# Patient Record
Sex: MALE | Race: WHITE | NOT HISPANIC OR LATINO | Employment: OTHER | URBAN - METROPOLITAN AREA
[De-identification: names, ages, dates, MRNs, and addresses within clinical notes are randomized per-mention and may not be internally consistent; named-entity substitution may affect disease eponyms.]

---

## 2019-01-01 RX ORDER — DILTIAZEM HYDROCHLORIDE 120 MG/1
120 CAPSULE, COATED, EXTENDED RELEASE ORAL 2 TIMES DAILY
Qty: 180 CAPSULE | Refills: 3 | Status: SHIPPED | OUTPATIENT
Start: 2019-01-01

## 2019-08-05 ENCOUNTER — OFFICE VISIT (OUTPATIENT)
Dept: CARDIOLOGY | Facility: CLINIC | Age: 84
End: 2019-08-05

## 2019-08-05 VITALS
HEIGHT: 67 IN | WEIGHT: 185 LBS | SYSTOLIC BLOOD PRESSURE: 177 MMHG | DIASTOLIC BLOOD PRESSURE: 69 MMHG | BODY MASS INDEX: 29.03 KG/M2 | HEART RATE: 60 BPM

## 2019-08-05 DIAGNOSIS — R00.2 PALPITATIONS: ICD-10-CM

## 2019-08-05 DIAGNOSIS — R06.02 SHORTNESS OF BREATH: ICD-10-CM

## 2019-08-05 DIAGNOSIS — I25.10 CHRONIC CORONARY ARTERY DISEASE: Primary | ICD-10-CM

## 2019-08-05 DIAGNOSIS — E78.5 HYPERLIPIDEMIA, UNSPECIFIED HYPERLIPIDEMIA TYPE: ICD-10-CM

## 2019-08-05 DIAGNOSIS — I10 ESSENTIAL HYPERTENSION: ICD-10-CM

## 2019-08-05 PROCEDURE — 99214 OFFICE O/P EST MOD 30 MIN: CPT | Performed by: INTERNAL MEDICINE

## 2019-08-05 RX ORDER — RAMIPRIL 10 MG/1
10 CAPSULE ORAL 2 TIMES DAILY
COMMUNITY
Start: 2019-08-01

## 2019-08-05 RX ORDER — ASPIRIN 81 MG/1
81 TABLET ORAL DAILY
COMMUNITY
Start: 2015-09-11

## 2019-08-05 RX ORDER — DILTIAZEM HYDROCHLORIDE 120 MG/1
120 CAPSULE, COATED, EXTENDED RELEASE ORAL 2 TIMES DAILY
Refills: 4 | COMMUNITY
Start: 2019-06-11 | End: 2019-01-01 | Stop reason: SDUPTHER

## 2019-08-05 RX ORDER — CLOPIDOGREL BISULFATE 75 MG/1
75 TABLET ORAL DAILY
Refills: 3 | COMMUNITY
Start: 2019-06-20

## 2019-08-05 RX ORDER — HYDROCODONE BITARTRATE AND ACETAMINOPHEN 10; 325 MG/1; MG/1
1 TABLET ORAL EVERY 6 HOURS PRN
Refills: 0 | COMMUNITY
Start: 2019-07-03

## 2019-08-05 RX ORDER — FLUTICASONE PROPIONATE 50 MCG
1 SPRAY, SUSPENSION (ML) NASAL DAILY
Refills: 1 | COMMUNITY
Start: 2019-07-19 | End: 2020-01-01

## 2019-08-05 RX ORDER — ATORVASTATIN CALCIUM 40 MG/1
40 TABLET, FILM COATED ORAL DAILY
Refills: 3 | COMMUNITY
Start: 2019-06-09

## 2019-08-05 RX ORDER — HYDRALAZINE HYDROCHLORIDE 50 MG/1
TABLET, FILM COATED ORAL EVERY 12 HOURS
COMMUNITY
Start: 2018-10-30 | End: 2019-08-05 | Stop reason: SDUPTHER

## 2019-08-05 RX ORDER — HYDRALAZINE HYDROCHLORIDE 100 MG/1
100 TABLET, FILM COATED ORAL EVERY 12 HOURS
Qty: 120 TABLET | Refills: 4 | Status: SHIPPED | OUTPATIENT
Start: 2019-08-05 | End: 2020-01-01

## 2019-08-05 RX ORDER — OMEPRAZOLE 40 MG/1
40 CAPSULE, DELAYED RELEASE ORAL DAILY
Refills: 3 | COMMUNITY
Start: 2019-07-22

## 2019-08-05 NOTE — PROGRESS NOTES
Date of Office Visit: 2019  Encounter Provider: Jose Lema MD  Place of Service: [facility] CARDIOLOGY [place]  Patient Name: Primitivo Contreras  :1935  Hawa Estevez NP    Chief Complaint   Patient presents with   • Coronary Artery Disease  Hypertension  Lipidemia     6 month f/u   • Hypertension     History of Present Illness    I am pleased to see Mr. Dash my office today as a follow-up.    As you know, patient is to 4 years old white gentleman whose past medical history significant for hypertension, hyperlipidemia, CAD, coronary artery stenting, who came today for follow-up.    In , patient had symptom of angina pectoris and underwent cardiac catheterization and he underwent coronary artery stenting.  Details of this procedure is not available to me.    In 2019, patient underwent stress test which showed no myocardial ischemia but small fixed apical defect was noted consistent with myocardial infarction.  Gated SPECT imaging showed LVEF of 49%.    Since the previous visit, patient is doing well.  Patient denies any symptom of angina pectoris or congestive heart failure.  Patient denies any chest pain or tightness or heaviness.  No orthopnea, PND, syncope or presyncope.  No leg edema noted.  No palpitation.    EKG showed sinus rhythm.  Patient had T wave inversion anteriorly.  This could be due to LVH or previous CAD.    At this stage, patient overall is stable from cardiovascular standpoint.  I would proceed with better control of blood pressure.  I would increase hydralazine 200 mg twice daily.  Patient is advised to monitor the blood pressure at home.  He brought his blood pressure logbook and most of blood pressure was between 140 to 160 mm systolic.  I would consider echocardiogram on next visit          Past Medical History:   Diagnosis Date   • Coronary artery disease    • Hyperlipidemia    • Hypertension          Past Surgical History:   Procedure Laterality Date    • CORONARY STENT PLACEMENT             Current Outpatient Medications:   •  aspirin (ADULT ASPIRIN EC LOW STRENGTH) 81 MG EC tablet, Daily., Disp: , Rfl:   •  atorvastatin (LIPITOR) 40 MG tablet, Take 40 mg by mouth Daily., Disp: , Rfl: 3  •  clopidogrel (PLAVIX) 75 MG tablet, Take 75 mg by mouth Daily., Disp: , Rfl: 3  •  diltiaZEM CD (CARDIZEM CD) 120 MG 24 hr capsule, Take 120 mg by mouth 2 (Two) Times a Day., Disp: , Rfl: 4  •  fluticasone (FLONASE) 50 MCG/ACT nasal spray, 1 spray by Each Nare route Daily., Disp: , Rfl: 1  •  hydrALAZINE (APRESOLINE) 50 MG tablet, Every 12 (Twelve) Hours., Disp: , Rfl:   •  HYDROcodone-acetaminophen (NORCO)  MG per tablet, As Needed., Disp: , Rfl: 0  •  omeprazole (priLOSEC) 40 MG capsule, Take 40 mg by mouth Daily., Disp: , Rfl: 3  •  ramipril (ALTACE) 10 MG capsule, Daily., Disp: , Rfl:       Social History     Socioeconomic History   • Marital status:      Spouse name: Not on file   • Number of children: Not on file   • Years of education: Not on file   • Highest education level: Not on file   Tobacco Use   • Smoking status: Former Smoker   • Tobacco comment: quit 30 yrs ago   Substance and Sexual Activity   • Alcohol use: Yes     Comment: social/ occasional   • Drug use: No   • Sexual activity: Defer         Review of Systems   Constitution: Negative for chills and fever.   HENT: Negative for ear discharge and nosebleeds.    Eyes: Negative for discharge and redness.   Cardiovascular: Negative for chest pain, orthopnea, palpitations, paroxysmal nocturnal dyspnea and syncope.   Respiratory: Negative for cough, shortness of breath and wheezing.    Endocrine: Negative for heat intolerance.   Skin: Negative for rash.   Musculoskeletal: Negative for arthritis and myalgias.   Gastrointestinal: Negative for abdominal pain, melena, nausea and vomiting.   Genitourinary: Negative for dysuria and hematuria.   Neurological: Negative for dizziness, light-headedness,  "numbness and tremors.   Psychiatric/Behavioral: Negative for depression. The patient is not nervous/anxious.        Procedures    Procedures    No orders to display           Objective:    /69   Pulse 60   Ht 170.2 cm (67\")   Wt 83.9 kg (185 lb)   BMI 28.98 kg/m²         Physical Exam   Constitutional: He is oriented to person, place, and time. He appears well-developed and well-nourished.   HENT:   Head: Normocephalic and atraumatic.   Eyes: Right eye exhibits no discharge. No scleral icterus.   Neck: No thyromegaly present.   Cardiovascular: Normal rate, regular rhythm and normal heart sounds. Exam reveals no gallop and no friction rub.   No murmur heard.  Pulmonary/Chest: Effort normal and breath sounds normal. No respiratory distress. He has no wheezes. He has no rales.   Abdominal: There is no tenderness.   Musculoskeletal: He exhibits no edema.   Lymphadenopathy:     He has no cervical adenopathy.   Neurological: He is alert and oriented to person, place, and time.   Skin: No rash noted. No erythema.   Psychiatric: He has a normal mood and affect.           Assessment:       Diagnosis Plan   1. Chronic coronary artery disease     2. Hyperlipidemia, unspecified hyperlipidemia type     3. Essential hypertension     4. Palpitations     5. Shortness of breath              Plan:       I am overall pleased with the patient progress.  His blood pressure is still elevated.  I would recommend to proceed with change in hydralazine.  I would change hydralazine 200 mg twice daily.  Blood pressure monitoring is recommended.  "

## 2019-10-03 ENCOUNTER — TELEPHONE (OUTPATIENT)
Dept: CARDIOLOGY | Facility: CLINIC | Age: 84
End: 2019-10-03

## 2019-10-03 NOTE — TELEPHONE ENCOUNTER
His ankles have been swollen for the last few weeks  They keep geting worse  Wanted to know what you wanted him to do

## 2019-10-04 RX ORDER — HYDROCHLOROTHIAZIDE 12.5 MG/1
12.5 CAPSULE, GELATIN COATED ORAL DAILY
Qty: 30 CAPSULE | Refills: 11 | Status: SHIPPED | OUTPATIENT
Start: 2019-10-04 | End: 2020-01-01

## 2019-10-04 NOTE — TELEPHONE ENCOUNTER
Per Dr Lema, he is to start taking hydrochlorithiazide 12.5mg.  I informed the patient that this RX to his pharmacy.

## 2020-01-01 ENCOUNTER — APPOINTMENT (OUTPATIENT)
Dept: GENERAL RADIOLOGY | Facility: HOSPITAL | Age: 85
End: 2020-01-01

## 2020-01-01 ENCOUNTER — OFFICE VISIT (OUTPATIENT)
Dept: CARDIOLOGY | Facility: CLINIC | Age: 85
End: 2020-01-01

## 2020-01-01 ENCOUNTER — APPOINTMENT (OUTPATIENT)
Dept: CARDIOLOGY | Facility: HOSPITAL | Age: 85
End: 2020-01-01

## 2020-01-01 ENCOUNTER — APPOINTMENT (OUTPATIENT)
Dept: NEUROLOGY | Facility: HOSPITAL | Age: 85
End: 2020-01-01

## 2020-01-01 ENCOUNTER — HOSPITAL ENCOUNTER (INPATIENT)
Facility: HOSPITAL | Age: 85
LOS: 4 days | End: 2020-10-11
Attending: EMERGENCY MEDICINE | Admitting: INTERNAL MEDICINE

## 2020-01-01 ENCOUNTER — APPOINTMENT (OUTPATIENT)
Dept: MRI IMAGING | Facility: HOSPITAL | Age: 85
End: 2020-01-01

## 2020-01-01 ENCOUNTER — APPOINTMENT (OUTPATIENT)
Dept: CT IMAGING | Facility: HOSPITAL | Age: 85
End: 2020-01-01

## 2020-01-01 VITALS
HEIGHT: 67 IN | SYSTOLIC BLOOD PRESSURE: 145 MMHG | BODY MASS INDEX: 28.25 KG/M2 | WEIGHT: 180 LBS | HEART RATE: 63 BPM | DIASTOLIC BLOOD PRESSURE: 60 MMHG | OXYGEN SATURATION: 96 %

## 2020-01-01 VITALS
HEIGHT: 67 IN | BODY MASS INDEX: 29.19 KG/M2 | DIASTOLIC BLOOD PRESSURE: 63 MMHG | SYSTOLIC BLOOD PRESSURE: 160 MMHG | WEIGHT: 186 LBS | HEART RATE: 63 BPM

## 2020-01-01 VITALS
SYSTOLIC BLOOD PRESSURE: 112 MMHG | TEMPERATURE: 99 F | OXYGEN SATURATION: 82 % | RESPIRATION RATE: 34 BRPM | DIASTOLIC BLOOD PRESSURE: 62 MMHG | WEIGHT: 187.39 LBS | HEART RATE: 102 BPM | BODY MASS INDEX: 25.38 KG/M2 | HEIGHT: 72 IN

## 2020-01-01 DIAGNOSIS — I10 ESSENTIAL HYPERTENSION: ICD-10-CM

## 2020-01-01 DIAGNOSIS — R00.2 PALPITATIONS: ICD-10-CM

## 2020-01-01 DIAGNOSIS — R06.02 SHORTNESS OF BREATH: ICD-10-CM

## 2020-01-01 DIAGNOSIS — I21.9 MYOCARDIAL INFARCTION, UNSPECIFIED MI TYPE, UNSPECIFIED ARTERY (HCC): ICD-10-CM

## 2020-01-01 DIAGNOSIS — E78.2 MIXED HYPERLIPIDEMIA: Primary | ICD-10-CM

## 2020-01-01 DIAGNOSIS — I25.10 CORONARY ARTERY DISEASE INVOLVING NATIVE CORONARY ARTERY OF NATIVE HEART WITHOUT ANGINA PECTORIS: ICD-10-CM

## 2020-01-01 DIAGNOSIS — E78.5 HYPERLIPIDEMIA, UNSPECIFIED HYPERLIPIDEMIA TYPE: ICD-10-CM

## 2020-01-01 DIAGNOSIS — I46.9 CARDIAC ARREST (HCC): Primary | ICD-10-CM

## 2020-01-01 DIAGNOSIS — I25.10 CHRONIC CORONARY ARTERY DISEASE: ICD-10-CM

## 2020-01-01 DIAGNOSIS — I49.01 VENTRICULAR FIBRILLATION (HCC): ICD-10-CM

## 2020-01-01 LAB
ALBUMIN SERPL-MCNC: 2.5 G/DL (ref 3.5–5.2)
ALBUMIN SERPL-MCNC: 3 G/DL (ref 3.5–5.2)
ALBUMIN SERPL-MCNC: 3 G/DL (ref 3.5–5.2)
ALBUMIN SERPL-MCNC: 3.3 G/DL (ref 3.5–5.2)
ALBUMIN SERPL-MCNC: 3.3 G/DL (ref 3.5–5.2)
ALBUMIN SERPL-MCNC: 3.4 G/DL (ref 3.5–5.2)
ALBUMIN SERPL-MCNC: 3.6 G/DL (ref 3.5–5.2)
ALBUMIN SERPL-MCNC: 3.8 G/DL (ref 3.5–5.2)
ALBUMIN/GLOB SERPL: 1 G/DL
ALBUMIN/GLOB SERPL: 1.3 G/DL
ALBUMIN/GLOB SERPL: 1.4 G/DL
ALBUMIN/GLOB SERPL: 1.5 G/DL
ALP SERPL-CCNC: 53 U/L (ref 39–117)
ALP SERPL-CCNC: 54 U/L (ref 39–117)
ALP SERPL-CCNC: 58 U/L (ref 39–117)
ALP SERPL-CCNC: 58 U/L (ref 39–117)
ALP SERPL-CCNC: 63 U/L (ref 39–117)
ALP SERPL-CCNC: 67 U/L (ref 39–117)
ALP SERPL-CCNC: 80 U/L (ref 39–117)
ALP SERPL-CCNC: 91 U/L (ref 39–117)
ALT SERPL W P-5'-P-CCNC: 41 U/L (ref 1–41)
ALT SERPL W P-5'-P-CCNC: 53 U/L (ref 1–41)
ALT SERPL W P-5'-P-CCNC: 59 U/L (ref 1–41)
ALT SERPL W P-5'-P-CCNC: 59 U/L (ref 1–41)
ALT SERPL W P-5'-P-CCNC: 62 U/L (ref 1–41)
ALT SERPL W P-5'-P-CCNC: 63 U/L (ref 1–41)
ALT SERPL W P-5'-P-CCNC: 64 U/L (ref 1–41)
ALT SERPL W P-5'-P-CCNC: 66 U/L (ref 1–41)
ANION GAP SERPL CALCULATED.3IONS-SCNC: 13 MMOL/L (ref 5–15)
ANION GAP SERPL CALCULATED.3IONS-SCNC: 15 MMOL/L (ref 5–15)
ANION GAP SERPL CALCULATED.3IONS-SCNC: 16 MMOL/L (ref 5–15)
ANION GAP SERPL CALCULATED.3IONS-SCNC: 19 MMOL/L (ref 5–15)
ANION GAP SERPL CALCULATED.3IONS-SCNC: 21 MMOL/L (ref 5–15)
ANION GAP SERPL CALCULATED.3IONS-SCNC: 23 MMOL/L (ref 5–15)
ANISOCYTOSIS BLD QL: ABNORMAL
APTT PPP: 21.9 SECONDS (ref 24–31)
APTT PPP: 24.3 SECONDS (ref 24–31)
APTT PPP: 26.6 SECONDS (ref 24–31)
APTT PPP: 27.2 SECONDS (ref 24–31)
APTT PPP: 28.1 SECONDS (ref 24–31)
APTT PPP: 28.2 SECONDS (ref 24–31)
APTT PPP: 28.4 SECONDS (ref 24–31)
APTT PPP: 37.1 SECONDS (ref 24–31)
ARTERIAL PATENCY WRIST A: ABNORMAL
ARTERIAL PATENCY WRIST A: POSITIVE
ARTERIAL PATENCY WRIST A: POSITIVE
AST SERPL-CCNC: 109 U/L (ref 1–40)
AST SERPL-CCNC: 110 U/L (ref 1–40)
AST SERPL-CCNC: 119 U/L (ref 1–40)
AST SERPL-CCNC: 124 U/L (ref 1–40)
AST SERPL-CCNC: 124 U/L (ref 1–40)
AST SERPL-CCNC: 125 U/L (ref 1–40)
AST SERPL-CCNC: 126 U/L (ref 1–40)
AST SERPL-CCNC: 138 U/L (ref 1–40)
ATMOSPHERIC PRESS: ABNORMAL MM[HG]
B PARAPERT DNA SPEC QL NAA+PROBE: NOT DETECTED
B PERT DNA SPEC QL NAA+PROBE: NOT DETECTED
BASE EXCESS BLDA CALC-SCNC: -1.2 MMOL/L (ref 0–3)
BASE EXCESS BLDA CALC-SCNC: -3.5 MMOL/L (ref 0–3)
BASE EXCESS BLDA CALC-SCNC: -4.8 MMOL/L (ref 0–3)
BASE EXCESS BLDA CALC-SCNC: -5 MMOL/L (ref 0–3)
BASE EXCESS BLDA CALC-SCNC: -6 MMOL/L (ref 0–3)
BASE EXCESS BLDA CALC-SCNC: -9.1 MMOL/L (ref 0–3)
BASOPHILS # BLD AUTO: 0 10*3/MM3 (ref 0–0.2)
BASOPHILS # BLD AUTO: 0.1 10*3/MM3 (ref 0–0.2)
BASOPHILS # BLD AUTO: 0.1 10*3/MM3 (ref 0–0.2)
BASOPHILS NFR BLD AUTO: 0.2 % (ref 0–1.5)
BASOPHILS NFR BLD AUTO: 0.8 % (ref 0–1.5)
BASOPHILS NFR BLD AUTO: 1 % (ref 0–1.5)
BDY SITE: ABNORMAL
BH CV ECHO MEAS - AO ROOT AREA (BSA CORRECTED): 1.5
BH CV ECHO MEAS - AO ROOT AREA: 7 CM^2
BH CV ECHO MEAS - AO ROOT DIAM: 3 CM
BH CV ECHO MEAS - ASC AORTA: 2.9 CM
BH CV ECHO MEAS - BSA(HAYCOCK): 2 M^2
BH CV ECHO MEAS - BSA: 2 M^2
BH CV ECHO MEAS - BZI_BMI: 24.3 KILOGRAMS/M^2
BH CV ECHO MEAS - BZI_METRIC_HEIGHT: 182.9 CM
BH CV ECHO MEAS - BZI_METRIC_WEIGHT: 81.2 KG
BH CV ECHO MEAS - EDV(CUBED): 134.6 ML
BH CV ECHO MEAS - EDV(TEICH): 125.2 ML
BH CV ECHO MEAS - EF(CUBED): 26.3 %
BH CV ECHO MEAS - EF(TEICH): 21.1 %
BH CV ECHO MEAS - ESV(CUBED): 99.1 ML
BH CV ECHO MEAS - ESV(TEICH): 98.7 ML
BH CV ECHO MEAS - FS: 9.7 %
BH CV ECHO MEAS - IVS/LVPW: 0.96
BH CV ECHO MEAS - IVSD: 1.1 CM
BH CV ECHO MEAS - LV MASS(C)D: 228.6 GRAMS
BH CV ECHO MEAS - LV MASS(C)DI: 112.5 GRAMS/M^2
BH CV ECHO MEAS - LVIDD: 5.1 CM
BH CV ECHO MEAS - LVIDS: 4.6 CM
BH CV ECHO MEAS - LVOT AREA: 3.7 CM^2
BH CV ECHO MEAS - LVOT DIAM: 2.2 CM
BH CV ECHO MEAS - LVPWD: 1.2 CM
BH CV ECHO MEAS - MV A MAX VEL: 105.6 CM/SEC
BH CV ECHO MEAS - MV DEC SLOPE: 235.5 CM/SEC^2
BH CV ECHO MEAS - MV DEC TIME: 0.28 SEC
BH CV ECHO MEAS - MV E MAX VEL: 66.9 CM/SEC
BH CV ECHO MEAS - MV E/A: 0.63
BH CV ECHO MEAS - MV MAX PG: 4.2 MMHG
BH CV ECHO MEAS - MV MEAN PG: 1.6 MMHG
BH CV ECHO MEAS - MV V2 MAX: 102.3 CM/SEC
BH CV ECHO MEAS - MV V2 MEAN: 57.3 CM/SEC
BH CV ECHO MEAS - MV V2 VTI: 30.8 CM
BH CV ECHO MEAS - PA MAX PG: 6.1 MMHG
BH CV ECHO MEAS - PA MEAN PG: 4.5 MMHG
BH CV ECHO MEAS - PA V2 MAX: 123.3 CM/SEC
BH CV ECHO MEAS - PA V2 MEAN: 104 CM/SEC
BH CV ECHO MEAS - PA V2 VTI: 26.1 CM
BH CV ECHO MEAS - RVDD: 2.7 CM
BH CV ECHO MEAS - SI(CUBED): 17.4 ML/M^2
BH CV ECHO MEAS - SI(TEICH): 13 ML/M^2
BH CV ECHO MEAS - SV(CUBED): 35.4 ML
BH CV ECHO MEAS - SV(TEICH): 26.4 ML
BILIRUB SERPL-MCNC: 0.4 MG/DL (ref 0–1.2)
BILIRUB SERPL-MCNC: 0.5 MG/DL (ref 0–1.2)
BILIRUB SERPL-MCNC: 0.5 MG/DL (ref 0–1.2)
BILIRUB SERPL-MCNC: 0.7 MG/DL (ref 0–1.2)
BILIRUB SERPL-MCNC: 0.8 MG/DL (ref 0–1.2)
BUN SERPL-MCNC: 18 MG/DL (ref 8–23)
BUN SERPL-MCNC: 21 MG/DL (ref 8–23)
BUN SERPL-MCNC: 23 MG/DL (ref 8–23)
BUN SERPL-MCNC: 25 MG/DL (ref 8–23)
BUN SERPL-MCNC: 27 MG/DL (ref 8–23)
BUN SERPL-MCNC: 29 MG/DL (ref 8–23)
BUN SERPL-MCNC: 31 MG/DL (ref 8–23)
BUN SERPL-MCNC: 32 MG/DL (ref 8–23)
BUN SERPL-MCNC: 47 MG/DL (ref 8–23)
BUN SERPL-MCNC: ABNORMAL MG/DL
BUN/CREAT SERPL: ABNORMAL
BURR CELLS BLD QL SMEAR: ABNORMAL
C PNEUM DNA NPH QL NAA+NON-PROBE: NOT DETECTED
CA-I BLDA-SCNC: 0.88 MMOL/L (ref 1.15–1.33)
CA-I SERPL ISE-MCNC: 1.04 MMOL/L (ref 1.2–1.3)
CA-I SERPL ISE-MCNC: 1.09 MMOL/L (ref 1.2–1.3)
CA-I SERPL ISE-MCNC: 1.11 MMOL/L (ref 1.2–1.3)
CA-I SERPL ISE-MCNC: 1.14 MMOL/L (ref 1.2–1.3)
CA-I SERPL ISE-MCNC: 1.14 MMOL/L (ref 1.2–1.3)
CA-I SERPL ISE-MCNC: 1.17 MMOL/L (ref 1.2–1.3)
CA-I SERPL ISE-MCNC: 1.17 MMOL/L (ref 1.2–1.3)
CALCIUM SPEC-SCNC: 6.9 MG/DL (ref 8.6–10.5)
CALCIUM SPEC-SCNC: 7.6 MG/DL (ref 8.6–10.5)
CALCIUM SPEC-SCNC: 7.8 MG/DL (ref 8.6–10.5)
CALCIUM SPEC-SCNC: 7.9 MG/DL (ref 8.6–10.5)
CALCIUM SPEC-SCNC: 8 MG/DL (ref 8.6–10.5)
CALCIUM SPEC-SCNC: 8.1 MG/DL (ref 8.6–10.5)
CALCIUM SPEC-SCNC: 8.4 MG/DL (ref 8.6–10.5)
CHLORIDE SERPL-SCNC: 101 MMOL/L (ref 98–107)
CHLORIDE SERPL-SCNC: 106 MMOL/L (ref 98–107)
CHLORIDE SERPL-SCNC: 107 MMOL/L (ref 98–107)
CHLORIDE SERPL-SCNC: 108 MMOL/L (ref 98–107)
CHLORIDE SERPL-SCNC: 113 MMOL/L (ref 98–107)
CK MB SERPL-CCNC: 11.18 NG/ML
CK SERPL-CCNC: 1220 U/L (ref 20–200)
CK SERPL-CCNC: 1266 U/L (ref 20–200)
CK SERPL-CCNC: 1515 U/L (ref 20–200)
CK SERPL-CCNC: 164 U/L (ref 20–200)
CK SERPL-CCNC: 362 U/L (ref 20–200)
CK SERPL-CCNC: 443 U/L (ref 20–200)
CK SERPL-CCNC: 649 U/L (ref 20–200)
CK SERPL-CCNC: 926 U/L (ref 20–200)
CO2 BLDA-SCNC: 16.3 MMOL/L (ref 22–29)
CO2 BLDA-SCNC: 18.5 MMOL/L (ref 22–29)
CO2 BLDA-SCNC: 19.6 MMOL/L (ref 22–29)
CO2 BLDA-SCNC: 23.1 MMOL/L (ref 22–29)
CO2 BLDA-SCNC: 23.5 MMOL/L (ref 22–29)
CO2 BLDA-SCNC: 24.3 MMOL/L (ref 22–29)
CO2 SERPL-SCNC: 13 MMOL/L (ref 22–29)
CO2 SERPL-SCNC: 14 MMOL/L (ref 22–29)
CO2 SERPL-SCNC: 15 MMOL/L (ref 22–29)
CO2 SERPL-SCNC: 16 MMOL/L (ref 22–29)
CO2 SERPL-SCNC: 17 MMOL/L (ref 22–29)
CO2 SERPL-SCNC: 20 MMOL/L (ref 22–29)
CO2 SERPL-SCNC: 20 MMOL/L (ref 22–29)
CO2 SERPL-SCNC: 21 MMOL/L (ref 22–29)
CO2 SERPL-SCNC: 23 MMOL/L (ref 22–29)
CREAT SERPL-MCNC: 1.5 MG/DL (ref 0.76–1.27)
CREAT SERPL-MCNC: 1.77 MG/DL (ref 0.76–1.27)
CREAT SERPL-MCNC: 1.98 MG/DL (ref 0.76–1.27)
CREAT SERPL-MCNC: 2.15 MG/DL (ref 0.76–1.27)
CREAT SERPL-MCNC: 2.31 MG/DL (ref 0.76–1.27)
CREAT SERPL-MCNC: 2.4 MG/DL (ref 0.76–1.27)
CREAT SERPL-MCNC: 2.53 MG/DL (ref 0.76–1.27)
CREAT SERPL-MCNC: 2.67 MG/DL (ref 0.76–1.27)
CREAT SERPL-MCNC: 3.46 MG/DL (ref 0.76–1.27)
D-LACTATE SERPL-SCNC: 1.6 MMOL/L (ref 0.5–2)
D-LACTATE SERPL-SCNC: 1.8 MMOL/L (ref 0.5–2)
D-LACTATE SERPL-SCNC: 2.1 MMOL/L (ref 0.5–2)
D-LACTATE SERPL-SCNC: 3.4 MMOL/L (ref 0.5–2)
D-LACTATE SERPL-SCNC: 5.5 MMOL/L (ref 0.5–2)
D-LACTATE SERPL-SCNC: 5.7 MMOL/L (ref 0.5–2)
D-LACTATE SERPL-SCNC: 7.8 MMOL/L (ref 0.5–2)
D-LACTATE SERPL-SCNC: 9.6 MMOL/L (ref 0.5–2)
DEPRECATED RDW RBC AUTO: 49.4 FL (ref 37–54)
DEPRECATED RDW RBC AUTO: 49.4 FL (ref 37–54)
DEPRECATED RDW RBC AUTO: 49.9 FL (ref 37–54)
DEPRECATED RDW RBC AUTO: 49.9 FL (ref 37–54)
DEPRECATED RDW RBC AUTO: 50.3 FL (ref 37–54)
DEPRECATED RDW RBC AUTO: 50.8 FL (ref 37–54)
EOSINOPHIL # BLD AUTO: 0 10*3/MM3 (ref 0–0.4)
EOSINOPHIL NFR BLD AUTO: 0 % (ref 0.3–6.2)
ERYTHROCYTE [DISTWIDTH] IN BLOOD BY AUTOMATED COUNT: 13.6 % (ref 12.3–15.4)
ERYTHROCYTE [DISTWIDTH] IN BLOOD BY AUTOMATED COUNT: 13.6 % (ref 12.3–15.4)
ERYTHROCYTE [DISTWIDTH] IN BLOOD BY AUTOMATED COUNT: 13.7 % (ref 12.3–15.4)
ERYTHROCYTE [DISTWIDTH] IN BLOOD BY AUTOMATED COUNT: 13.8 % (ref 12.3–15.4)
ERYTHROCYTE [DISTWIDTH] IN BLOOD BY AUTOMATED COUNT: 13.9 % (ref 12.3–15.4)
ERYTHROCYTE [DISTWIDTH] IN BLOOD BY AUTOMATED COUNT: 14.1 % (ref 12.3–15.4)
FLUAV H1 2009 PAND RNA NPH QL NAA+PROBE: NOT DETECTED
FLUAV H1 HA GENE NPH QL NAA+PROBE: NOT DETECTED
FLUAV H3 RNA NPH QL NAA+PROBE: NOT DETECTED
FLUAV SUBTYP SPEC NAA+PROBE: NOT DETECTED
FLUBV RNA ISLT QL NAA+PROBE: NOT DETECTED
GFR SERPL CREATININE-BSD FRML MDRD: 17 ML/MIN/1.73
GFR SERPL CREATININE-BSD FRML MDRD: 23 ML/MIN/1.73
GFR SERPL CREATININE-BSD FRML MDRD: 24 ML/MIN/1.73
GFR SERPL CREATININE-BSD FRML MDRD: 26 ML/MIN/1.73
GFR SERPL CREATININE-BSD FRML MDRD: 27 ML/MIN/1.73
GFR SERPL CREATININE-BSD FRML MDRD: 29 ML/MIN/1.73
GFR SERPL CREATININE-BSD FRML MDRD: 32 ML/MIN/1.73
GFR SERPL CREATININE-BSD FRML MDRD: 37 ML/MIN/1.73
GFR SERPL CREATININE-BSD FRML MDRD: 45 ML/MIN/1.73
GLOBULIN UR ELPH-MCNC: 2 GM/DL
GLOBULIN UR ELPH-MCNC: 2.3 GM/DL
GLOBULIN UR ELPH-MCNC: 2.4 GM/DL
GLOBULIN UR ELPH-MCNC: 2.4 GM/DL
GLOBULIN UR ELPH-MCNC: 2.5 GM/DL
GLOBULIN UR ELPH-MCNC: 2.8 GM/DL
GLUCOSE BLDC GLUCOMTR-MCNC: 100 MG/DL (ref 70–105)
GLUCOSE BLDC GLUCOMTR-MCNC: 103 MG/DL (ref 70–105)
GLUCOSE BLDC GLUCOMTR-MCNC: 104 MG/DL (ref 70–105)
GLUCOSE BLDC GLUCOMTR-MCNC: 109 MG/DL (ref 70–105)
GLUCOSE BLDC GLUCOMTR-MCNC: 129 MG/DL (ref 70–105)
GLUCOSE BLDC GLUCOMTR-MCNC: 131 MG/DL (ref 70–105)
GLUCOSE BLDC GLUCOMTR-MCNC: 151 MG/DL (ref 70–105)
GLUCOSE BLDC GLUCOMTR-MCNC: 159 MG/DL (ref 74–100)
GLUCOSE BLDC GLUCOMTR-MCNC: 159 MG/DL (ref 74–100)
GLUCOSE BLDC GLUCOMTR-MCNC: 174 MG/DL (ref 70–105)
GLUCOSE BLDC GLUCOMTR-MCNC: 189 MG/DL (ref 70–105)
GLUCOSE BLDC GLUCOMTR-MCNC: 226 MG/DL (ref 70–105)
GLUCOSE BLDC GLUCOMTR-MCNC: 228 MG/DL (ref 70–105)
GLUCOSE BLDC GLUCOMTR-MCNC: 236 MG/DL (ref 70–105)
GLUCOSE BLDC GLUCOMTR-MCNC: 74 MG/DL (ref 70–105)
GLUCOSE BLDC GLUCOMTR-MCNC: 75 MG/DL (ref 70–105)
GLUCOSE BLDC GLUCOMTR-MCNC: 80 MG/DL (ref 70–105)
GLUCOSE BLDC GLUCOMTR-MCNC: 80 MG/DL (ref 70–105)
GLUCOSE BLDC GLUCOMTR-MCNC: 85 MG/DL (ref 70–105)
GLUCOSE BLDC GLUCOMTR-MCNC: 86 MG/DL (ref 70–105)
GLUCOSE BLDC GLUCOMTR-MCNC: 86 MG/DL (ref 70–105)
GLUCOSE BLDC GLUCOMTR-MCNC: 87 MG/DL (ref 70–105)
GLUCOSE BLDC GLUCOMTR-MCNC: 88 MG/DL (ref 70–105)
GLUCOSE BLDC GLUCOMTR-MCNC: 90 MG/DL (ref 70–105)
GLUCOSE BLDC GLUCOMTR-MCNC: 91 MG/DL (ref 70–105)
GLUCOSE BLDC GLUCOMTR-MCNC: 96 MG/DL (ref 70–105)
GLUCOSE BLDC GLUCOMTR-MCNC: 97 MG/DL (ref 70–105)
GLUCOSE BLDC GLUCOMTR-MCNC: 97 MG/DL (ref 70–105)
GLUCOSE SERPL-MCNC: 103 MG/DL (ref 65–99)
GLUCOSE SERPL-MCNC: 106 MG/DL (ref 65–99)
GLUCOSE SERPL-MCNC: 120 MG/DL (ref 65–99)
GLUCOSE SERPL-MCNC: 169 MG/DL (ref 65–99)
GLUCOSE SERPL-MCNC: 173 MG/DL (ref 65–99)
GLUCOSE SERPL-MCNC: 178 MG/DL (ref 65–99)
GLUCOSE SERPL-MCNC: 187 MG/DL (ref 65–99)
GLUCOSE SERPL-MCNC: 280 MG/DL (ref 65–99)
GLUCOSE SERPL-MCNC: 91 MG/DL (ref 65–99)
HADV DNA SPEC NAA+PROBE: NOT DETECTED
HCO3 BLDA-SCNC: 15.4 MMOL/L (ref 21–28)
HCO3 BLDA-SCNC: 17.7 MMOL/L (ref 21–28)
HCO3 BLDA-SCNC: 18.6 MMOL/L (ref 21–28)
HCO3 BLDA-SCNC: 21.7 MMOL/L (ref 21–28)
HCO3 BLDA-SCNC: 22.3 MMOL/L (ref 21–28)
HCO3 BLDA-SCNC: 23.2 MMOL/L (ref 21–28)
HCOV 229E RNA SPEC QL NAA+PROBE: NOT DETECTED
HCOV HKU1 RNA SPEC QL NAA+PROBE: NOT DETECTED
HCOV NL63 RNA SPEC QL NAA+PROBE: NOT DETECTED
HCOV OC43 RNA SPEC QL NAA+PROBE: NOT DETECTED
HCT VFR BLD AUTO: 28.1 % (ref 37.5–51)
HCT VFR BLD AUTO: 30.4 % (ref 37.5–51)
HCT VFR BLD AUTO: 31.9 % (ref 37.5–51)
HCT VFR BLD AUTO: 32.6 % (ref 37.5–51)
HCT VFR BLD AUTO: 32.6 % (ref 37.5–51)
HCT VFR BLD AUTO: 32.7 % (ref 37.5–51)
HCT VFR BLD AUTO: 33.4 % (ref 37.5–51)
HCT VFR BLD AUTO: 33.7 % (ref 37.5–51)
HCT VFR BLDA CALC: 32 % (ref 38–51)
HEMODILUTION: NO
HGB BLD-MCNC: 10.2 G/DL (ref 13–17.7)
HGB BLD-MCNC: 10.9 G/DL (ref 13–17.7)
HGB BLD-MCNC: 11.2 G/DL (ref 13–17.7)
HGB BLD-MCNC: 9.6 G/DL (ref 13–17.7)
HGB BLDA-MCNC: 10.8 G/DL (ref 12–17)
HMPV RNA NPH QL NAA+NON-PROBE: NOT DETECTED
HOLD SPECIMEN: NORMAL
HPIV1 RNA SPEC QL NAA+PROBE: NOT DETECTED
HPIV2 RNA SPEC QL NAA+PROBE: NOT DETECTED
HPIV3 RNA NPH QL NAA+PROBE: NOT DETECTED
HPIV4 P GENE NPH QL NAA+PROBE: NOT DETECTED
INHALED O2 CONCENTRATION: 100 %
INHALED O2 CONCENTRATION: 40 %
INHALED O2 CONCENTRATION: 40 %
INHALED O2 CONCENTRATION: 50 %
INHALED O2 CONCENTRATION: 60 %
INHALED O2 CONCENTRATION: 60 %
INR PPP: 1.13 (ref 0.93–1.1)
INR PPP: 1.14 (ref 0.93–1.1)
LACTATE HOLD SPECIMEN: NORMAL
LARGE PLATELETS: ABNORMAL
LYMPHOCYTES # BLD AUTO: 0.5 10*3/MM3 (ref 0.7–3.1)
LYMPHOCYTES # BLD AUTO: 0.7 10*3/MM3 (ref 0.7–3.1)
LYMPHOCYTES # BLD AUTO: 1.1 10*3/MM3 (ref 0.7–3.1)
LYMPHOCYTES # BLD MANUAL: 0.66 10*3/MM3 (ref 0.7–3.1)
LYMPHOCYTES # BLD MANUAL: 0.9 10*3/MM3 (ref 0.7–3.1)
LYMPHOCYTES # BLD MANUAL: 0.9 10*3/MM3 (ref 0.7–3.1)
LYMPHOCYTES # BLD MANUAL: 0.92 10*3/MM3 (ref 0.7–3.1)
LYMPHOCYTES # BLD MANUAL: 2.03 10*3/MM3 (ref 0.7–3.1)
LYMPHOCYTES NFR BLD AUTO: 5.6 % (ref 19.6–45.3)
LYMPHOCYTES NFR BLD AUTO: 7 % (ref 19.6–45.3)
LYMPHOCYTES NFR BLD AUTO: 7.3 % (ref 19.6–45.3)
LYMPHOCYTES NFR BLD MANUAL: 1 % (ref 5–12)
LYMPHOCYTES NFR BLD MANUAL: 10 % (ref 19.6–45.3)
LYMPHOCYTES NFR BLD MANUAL: 3 % (ref 5–12)
LYMPHOCYTES NFR BLD MANUAL: 4 % (ref 19.6–45.3)
LYMPHOCYTES NFR BLD MANUAL: 4 % (ref 5–12)
LYMPHOCYTES NFR BLD MANUAL: 6 % (ref 19.6–45.3)
LYMPHOCYTES NFR BLD MANUAL: 6 % (ref 5–12)
LYMPHOCYTES NFR BLD MANUAL: 7 % (ref 19.6–45.3)
LYMPHOCYTES NFR BLD MANUAL: 9 % (ref 19.6–45.3)
LYMPHOCYTES NFR BLD MANUAL: 9 % (ref 5–12)
M PNEUMO IGG SER IA-ACNC: NOT DETECTED
MACROCYTES BLD QL SMEAR: ABNORMAL
MAGNESIUM SERPL-MCNC: 1.7 MG/DL (ref 1.6–2.4)
MAGNESIUM SERPL-MCNC: 2 MG/DL (ref 1.6–2.4)
MAGNESIUM SERPL-MCNC: 2.3 MG/DL (ref 1.6–2.4)
MAGNESIUM SERPL-MCNC: 2.6 MG/DL (ref 1.6–2.4)
MAGNESIUM SERPL-MCNC: 2.9 MG/DL (ref 1.6–2.4)
MAGNESIUM SERPL-MCNC: 2.9 MG/DL (ref 1.6–2.4)
MAGNESIUM SERPL-MCNC: 3.1 MG/DL (ref 1.6–2.4)
MCH RBC QN AUTO: 34.8 PG (ref 26.6–33)
MCH RBC QN AUTO: 34.9 PG (ref 26.6–33)
MCH RBC QN AUTO: 35.2 PG (ref 26.6–33)
MCH RBC QN AUTO: 35.2 PG (ref 26.6–33)
MCH RBC QN AUTO: 35.3 PG (ref 26.6–33)
MCH RBC QN AUTO: 35.7 PG (ref 26.6–33)
MCHC RBC AUTO-ENTMCNC: 33.2 G/DL (ref 31.5–35.7)
MCHC RBC AUTO-ENTMCNC: 33.4 G/DL (ref 31.5–35.7)
MCHC RBC AUTO-ENTMCNC: 33.4 G/DL (ref 31.5–35.7)
MCHC RBC AUTO-ENTMCNC: 33.6 G/DL (ref 31.5–35.7)
MCHC RBC AUTO-ENTMCNC: 33.6 G/DL (ref 31.5–35.7)
MCHC RBC AUTO-ENTMCNC: 34 G/DL (ref 31.5–35.7)
MCHC RBC AUTO-ENTMCNC: 34.3 G/DL (ref 31.5–35.7)
MCHC RBC AUTO-ENTMCNC: 34.3 G/DL (ref 31.5–35.7)
MCV RBC AUTO: 102.5 FL (ref 79–97)
MCV RBC AUTO: 103.5 FL (ref 79–97)
MCV RBC AUTO: 103.6 FL (ref 79–97)
MCV RBC AUTO: 103.6 FL (ref 79–97)
MCV RBC AUTO: 104 FL (ref 79–97)
MCV RBC AUTO: 104.5 FL (ref 79–97)
MCV RBC AUTO: 104.8 FL (ref 79–97)
MCV RBC AUTO: 105.7 FL (ref 79–97)
METAMYELOCYTES NFR BLD MANUAL: 1 % (ref 0–0)
METAMYELOCYTES NFR BLD MANUAL: 4 % (ref 0–0)
MODALITY: ABNORMAL
MONOCYTES # BLD AUTO: 0.2 10*3/MM3 (ref 0.1–0.9)
MONOCYTES # BLD AUTO: 0.29 10*3/MM3 (ref 0.1–0.9)
MONOCYTES # BLD AUTO: 0.4 10*3/MM3 (ref 0.1–0.9)
MONOCYTES # BLD AUTO: 0.4 10*3/MM3 (ref 0.1–0.9)
MONOCYTES # BLD AUTO: 0.69 10*3/MM3 (ref 0.1–0.9)
MONOCYTES # BLD AUTO: 0.77 10*3/MM3 (ref 0.1–0.9)
MONOCYTES # BLD AUTO: 1.35 10*3/MM3 (ref 0.1–0.9)
MONOCYTES # BLD AUTO: 1.5 10*3/MM3 (ref 0.1–0.9)
MONOCYTES NFR BLD AUTO: 4.4 % (ref 5–12)
MONOCYTES NFR BLD AUTO: 4.9 % (ref 5–12)
MONOCYTES NFR BLD AUTO: 9.9 % (ref 5–12)
MRSA DNA SPEC QL NAA+PROBE: NORMAL
MYELOCYTES NFR BLD MANUAL: 1 % (ref 0–0)
NEUTROPHILS # BLD AUTO: 10.71 10*3/MM3 (ref 1.7–7)
NEUTROPHILS # BLD AUTO: 12.6 10*3/MM3 (ref 1.7–7)
NEUTROPHILS # BLD AUTO: 18.07 10*3/MM3 (ref 1.7–7)
NEUTROPHILS # BLD AUTO: 21.16 10*3/MM3 (ref 1.7–7)
NEUTROPHILS # BLD AUTO: 6.35 10*3/MM3 (ref 1.7–7)
NEUTROPHILS NFR BLD AUTO: 12.9 10*3/MM3 (ref 1.7–7)
NEUTROPHILS NFR BLD AUTO: 7.3 10*3/MM3 (ref 1.7–7)
NEUTROPHILS NFR BLD AUTO: 8.9 10*3/MM3 (ref 1.7–7)
NEUTROPHILS NFR BLD AUTO: 82.6 % (ref 42.7–76)
NEUTROPHILS NFR BLD AUTO: 87.6 % (ref 42.7–76)
NEUTROPHILS NFR BLD AUTO: 88.7 % (ref 42.7–76)
NEUTROPHILS NFR BLD MANUAL: 12 % (ref 42.7–76)
NEUTROPHILS NFR BLD MANUAL: 44 % (ref 42.7–76)
NEUTROPHILS NFR BLD MANUAL: 46 % (ref 42.7–76)
NEUTROPHILS NFR BLD MANUAL: 64 % (ref 42.7–76)
NEUTROPHILS NFR BLD MANUAL: 70 % (ref 42.7–76)
NEUTS BAND NFR BLD MANUAL: 22 % (ref 0–5)
NEUTS BAND NFR BLD MANUAL: 25 % (ref 0–5)
NEUTS BAND NFR BLD MANUAL: 37 % (ref 0–5)
NEUTS BAND NFR BLD MANUAL: 43 % (ref 0–5)
NEUTS BAND NFR BLD MANUAL: 72 % (ref 0–5)
NRBC BLD AUTO-RTO: 0 /100 WBC (ref 0–0.2)
NRBC BLD AUTO-RTO: 0.1 /100 WBC (ref 0–0.2)
NRBC BLD AUTO-RTO: 0.1 /100 WBC (ref 0–0.2)
PCO2 BLDA: 25.3 MM HG (ref 35–48)
PCO2 BLDA: 28.2 MM HG (ref 35–48)
PCO2 BLDA: 32.7 MM HG (ref 35–48)
PCO2 BLDA: 36.1 MM HG (ref 35–48)
PCO2 BLDA: 41.9 MM HG (ref 35–48)
PCO2 BLDA: 45.5 MM HG (ref 35–48)
PEEP RESPIRATORY: 5 CM[H2O]
PH BLDA: 7.29 PH UNITS (ref 7.35–7.45)
PH BLDA: 7.33 PH UNITS (ref 7.35–7.45)
PH BLDA: 7.34 PH UNITS (ref 7.35–7.45)
PH BLDA: 7.36 PH UNITS (ref 7.35–7.45)
PH BLDA: 7.42 PH UNITS (ref 7.35–7.45)
PH BLDA: 7.45 PH UNITS (ref 7.35–7.45)
PHOSPHATE SERPL-MCNC: 2.2 MG/DL (ref 2.5–4.5)
PHOSPHATE SERPL-MCNC: 2.6 MG/DL (ref 2.5–4.5)
PHOSPHATE SERPL-MCNC: 2.8 MG/DL (ref 2.5–4.5)
PHOSPHATE SERPL-MCNC: 3 MG/DL (ref 2.5–4.5)
PHOSPHATE SERPL-MCNC: 3.7 MG/DL (ref 2.5–4.5)
PHOSPHATE SERPL-MCNC: 4.5 MG/DL (ref 2.5–4.5)
PHOSPHATE SERPL-MCNC: 5.1 MG/DL (ref 2.5–4.5)
PLAT MORPH BLD: NORMAL
PLAT MORPH BLD: NORMAL
PLATELET # BLD AUTO: 142 10*3/MM3 (ref 140–450)
PLATELET # BLD AUTO: 205 10*3/MM3 (ref 140–450)
PLATELET # BLD AUTO: 212 10*3/MM3 (ref 140–450)
PLATELET # BLD AUTO: 216 10*3/MM3 (ref 140–450)
PLATELET # BLD AUTO: 239 10*3/MM3 (ref 140–450)
PLATELET # BLD AUTO: 292 10*3/MM3 (ref 140–450)
PLATELET # BLD AUTO: 311 10*3/MM3 (ref 140–450)
PLATELET # BLD AUTO: 336 10*3/MM3 (ref 140–450)
PMV BLD AUTO: 7.9 FL (ref 6–12)
PMV BLD AUTO: 8 FL (ref 6–12)
PMV BLD AUTO: 8 FL (ref 6–12)
PMV BLD AUTO: 8.1 FL (ref 6–12)
PMV BLD AUTO: 8.2 FL (ref 6–12)
PMV BLD AUTO: 8.2 FL (ref 6–12)
PMV BLD AUTO: 8.3 FL (ref 6–12)
PMV BLD AUTO: 8.9 FL (ref 6–12)
PO2 BLDA: 147.8 MM HG (ref 83–108)
PO2 BLDA: 173.6 MM HG (ref 83–108)
PO2 BLDA: 372.3 MM HG (ref 83–108)
PO2 BLDA: 478.4 MM HG (ref 83–108)
PO2 BLDA: 58.4 MM HG (ref 83–108)
PO2 BLDA: 97.6 MM HG (ref 83–108)
POIKILOCYTOSIS BLD QL SMEAR: ABNORMAL
POTASSIUM BLDA-SCNC: 3.8 MMOL/L (ref 3.5–4.5)
POTASSIUM SERPL-SCNC: 2.8 MMOL/L (ref 3.5–5.2)
POTASSIUM SERPL-SCNC: 3.4 MMOL/L (ref 3.5–5.2)
POTASSIUM SERPL-SCNC: 3.5 MMOL/L (ref 3.5–5.2)
POTASSIUM SERPL-SCNC: 3.5 MMOL/L (ref 3.5–5.2)
POTASSIUM SERPL-SCNC: 3.7 MMOL/L (ref 3.5–5.2)
POTASSIUM SERPL-SCNC: 3.9 MMOL/L (ref 3.5–5.2)
POTASSIUM SERPL-SCNC: 4.2 MMOL/L (ref 3.5–5.2)
POTASSIUM SERPL-SCNC: 4.3 MMOL/L (ref 3.5–5.2)
POTASSIUM SERPL-SCNC: 4.3 MMOL/L (ref 3.5–5.2)
PROCALCITONIN SERPL-MCNC: 0.05 NG/ML (ref 0–0.25)
PROT SERPL-MCNC: 5 G/DL (ref 6–8.5)
PROT SERPL-MCNC: 5 G/DL (ref 6–8.5)
PROT SERPL-MCNC: 5.3 G/DL (ref 6–8.5)
PROT SERPL-MCNC: 5.6 G/DL (ref 6–8.5)
PROT SERPL-MCNC: 5.7 G/DL (ref 6–8.5)
PROT SERPL-MCNC: 5.7 G/DL (ref 6–8.5)
PROT SERPL-MCNC: 6 G/DL (ref 6–8.5)
PROT SERPL-MCNC: 6.6 G/DL (ref 6–8.5)
PROTHROMBIN TIME: 12.4 SECONDS (ref 9.6–11.7)
PROTHROMBIN TIME: 12.5 SECONDS (ref 9.6–11.7)
RBC # BLD AUTO: 2.72 10*6/MM3 (ref 4.14–5.8)
RBC # BLD AUTO: 2.91 10*6/MM3 (ref 4.14–5.8)
RBC # BLD AUTO: 3.08 10*6/MM3 (ref 4.14–5.8)
RBC # BLD AUTO: 3.11 10*6/MM3 (ref 4.14–5.8)
RBC # BLD AUTO: 3.14 10*6/MM3 (ref 4.14–5.8)
RBC # BLD AUTO: 3.15 10*6/MM3 (ref 4.14–5.8)
RBC # BLD AUTO: 3.21 10*6/MM3 (ref 4.14–5.8)
RBC # BLD AUTO: 3.23 10*6/MM3 (ref 4.14–5.8)
RBC MORPH BLD: NORMAL
RESPIRATORY RATE: 18
RHINOVIRUS RNA SPEC NAA+PROBE: NOT DETECTED
RSV RNA NPH QL NAA+NON-PROBE: NOT DETECTED
SAO2 % BLDCOA: 100 % (ref 94–98)
SAO2 % BLDCOA: 100 % (ref 94–98)
SAO2 % BLDCOA: 86.4 % (ref 94–98)
SAO2 % BLDCOA: 97.1 % (ref 94–98)
SAO2 % BLDCOA: 99.3 % (ref 94–98)
SAO2 % BLDCOA: 99.5 % (ref 94–98)
SARS-COV-2 RNA NPH QL NAA+NON-PROBE: NOT DETECTED
SCAN SLIDE: NORMAL
SODIUM BLD-SCNC: 144 MMOL/L (ref 138–146)
SODIUM SERPL-SCNC: 139 MMOL/L (ref 136–145)
SODIUM SERPL-SCNC: 139 MMOL/L (ref 136–145)
SODIUM SERPL-SCNC: 141 MMOL/L (ref 136–145)
SODIUM SERPL-SCNC: 142 MMOL/L (ref 136–145)
SODIUM SERPL-SCNC: 143 MMOL/L (ref 136–145)
SODIUM SERPL-SCNC: 144 MMOL/L (ref 136–145)
TOXIC GRANULATION: ABNORMAL
TRIGL SERPL-MCNC: 117 MG/DL (ref 0–150)
TROPONIN T SERPL-MCNC: 0.72 NG/ML (ref 0–0.03)
TROPONIN T SERPL-MCNC: 1.07 NG/ML (ref 0–0.03)
TROPONIN T SERPL-MCNC: 1.31 NG/ML (ref 0–0.03)
TROPONIN T SERPL-MCNC: 1.96 NG/ML (ref 0–0.03)
TROPONIN T SERPL-MCNC: 2.01 NG/ML (ref 0–0.03)
VANCOMYCIN SERPL-MCNC: 14.8 MCG/ML (ref 5–40)
VENTILATOR MODE: ABNORMAL
VT ON VENT VENT: 480 ML
VT ON VENT VENT: 700 ML
VT ON VENT VENT: 700 ML
WBC # BLD AUTO: 10.2 10*3/MM3 (ref 3.4–10.8)
WBC # BLD AUTO: 12.9 10*3/MM3 (ref 3.4–10.8)
WBC # BLD AUTO: 15 10*3/MM3 (ref 3.4–10.8)
WBC # BLD AUTO: 15.6 10*3/MM3 (ref 3.4–10.8)
WBC # BLD AUTO: 20.3 10*3/MM3 (ref 3.4–10.8)
WBC # BLD AUTO: 23 10*3/MM3 (ref 3.4–10.8)
WBC # BLD AUTO: 7.3 10*3/MM3 (ref 3.4–10.8)
WBC # BLD AUTO: 8.2 10*3/MM3 (ref 3.4–10.8)
WBC MORPH BLD: NORMAL

## 2020-01-01 PROCEDURE — 94799 UNLISTED PULMONARY SVC/PX: CPT

## 2020-01-01 PROCEDURE — 25010000002 PROPOFOL 1000 MG/ML EMULSION: Performed by: NURSE PRACTITIONER

## 2020-01-01 PROCEDURE — 82803 BLOOD GASES ANY COMBINATION: CPT

## 2020-01-01 PROCEDURE — 93005 ELECTROCARDIOGRAM TRACING: CPT | Performed by: EMERGENCY MEDICINE

## 2020-01-01 PROCEDURE — 80202 ASSAY OF VANCOMYCIN: CPT | Performed by: INTERNAL MEDICINE

## 2020-01-01 PROCEDURE — 80051 ELECTROLYTE PANEL: CPT

## 2020-01-01 PROCEDURE — 85730 THROMBOPLASTIN TIME PARTIAL: CPT | Performed by: NURSE PRACTITIONER

## 2020-01-01 PROCEDURE — 25010000002 FOSPHENYTOIN 100 MG PE/2ML SOLUTION 2 ML VIAL: Performed by: NURSE PRACTITIONER

## 2020-01-01 PROCEDURE — 02HV33Z INSERTION OF INFUSION DEVICE INTO SUPERIOR VENA CAVA, PERCUTANEOUS APPROACH: ICD-10-PCS | Performed by: INTERNAL MEDICINE

## 2020-01-01 PROCEDURE — 83735 ASSAY OF MAGNESIUM: CPT | Performed by: NURSE PRACTITIONER

## 2020-01-01 PROCEDURE — 25010000002 CEFEPIME PER 500 MG: Performed by: INTERNAL MEDICINE

## 2020-01-01 PROCEDURE — 87641 MR-STAPH DNA AMP PROBE: CPT | Performed by: NURSE PRACTITIONER

## 2020-01-01 PROCEDURE — 83605 ASSAY OF LACTIC ACID: CPT | Performed by: NURSE PRACTITIONER

## 2020-01-01 PROCEDURE — 84520 ASSAY OF UREA NITROGEN: CPT | Performed by: NURSE PRACTITIONER

## 2020-01-01 PROCEDURE — 25010000002 CEFEPIME PER 500 MG: Performed by: NURSE PRACTITIONER

## 2020-01-01 PROCEDURE — 70551 MRI BRAIN STEM W/O DYE: CPT

## 2020-01-01 PROCEDURE — 0202U NFCT DS 22 TRGT SARS-COV-2: CPT | Performed by: NURSE PRACTITIONER

## 2020-01-01 PROCEDURE — 36600 WITHDRAWAL OF ARTERIAL BLOOD: CPT

## 2020-01-01 PROCEDURE — 25010000002 LORAZEPAM PER 2 MG: Performed by: INTERNAL MEDICINE

## 2020-01-01 PROCEDURE — 82550 ASSAY OF CK (CPK): CPT | Performed by: NURSE PRACTITIONER

## 2020-01-01 PROCEDURE — 85007 BL SMEAR W/DIFF WBC COUNT: CPT | Performed by: INTERNAL MEDICINE

## 2020-01-01 PROCEDURE — 25010000003 POTASSIUM CHLORIDE PER 2 MEQ: Performed by: NURSE PRACTITIONER

## 2020-01-01 PROCEDURE — 82553 CREATINE MB FRACTION: CPT | Performed by: NURSE PRACTITIONER

## 2020-01-01 PROCEDURE — 84100 ASSAY OF PHOSPHORUS: CPT | Performed by: NURSE PRACTITIONER

## 2020-01-01 PROCEDURE — 82330 ASSAY OF CALCIUM: CPT | Performed by: NURSE PRACTITIONER

## 2020-01-01 PROCEDURE — 25010000002 MORPHINE PER 10 MG: Performed by: INTERNAL MEDICINE

## 2020-01-01 PROCEDURE — 25010000002 MIDAZOLAM PER 1 MG: Performed by: NURSE PRACTITIONER

## 2020-01-01 PROCEDURE — 87040 BLOOD CULTURE FOR BACTERIA: CPT | Performed by: NURSE PRACTITIONER

## 2020-01-01 PROCEDURE — 85610 PROTHROMBIN TIME: CPT | Performed by: NURSE PRACTITIONER

## 2020-01-01 PROCEDURE — 85610 PROTHROMBIN TIME: CPT | Performed by: INTERNAL MEDICINE

## 2020-01-01 PROCEDURE — 25010000002 HEPARIN (PORCINE) PER 1000 UNITS: Performed by: INTERNAL MEDICINE

## 2020-01-01 PROCEDURE — 85025 COMPLETE CBC W/AUTO DIFF WBC: CPT | Performed by: NURSE PRACTITIONER

## 2020-01-01 PROCEDURE — 99231 SBSQ HOSP IP/OBS SF/LOW 25: CPT | Performed by: PSYCHIATRY & NEUROLOGY

## 2020-01-01 PROCEDURE — 84478 ASSAY OF TRIGLYCERIDES: CPT | Performed by: INTERNAL MEDICINE

## 2020-01-01 PROCEDURE — 80053 COMPREHEN METABOLIC PANEL: CPT | Performed by: INTERNAL MEDICINE

## 2020-01-01 PROCEDURE — 99232 SBSQ HOSP IP/OBS MODERATE 35: CPT | Performed by: INTERNAL MEDICINE

## 2020-01-01 PROCEDURE — 25010000002 DOPAMINE PER 40 MG: Performed by: INTERNAL MEDICINE

## 2020-01-01 PROCEDURE — 84145 PROCALCITONIN (PCT): CPT | Performed by: NURSE PRACTITIONER

## 2020-01-01 PROCEDURE — 82330 ASSAY OF CALCIUM: CPT

## 2020-01-01 PROCEDURE — 94002 VENT MGMT INPAT INIT DAY: CPT

## 2020-01-01 PROCEDURE — 82962 GLUCOSE BLOOD TEST: CPT

## 2020-01-01 PROCEDURE — 99223 1ST HOSP IP/OBS HIGH 75: CPT | Performed by: INTERNAL MEDICINE

## 2020-01-01 PROCEDURE — 80053 COMPREHEN METABOLIC PANEL: CPT | Performed by: NURSE PRACTITIONER

## 2020-01-01 PROCEDURE — 94003 VENT MGMT INPAT SUBQ DAY: CPT

## 2020-01-01 PROCEDURE — 71045 X-RAY EXAM CHEST 1 VIEW: CPT

## 2020-01-01 PROCEDURE — 99221 1ST HOSP IP/OBS SF/LOW 40: CPT | Performed by: PSYCHIATRY & NEUROLOGY

## 2020-01-01 PROCEDURE — 25010000002 CALCIUM GLUCONATE-NACL 1-0.675 GM/50ML-% SOLUTION: Performed by: INTERNAL MEDICINE

## 2020-01-01 PROCEDURE — 99221 1ST HOSP IP/OBS SF/LOW 40: CPT | Performed by: HOSPITALIST

## 2020-01-01 PROCEDURE — 25010000002 FENTANYL CITRATE (PF) 2500 MCG/50ML SOLUTION: Performed by: NURSE PRACTITIONER

## 2020-01-01 PROCEDURE — 99214 OFFICE O/P EST MOD 30 MIN: CPT | Performed by: INTERNAL MEDICINE

## 2020-01-01 PROCEDURE — 99291 CRITICAL CARE FIRST HOUR: CPT

## 2020-01-01 PROCEDURE — 85007 BL SMEAR W/DIFF WBC COUNT: CPT | Performed by: NURSE PRACTITIONER

## 2020-01-01 PROCEDURE — 80053 COMPREHEN METABOLIC PANEL: CPT | Performed by: EMERGENCY MEDICINE

## 2020-01-01 PROCEDURE — 25010000002 CALCIUM GLUCONATE 2-0.675 GM/100ML-% SOLUTION: Performed by: NURSE PRACTITIONER

## 2020-01-01 PROCEDURE — 70450 CT HEAD/BRAIN W/O DYE: CPT

## 2020-01-01 PROCEDURE — 83605 ASSAY OF LACTIC ACID: CPT

## 2020-01-01 PROCEDURE — 25010000002 FOSPHENYTOIN 500 MG PE/10ML SOLUTION 10 ML VIAL: Performed by: NURSE PRACTITIONER

## 2020-01-01 PROCEDURE — 93010 ELECTROCARDIOGRAM REPORT: CPT | Performed by: INTERNAL MEDICINE

## 2020-01-01 PROCEDURE — 93005 ELECTROCARDIOGRAM TRACING: CPT | Performed by: NURSE PRACTITIONER

## 2020-01-01 PROCEDURE — 5A1945Z RESPIRATORY VENTILATION, 24-96 CONSECUTIVE HOURS: ICD-10-PCS | Performed by: INTERNAL MEDICINE

## 2020-01-01 PROCEDURE — 85018 HEMOGLOBIN: CPT

## 2020-01-01 PROCEDURE — 25010000003 MAGNESIUM SULFATE 4 GM/100ML SOLUTION: Performed by: NURSE PRACTITIONER

## 2020-01-01 PROCEDURE — 93306 TTE W/DOPPLER COMPLETE: CPT | Performed by: INTERNAL MEDICINE

## 2020-01-01 PROCEDURE — 74018 RADEX ABDOMEN 1 VIEW: CPT

## 2020-01-01 PROCEDURE — 93306 TTE W/DOPPLER COMPLETE: CPT

## 2020-01-01 PROCEDURE — 25010000002 VANCOMYCIN 10 G RECONSTITUTED SOLUTION: Performed by: INTERNAL MEDICINE

## 2020-01-01 PROCEDURE — 63710000001 INSULIN REGULAR HUMAN PER 5 UNITS: Performed by: NURSE PRACTITIONER

## 2020-01-01 PROCEDURE — 25010000002 AMIODARONE PER 30 MG: Performed by: EMERGENCY MEDICINE

## 2020-01-01 PROCEDURE — 25010000002 VANCOMYCIN 10 G RECONSTITUTED SOLUTION: Performed by: NURSE PRACTITIONER

## 2020-01-01 PROCEDURE — 84484 ASSAY OF TROPONIN QUANT: CPT | Performed by: EMERGENCY MEDICINE

## 2020-01-01 PROCEDURE — 85007 BL SMEAR W/DIFF WBC COUNT: CPT | Performed by: EMERGENCY MEDICINE

## 2020-01-01 PROCEDURE — 99233 SBSQ HOSP IP/OBS HIGH 50: CPT | Performed by: INTERNAL MEDICINE

## 2020-01-01 PROCEDURE — B548ZZA ULTRASONOGRAPHY OF SUPERIOR VENA CAVA, GUIDANCE: ICD-10-PCS | Performed by: INTERNAL MEDICINE

## 2020-01-01 PROCEDURE — 4A133B3 MONITORING OF ARTERIAL PRESSURE, PULMONARY, PERCUTANEOUS APPROACH: ICD-10-PCS | Performed by: NURSE PRACTITIONER

## 2020-01-01 PROCEDURE — 84484 ASSAY OF TROPONIN QUANT: CPT | Performed by: NURSE PRACTITIONER

## 2020-01-01 PROCEDURE — 85025 COMPLETE CBC W/AUTO DIFF WBC: CPT | Performed by: EMERGENCY MEDICINE

## 2020-01-01 PROCEDURE — 85730 THROMBOPLASTIN TIME PARTIAL: CPT | Performed by: INTERNAL MEDICINE

## 2020-01-01 PROCEDURE — 25010000002 FUROSEMIDE PER 20 MG: Performed by: NURSE PRACTITIONER

## 2020-01-01 PROCEDURE — 85025 COMPLETE CBC W/AUTO DIFF WBC: CPT | Performed by: INTERNAL MEDICINE

## 2020-01-01 RX ORDER — AMIODARONE HCL/D5W 450 MG/250
1 PLASTIC BAG, INJECTION (ML) INTRAVENOUS CONTINUOUS
Status: ACTIVE | OUTPATIENT
Start: 2020-01-01 | End: 2020-01-01

## 2020-01-01 RX ORDER — AMIODARONE HCL/D5W 450 MG/250
0.5 PLASTIC BAG, INJECTION (ML) INTRAVENOUS CONTINUOUS
Status: DISCONTINUED | OUTPATIENT
Start: 2020-01-01 | End: 2020-01-01

## 2020-01-01 RX ORDER — NOREPINEPHRINE BIT/0.9 % NACL 8 MG/250ML
INFUSION BOTTLE (ML) INTRAVENOUS
Status: DISPENSED
Start: 2020-01-01 | End: 2020-01-01

## 2020-01-01 RX ORDER — NOREPINEPHRINE BIT/0.9 % NACL 8 MG/250ML
.02-.3 INFUSION BOTTLE (ML) INTRAVENOUS
Status: DISCONTINUED | OUTPATIENT
Start: 2020-01-01 | End: 2020-01-01 | Stop reason: SDUPTHER

## 2020-01-01 RX ORDER — HALOPERIDOL 5 MG/ML
1 INJECTION INTRAMUSCULAR EVERY 4 HOURS PRN
Status: DISCONTINUED | OUTPATIENT
Start: 2020-01-01 | End: 2020-01-01 | Stop reason: HOSPADM

## 2020-01-01 RX ORDER — CARBOXYMETHYLCELLULOSE SODIUM 10 MG/ML
1 GEL OPHTHALMIC
Status: DISCONTINUED | OUTPATIENT
Start: 2020-01-01 | End: 2020-01-01 | Stop reason: HOSPADM

## 2020-01-01 RX ORDER — LORAZEPAM 2 MG/ML
0.5 INJECTION INTRAMUSCULAR
Status: DISCONTINUED | OUTPATIENT
Start: 2020-01-01 | End: 2020-01-01 | Stop reason: HOSPADM

## 2020-01-01 RX ORDER — LORAZEPAM 2 MG/ML
0.5 CONCENTRATE ORAL
Status: DISCONTINUED | OUTPATIENT
Start: 2020-01-01 | End: 2020-01-01 | Stop reason: HOSPADM

## 2020-01-01 RX ORDER — LORAZEPAM 1 MG/1
1 TABLET ORAL
Status: DISCONTINUED | OUTPATIENT
Start: 2020-01-01 | End: 2020-01-01 | Stop reason: HOSPADM

## 2020-01-01 RX ORDER — MORPHINE SULFATE 4 MG/ML
2 INJECTION, SOLUTION INTRAMUSCULAR; INTRAVENOUS
Status: DISCONTINUED | OUTPATIENT
Start: 2020-01-01 | End: 2020-01-01 | Stop reason: HOSPADM

## 2020-01-01 RX ORDER — PROPOFOL 10 MG/ML
VIAL (ML) INTRAVENOUS
Status: DISPENSED
Start: 2020-01-01 | End: 2020-01-01

## 2020-01-01 RX ORDER — BUMETANIDE 0.25 MG/ML
2 INJECTION INTRAMUSCULAR; INTRAVENOUS ONCE
Status: COMPLETED | OUTPATIENT
Start: 2020-01-01 | End: 2020-01-01

## 2020-01-01 RX ORDER — FENTANYL CITRATE-0.9 % NACL/PF 10 MCG/ML
50-300 PLASTIC BAG, INJECTION (ML) INTRAVENOUS CONTINUOUS PRN
Status: DISCONTINUED | OUTPATIENT
Start: 2020-01-01 | End: 2020-01-01

## 2020-01-01 RX ORDER — LORAZEPAM 2 MG/ML
1 CONCENTRATE ORAL
Status: DISCONTINUED | OUTPATIENT
Start: 2020-01-01 | End: 2020-01-01 | Stop reason: HOSPADM

## 2020-01-01 RX ORDER — AMIODARONE HCL/D5W 450 MG/250
PLASTIC BAG, INJECTION (ML) INTRAVENOUS
Status: DISPENSED
Start: 2020-01-01 | End: 2020-01-01

## 2020-01-01 RX ORDER — PANTOPRAZOLE SODIUM 40 MG/10ML
40 INJECTION, POWDER, LYOPHILIZED, FOR SOLUTION INTRAVENOUS
Status: DISCONTINUED | OUTPATIENT
Start: 2020-01-01 | End: 2020-01-01

## 2020-01-01 RX ORDER — HALOPERIDOL 2 MG/ML
2 SOLUTION ORAL EVERY 4 HOURS PRN
Status: DISCONTINUED | OUTPATIENT
Start: 2020-01-01 | End: 2020-01-01 | Stop reason: HOSPADM

## 2020-01-01 RX ORDER — LORAZEPAM 2 MG/ML
1 INJECTION INTRAMUSCULAR
Status: DISCONTINUED | OUTPATIENT
Start: 2020-01-01 | End: 2020-01-01 | Stop reason: HOSPADM

## 2020-01-01 RX ORDER — HYDROCHLOROTHIAZIDE 12.5 MG/1
CAPSULE, GELATIN COATED ORAL
Qty: 90 CAPSULE | Refills: 3 | Status: SHIPPED | OUTPATIENT
Start: 2020-01-01

## 2020-01-01 RX ORDER — MIDAZOLAM HYDROCHLORIDE 1 MG/ML
2 INJECTION INTRAMUSCULAR; INTRAVENOUS ONCE
Status: COMPLETED | OUTPATIENT
Start: 2020-01-01 | End: 2020-01-01

## 2020-01-01 RX ORDER — VECURONIUM BROMIDE FOR INJECTION 1 MG/ML
0.1 INJECTION, POWDER, LYOPHILIZED, FOR SOLUTION INTRAVENOUS
Status: DISCONTINUED | OUTPATIENT
Start: 2020-01-01 | End: 2020-01-01

## 2020-01-01 RX ORDER — DOPAMINE HYDROCHLORIDE 160 MG/100ML
2-20 INJECTION, SOLUTION INTRAVENOUS
Status: DISCONTINUED | OUTPATIENT
Start: 2020-01-01 | End: 2020-01-01

## 2020-01-01 RX ORDER — CALCIUM GLUCONATE 20 MG/ML
2 INJECTION, SOLUTION INTRAVENOUS ONCE
Status: COMPLETED | OUTPATIENT
Start: 2020-01-01 | End: 2020-01-01

## 2020-01-01 RX ORDER — LORAZEPAM 2 MG/ML
2 CONCENTRATE ORAL
Status: DISCONTINUED | OUTPATIENT
Start: 2020-01-01 | End: 2020-01-01 | Stop reason: HOSPADM

## 2020-01-01 RX ORDER — SODIUM CHLORIDE 0.9 % (FLUSH) 0.9 %
10 SYRINGE (ML) INJECTION EVERY 12 HOURS SCHEDULED
Status: DISCONTINUED | OUTPATIENT
Start: 2020-01-01 | End: 2020-01-01 | Stop reason: HOSPADM

## 2020-01-01 RX ORDER — FUROSEMIDE 10 MG/ML
40 INJECTION INTRAMUSCULAR; INTRAVENOUS ONCE
Status: COMPLETED | OUTPATIENT
Start: 2020-01-01 | End: 2020-01-01

## 2020-01-01 RX ORDER — HALOPERIDOL 5 MG/ML
2 INJECTION INTRAMUSCULAR EVERY 4 HOURS PRN
Status: DISCONTINUED | OUTPATIENT
Start: 2020-01-01 | End: 2020-01-01 | Stop reason: HOSPADM

## 2020-01-01 RX ORDER — DIPHENOXYLATE HYDROCHLORIDE AND ATROPINE SULFATE 2.5; .025 MG/1; MG/1
1 TABLET ORAL
Status: DISCONTINUED | OUTPATIENT
Start: 2020-01-01 | End: 2020-01-01 | Stop reason: HOSPADM

## 2020-01-01 RX ORDER — MAGNESIUM SULFATE HEPTAHYDRATE 40 MG/ML
4 INJECTION, SOLUTION INTRAVENOUS AS NEEDED
Status: DISCONTINUED | OUTPATIENT
Start: 2020-01-01 | End: 2020-01-01 | Stop reason: HOSPADM

## 2020-01-01 RX ORDER — HYDRALAZINE HYDROCHLORIDE 100 MG/1
TABLET, FILM COATED ORAL
Qty: 120 TABLET | Refills: 4 | Status: SHIPPED | OUTPATIENT
Start: 2020-01-01

## 2020-01-01 RX ORDER — CALCIUM GLUCONATE 20 MG/ML
1 INJECTION, SOLUTION INTRAVENOUS ONCE
Status: COMPLETED | OUTPATIENT
Start: 2020-01-01 | End: 2020-01-01

## 2020-01-01 RX ORDER — DEXTROSE MONOHYDRATE 25 G/50ML
25-50 INJECTION, SOLUTION INTRAVENOUS
Status: DISCONTINUED | OUTPATIENT
Start: 2020-01-01 | End: 2020-01-01

## 2020-01-01 RX ORDER — LORAZEPAM 1 MG/1
2 TABLET ORAL
Status: DISCONTINUED | OUTPATIENT
Start: 2020-01-01 | End: 2020-01-01 | Stop reason: HOSPADM

## 2020-01-01 RX ORDER — SODIUM CHLORIDE 0.9 % (FLUSH) 0.9 %
10 SYRINGE (ML) INJECTION AS NEEDED
Status: DISCONTINUED | OUTPATIENT
Start: 2020-01-01 | End: 2020-01-01 | Stop reason: HOSPADM

## 2020-01-01 RX ORDER — MORPHINE SULFATE 4 MG/ML
4 INJECTION, SOLUTION INTRAMUSCULAR; INTRAVENOUS
Status: DISCONTINUED | OUTPATIENT
Start: 2020-01-01 | End: 2020-01-01 | Stop reason: HOSPADM

## 2020-01-01 RX ORDER — POTASSIUM CHLORIDE 29.8 MG/ML
20 INJECTION INTRAVENOUS
Status: DISPENSED | OUTPATIENT
Start: 2020-01-01 | End: 2020-01-01

## 2020-01-01 RX ORDER — NITROGLYCERIN 20 MG/100ML
10-50 INJECTION INTRAVENOUS
Status: DISCONTINUED | OUTPATIENT
Start: 2020-01-01 | End: 2020-01-01

## 2020-01-01 RX ORDER — LORAZEPAM 2 MG/ML
2 INJECTION INTRAMUSCULAR
Status: DISCONTINUED | OUTPATIENT
Start: 2020-01-01 | End: 2020-01-01 | Stop reason: HOSPADM

## 2020-01-01 RX ORDER — INSULIN LISPRO 100 [IU]/ML
0-7 INJECTION, SOLUTION INTRAVENOUS; SUBCUTANEOUS AS NEEDED
Status: DISCONTINUED | OUTPATIENT
Start: 2020-01-01 | End: 2020-01-01

## 2020-01-01 RX ORDER — DEXTROSE MONOHYDRATE 25 G/50ML
25 INJECTION, SOLUTION INTRAVENOUS
Status: DISCONTINUED | OUTPATIENT
Start: 2020-01-01 | End: 2020-01-01

## 2020-01-01 RX ORDER — SCOLOPAMINE TRANSDERMAL SYSTEM 1 MG/1
1 PATCH, EXTENDED RELEASE TRANSDERMAL
Status: DISCONTINUED | OUTPATIENT
Start: 2020-01-01 | End: 2020-01-01 | Stop reason: HOSPADM

## 2020-01-01 RX ORDER — NITROGLYCERIN 20 MG/100ML
INJECTION INTRAVENOUS
Status: COMPLETED
Start: 2020-01-01 | End: 2020-01-01

## 2020-01-01 RX ORDER — HALOPERIDOL 2 MG/ML
1 SOLUTION ORAL EVERY 4 HOURS PRN
Status: DISCONTINUED | OUTPATIENT
Start: 2020-01-01 | End: 2020-01-01 | Stop reason: HOSPADM

## 2020-01-01 RX ORDER — VANCOMYCIN 1.75 GRAM/500 ML IN 0.9 % SODIUM CHLORIDE INTRAVENOUS
20 ONCE
Status: COMPLETED | OUTPATIENT
Start: 2020-01-01 | End: 2020-01-01

## 2020-01-01 RX ORDER — LORAZEPAM 0.5 MG/1
0.5 TABLET ORAL
Status: DISCONTINUED | OUTPATIENT
Start: 2020-01-01 | End: 2020-01-01 | Stop reason: HOSPADM

## 2020-01-01 RX ORDER — NICOTINE POLACRILEX 4 MG
15 LOZENGE BUCCAL
Status: DISCONTINUED | OUTPATIENT
Start: 2020-01-01 | End: 2020-01-01

## 2020-01-01 RX ORDER — HALOPERIDOL 1 MG/1
1 TABLET ORAL EVERY 4 HOURS PRN
Status: DISCONTINUED | OUTPATIENT
Start: 2020-01-01 | End: 2020-01-01 | Stop reason: HOSPADM

## 2020-01-01 RX ORDER — HEPARIN SODIUM 5000 [USP'U]/ML
5000 INJECTION, SOLUTION INTRAVENOUS; SUBCUTANEOUS EVERY 12 HOURS SCHEDULED
Status: DISCONTINUED | OUTPATIENT
Start: 2020-01-01 | End: 2020-01-01

## 2020-01-01 RX ORDER — INSULIN LISPRO 100 [IU]/ML
0-7 INJECTION, SOLUTION INTRAVENOUS; SUBCUTANEOUS EVERY 6 HOURS SCHEDULED
Status: DISCONTINUED | OUTPATIENT
Start: 2020-01-01 | End: 2020-01-01

## 2020-01-01 RX ORDER — ONDANSETRON 2 MG/ML
4 INJECTION INTRAMUSCULAR; INTRAVENOUS EVERY 6 HOURS PRN
Status: DISCONTINUED | OUTPATIENT
Start: 2020-01-01 | End: 2020-01-01 | Stop reason: HOSPADM

## 2020-01-01 RX ORDER — BUPIVACAINE HCL/0.9 % NACL/PF 0.25 %
.02-.3 PLASTIC BAG, INJECTION (ML) EPIDURAL CONTINUOUS PRN
Status: DISCONTINUED | OUTPATIENT
Start: 2020-01-01 | End: 2020-01-01

## 2020-01-01 RX ORDER — HALOPERIDOL 2 MG/1
2 TABLET ORAL EVERY 4 HOURS PRN
Status: DISCONTINUED | OUTPATIENT
Start: 2020-01-01 | End: 2020-01-01 | Stop reason: HOSPADM

## 2020-01-01 RX ADMIN — MINERAL OIL, AND WHITE PETROLATUM: 425; 573 OINTMENT OPHTHALMIC at 12:55

## 2020-01-01 RX ADMIN — NITROGLYCERIN 15 MCG/MIN: 20 INJECTION INTRAVENOUS at 21:26

## 2020-01-01 RX ADMIN — FOSPHENYTOIN SODIUM 200 MG PE: 50 INJECTION, SOLUTION INTRAMUSCULAR; INTRAVENOUS at 08:13

## 2020-01-01 RX ADMIN — GLYCOPYRROLATE 0.4 MG: 0.2 INJECTION, SOLUTION INTRAMUSCULAR; INTRAVITREAL at 23:13

## 2020-01-01 RX ADMIN — LORAZEPAM 2 MG: 2 INJECTION INTRAMUSCULAR; INTRAVENOUS at 00:44

## 2020-01-01 RX ADMIN — MINERAL OIL, AND WHITE PETROLATUM: 425; 573 OINTMENT OPHTHALMIC at 14:25

## 2020-01-01 RX ADMIN — PANTOPRAZOLE SODIUM 40 MG: 40 INJECTION, POWDER, FOR SOLUTION INTRAVENOUS at 05:42

## 2020-01-01 RX ADMIN — MORPHINE SULFATE 4 MG: 4 INJECTION INTRAVENOUS at 23:14

## 2020-01-01 RX ADMIN — MINERAL OIL, AND WHITE PETROLATUM: 425; 573 OINTMENT OPHTHALMIC at 18:25

## 2020-01-01 RX ADMIN — DOPAMINE HYDROCHLORIDE IN DEXTROSE 5 MCG/KG/MIN: 1.6 INJECTION, SOLUTION INTRAVENOUS at 14:10

## 2020-01-01 RX ADMIN — MINERAL OIL, AND WHITE PETROLATUM: 425; 573 OINTMENT OPHTHALMIC at 21:23

## 2020-01-01 RX ADMIN — MORPHINE SULFATE 4 MG: 4 INJECTION INTRAVENOUS at 15:55

## 2020-01-01 RX ADMIN — HEPARIN SODIUM 5000 UNITS: 5000 INJECTION INTRAVENOUS; SUBCUTANEOUS at 11:38

## 2020-01-01 RX ADMIN — CEFEPIME HYDROCHLORIDE 2 G: 2 INJECTION, POWDER, FOR SOLUTION INTRAVENOUS at 08:58

## 2020-01-01 RX ADMIN — VECURONIUM BROMIDE 8.2 MG: 1 INJECTION, POWDER, LYOPHILIZED, FOR SOLUTION INTRAVENOUS at 21:18

## 2020-01-01 RX ADMIN — CALCIUM GLUCONATE 2 G: 20 INJECTION, SOLUTION INTRAVENOUS at 20:18

## 2020-01-01 RX ADMIN — FOSPHENYTOIN SODIUM 200 MG PE: 50 INJECTION, SOLUTION INTRAMUSCULAR; INTRAVENOUS at 09:15

## 2020-01-01 RX ADMIN — MINERAL OIL, AND WHITE PETROLATUM: 425; 573 OINTMENT OPHTHALMIC at 00:39

## 2020-01-01 RX ADMIN — CEFEPIME HYDROCHLORIDE 2 G: 2 INJECTION, POWDER, FOR SOLUTION INTRAVENOUS at 22:04

## 2020-01-01 RX ADMIN — GLYCOPYRROLATE 0.4 MG: 0.2 INJECTION, SOLUTION INTRAMUSCULAR; INTRAVITREAL at 20:39

## 2020-01-01 RX ADMIN — Medication 10 ML: at 20:38

## 2020-01-01 RX ADMIN — FOSPHENYTOIN SODIUM 200 MG PE: 50 INJECTION, SOLUTION INTRAMUSCULAR; INTRAVENOUS at 20:41

## 2020-01-01 RX ADMIN — PROPOFOL 25 MCG/KG/MIN: 10 INJECTION, EMULSION INTRAVENOUS at 08:13

## 2020-01-01 RX ADMIN — CEFEPIME HYDROCHLORIDE 2 G: 2 INJECTION, POWDER, FOR SOLUTION INTRAVENOUS at 08:13

## 2020-01-01 RX ADMIN — SCOPALAMINE 1 PATCH: 1 PATCH, EXTENDED RELEASE TRANSDERMAL at 18:02

## 2020-01-01 RX ADMIN — GLYCOPYRROLATE 0.4 MG: 0.2 INJECTION, SOLUTION INTRAMUSCULAR; INTRAVITREAL at 17:27

## 2020-01-01 RX ADMIN — PROPOFOL 20 MCG/KG/MIN: 10 INJECTION, EMULSION INTRAVENOUS at 21:01

## 2020-01-01 RX ADMIN — MAGNESIUM SULFATE HEPTAHYDRATE 4 G: 40 INJECTION, SOLUTION INTRAVENOUS at 02:50

## 2020-01-01 RX ADMIN — PROPOFOL 25 MCG/KG/MIN: 10 INJECTION, EMULSION INTRAVENOUS at 14:35

## 2020-01-01 RX ADMIN — MINERAL OIL, AND WHITE PETROLATUM: 425; 573 OINTMENT OPHTHALMIC at 03:37

## 2020-01-01 RX ADMIN — SODIUM BICARBONATE 75 MEQ: 84 INJECTION, SOLUTION INTRAVENOUS at 02:03

## 2020-01-01 RX ADMIN — PANTOPRAZOLE SODIUM 40 MG: 40 INJECTION, POWDER, FOR SOLUTION INTRAVENOUS at 05:14

## 2020-01-01 RX ADMIN — Medication 10 ML: at 20:32

## 2020-01-01 RX ADMIN — LORAZEPAM 2 MG: 2 INJECTION INTRAMUSCULAR; INTRAVENOUS at 14:13

## 2020-01-01 RX ADMIN — Medication 10 ML: at 08:56

## 2020-01-01 RX ADMIN — MINERAL OIL, AND WHITE PETROLATUM: 425; 573 OINTMENT OPHTHALMIC at 10:17

## 2020-01-01 RX ADMIN — SODIUM CHLORIDE 2 UNITS/HR: 9 INJECTION, SOLUTION INTRAVENOUS at 02:06

## 2020-01-01 RX ADMIN — SODIUM CHLORIDE 50 MCG/HR: 9 INJECTION, SOLUTION INTRAVENOUS at 20:18

## 2020-01-01 RX ADMIN — SODIUM BICARBONATE 75 MEQ: 84 INJECTION, SOLUTION INTRAVENOUS at 12:57

## 2020-01-01 RX ADMIN — PROPOFOL 25 MCG/KG/MIN: 10 INJECTION, EMULSION INTRAVENOUS at 06:09

## 2020-01-01 RX ADMIN — MINERAL OIL, AND WHITE PETROLATUM: 425; 573 OINTMENT OPHTHALMIC at 05:53

## 2020-01-01 RX ADMIN — SODIUM CHLORIDE 0.4 MCG/KG/MIN: 9 INJECTION, SOLUTION INTRAVENOUS at 06:08

## 2020-01-01 RX ADMIN — SODIUM BICARBONATE 75 MEQ: 84 INJECTION, SOLUTION INTRAVENOUS at 07:36

## 2020-01-01 RX ADMIN — PANTOPRAZOLE SODIUM 40 MG: 40 INJECTION, POWDER, FOR SOLUTION INTRAVENOUS at 06:10

## 2020-01-01 RX ADMIN — Medication 10 ML: at 08:46

## 2020-01-01 RX ADMIN — MINERAL OIL, AND WHITE PETROLATUM: 425; 573 OINTMENT OPHTHALMIC at 16:13

## 2020-01-01 RX ADMIN — FOSPHENYTOIN SODIUM 200 MG PE: 50 INJECTION, SOLUTION INTRAMUSCULAR; INTRAVENOUS at 21:13

## 2020-01-01 RX ADMIN — MORPHINE SULFATE 4 MG: 4 INJECTION INTRAVENOUS at 14:13

## 2020-01-01 RX ADMIN — MORPHINE SULFATE 4 MG: 4 INJECTION INTRAVENOUS at 20:38

## 2020-01-01 RX ADMIN — SODIUM CHLORIDE 5 UNITS/HR: 9 INJECTION, SOLUTION INTRAVENOUS at 19:43

## 2020-01-01 RX ADMIN — MINERAL OIL, AND WHITE PETROLATUM: 425; 573 OINTMENT OPHTHALMIC at 03:59

## 2020-01-01 RX ADMIN — FOSPHENYTOIN SODIUM 200 MG PE: 50 INJECTION, SOLUTION INTRAMUSCULAR; INTRAVENOUS at 08:45

## 2020-01-01 RX ADMIN — MINERAL OIL, AND WHITE PETROLATUM: 425; 573 OINTMENT OPHTHALMIC at 08:56

## 2020-01-01 RX ADMIN — SODIUM CHLORIDE 1000 MG PE: 9 INJECTION, SOLUTION INTRAVENOUS at 22:03

## 2020-01-01 RX ADMIN — HEPARIN SODIUM 5000 UNITS: 5000 INJECTION INTRAVENOUS; SUBCUTANEOUS at 20:41

## 2020-01-01 RX ADMIN — MORPHINE SULFATE 4 MG: 4 INJECTION INTRAVENOUS at 17:27

## 2020-01-01 RX ADMIN — Medication 10 ML: at 20:10

## 2020-01-01 RX ADMIN — FUROSEMIDE 40 MG: 10 INJECTION, SOLUTION INTRAMUSCULAR; INTRAVENOUS at 20:17

## 2020-01-01 RX ADMIN — MINERAL OIL, AND WHITE PETROLATUM: 425; 573 OINTMENT OPHTHALMIC at 22:07

## 2020-01-01 RX ADMIN — LORAZEPAM 2 MG: 2 INJECTION INTRAMUSCULAR; INTRAVENOUS at 23:14

## 2020-01-01 RX ADMIN — VANCOMYCIN HYDROCHLORIDE 1750 MG: 10 INJECTION, POWDER, LYOPHILIZED, FOR SOLUTION INTRAVENOUS at 22:04

## 2020-01-01 RX ADMIN — VECURONIUM BROMIDE 8.2 MG: 1 INJECTION, POWDER, LYOPHILIZED, FOR SOLUTION INTRAVENOUS at 17:22

## 2020-01-01 RX ADMIN — MIDAZOLAM 2 MG: 1 INJECTION INTRAMUSCULAR; INTRAVENOUS at 17:22

## 2020-01-01 RX ADMIN — PROPOFOL 20 MCG/KG/MIN: 10 INJECTION, EMULSION INTRAVENOUS at 03:59

## 2020-01-01 RX ADMIN — MINERAL OIL, AND WHITE PETROLATUM: 425; 573 OINTMENT OPHTHALMIC at 00:20

## 2020-01-01 RX ADMIN — Medication 10 ML: at 20:41

## 2020-01-01 RX ADMIN — MORPHINE SULFATE 4 MG: 4 INJECTION INTRAVENOUS at 00:44

## 2020-01-01 RX ADMIN — SODIUM BICARBONATE 75 MEQ: 84 INJECTION, SOLUTION INTRAVENOUS at 18:25

## 2020-01-01 RX ADMIN — HEPARIN SODIUM 5000 UNITS: 5000 INJECTION INTRAVENOUS; SUBCUTANEOUS at 08:45

## 2020-01-01 RX ADMIN — SODIUM CHLORIDE 1250 MG: 9 INJECTION, SOLUTION INTRAVENOUS at 20:06

## 2020-01-01 RX ADMIN — PROPOFOL 25 MCG/KG/MIN: 10 INJECTION, EMULSION INTRAVENOUS at 22:15

## 2020-01-01 RX ADMIN — MINERAL OIL, AND WHITE PETROLATUM: 425; 573 OINTMENT OPHTHALMIC at 02:06

## 2020-01-01 RX ADMIN — PROPOFOL 15 MCG/KG/MIN: 10 INJECTION, EMULSION INTRAVENOUS at 19:43

## 2020-01-01 RX ADMIN — POTASSIUM CHLORIDE 20 MEQ: 29.8 INJECTION, SOLUTION INTRAVENOUS at 02:34

## 2020-01-01 RX ADMIN — SODIUM CHLORIDE 50 MCG/HR: 9 INJECTION, SOLUTION INTRAVENOUS at 13:42

## 2020-01-01 RX ADMIN — PROPOFOL 30 MCG/KG/MIN: 10 INJECTION, EMULSION INTRAVENOUS at 13:58

## 2020-01-01 RX ADMIN — MINERAL OIL, AND WHITE PETROLATUM 1 APPLICATION: 425; 573 OINTMENT OPHTHALMIC at 19:46

## 2020-01-01 RX ADMIN — Medication 10 ML: at 08:14

## 2020-01-01 RX ADMIN — CALCIUM GLUCONATE 1 G: 20 INJECTION, SOLUTION INTRAVENOUS at 11:38

## 2020-01-01 RX ADMIN — BUMETANIDE 2 MG: 0.25 INJECTION, SOLUTION INTRAMUSCULAR; INTRAVENOUS at 16:13

## 2020-01-01 RX ADMIN — MINERAL OIL, AND WHITE PETROLATUM: 425; 573 OINTMENT OPHTHALMIC at 08:13

## 2020-01-01 RX ADMIN — MINERAL OIL, AND WHITE PETROLATUM: 425; 573 OINTMENT OPHTHALMIC at 05:42

## 2020-01-01 RX ADMIN — Medication: at 18:35

## 2020-01-01 RX ADMIN — LORAZEPAM 1 MG: 2 INJECTION INTRAMUSCULAR; INTRAVENOUS at 20:40

## 2020-01-01 RX ADMIN — SODIUM BICARBONATE 75 MEQ: 84 INJECTION, SOLUTION INTRAVENOUS at 03:59

## 2020-01-01 RX ADMIN — GLYCOPYRROLATE 0.4 MG: 0.2 INJECTION, SOLUTION INTRAMUSCULAR; INTRAVITREAL at 14:13

## 2020-01-01 RX ADMIN — SODIUM CHLORIDE 75 MCG/HR: 9 INJECTION, SOLUTION INTRAVENOUS at 02:43

## 2020-01-01 RX ADMIN — AMIODARONE HYDROCHLORIDE 1 MG/MIN: 50 INJECTION, SOLUTION INTRAVENOUS at 22:04

## 2020-01-01 RX ADMIN — MINERAL OIL, AND WHITE PETROLATUM: 425; 573 OINTMENT OPHTHALMIC at 02:43

## 2020-01-01 RX ADMIN — POTASSIUM CHLORIDE 20 MEQ: 29.8 INJECTION, SOLUTION INTRAVENOUS at 03:37

## 2020-01-24 PROBLEM — I25.10 CORONARY ARTERY DISEASE INVOLVING NATIVE CORONARY ARTERY OF NATIVE HEART WITHOUT ANGINA PECTORIS: Status: ACTIVE | Noted: 2020-01-01

## 2020-01-24 PROBLEM — E78.2 MIXED HYPERLIPIDEMIA: Status: ACTIVE | Noted: 2019-08-05

## 2020-02-03 PROBLEM — N40.0 BENIGN PROSTATIC HYPERPLASIA: Status: ACTIVE | Noted: 2020-01-01

## 2020-02-03 PROBLEM — N32.3 DIVERTICULUM OF BLADDER: Status: ACTIVE | Noted: 2020-01-01

## 2020-02-03 PROBLEM — C44.311 BASAL CELL CARCINOMA OF NOSE: Status: ACTIVE | Noted: 2020-01-01

## 2020-02-03 PROBLEM — N40.0 ENLARGED PROSTATE ON RECTAL EXAMINATION: Status: ACTIVE | Noted: 2020-01-01

## 2020-02-03 PROBLEM — N28.1 SINGLE ACQUIRED CYST OF KIDNEY: Status: ACTIVE | Noted: 2020-01-01

## 2020-02-03 PROBLEM — K21.9 GASTROESOPHAGEAL REFLUX DISEASE: Status: ACTIVE | Noted: 2020-01-01

## 2020-02-03 NOTE — PROGRESS NOTES
Date of Office Visit: 2020  Encounter Provider: Dr.Syed Lema  Place of Service: [facility] CARDIOLOGY [place]  Patient Name: Primitivo Contreras  :1935  Hawa Estevez NP    Chief Complaint   Patient presents with   • Coronary Artery Disease     6 month follow up   • Hyperlipidemia   • Hypertension   • Palpitations     History of Present Illness    I am pleased to see Mr. Dash my office today as a follow-up.    As you know, patient is to 84 years old white gentleman whose past medical history significant for hypertension, hyperlipidemia, CAD, coronary artery stenting, who came today for follow-up.    In , patient had symptom of angina pectoris and underwent cardiac catheterization and he underwent coronary artery stenting.  Details of this procedure is not available to me.    In 2019, patient underwent stress test which showed no myocardial ischemia but small fixed apical defect was noted consistent with myocardial infarction.  Gated SPECT imaging showed LVEF of 49%.    Since the previous visit, patient is doing fairly well.  Patient denies any symptom of chest pain or tightness or heaviness.  No orthopnea, PND, syncope or presyncope noted.  No leg edema noted.  No palpitation.    EKG done in my office showed normal sinus rhythm.  No ST changes.  No change from previous EKG.    Overall patient is doing fairly well from cardiovascular standpoint.  I am overall pleased with the patient progress.  His blood pressure is slightly elevated today but it is much better than before.  His blood pressure readings at home's are within desirable range.  Therefore I would not recommend to increase any medication at this stage.  Patient is advised to monitor the blood pressure at home and bring the logbook on next visit.    Past Medical History:   Diagnosis Date   • Coronary artery disease    • Hyperlipidemia    • Hypertension          Past Surgical History:   Procedure Laterality Date   •  CARDIAC CATHETERIZATION     • CORONARY STENT PLACEMENT             Current Outpatient Medications:   •  aspirin (ADULT ASPIRIN EC LOW STRENGTH) 81 MG EC tablet, Daily., Disp: , Rfl:   •  atorvastatin (LIPITOR) 40 MG tablet, Take 40 mg by mouth Daily., Disp: , Rfl: 3  •  clopidogrel (PLAVIX) 75 MG tablet, Take 75 mg by mouth Daily., Disp: , Rfl: 3  •  dilTIAZem CD (CARDIZEM CD) 120 MG 24 hr capsule, Take 1 capsule by mouth 2 (Two) Times a Day., Disp: 180 capsule, Rfl: 3  •  hydrALAZINE (APRESOLINE) 100 MG tablet, Take 1 tablet by mouth Every 12 (Twelve) Hours., Disp: 120 tablet, Rfl: 4  •  hydroCHLOROthiazide (MICROZIDE) 12.5 MG capsule, Take 1 capsule by mouth Daily., Disp: 30 capsule, Rfl: 11  •  HYDROcodone-acetaminophen (NORCO)  MG per tablet, As Needed., Disp: , Rfl: 0  •  omeprazole (priLOSEC) 40 MG capsule, Take 40 mg by mouth Daily., Disp: , Rfl: 3  •  ramipril (ALTACE) 10 MG capsule, Daily., Disp: , Rfl:       Social History     Socioeconomic History   • Marital status:      Spouse name: Not on file   • Number of children: Not on file   • Years of education: Not on file   • Highest education level: Not on file   Tobacco Use   • Smoking status: Former Smoker   • Tobacco comment: quit 30 yrs ago   Substance and Sexual Activity   • Alcohol use: Yes     Comment: social/ occasional   • Drug use: No   • Sexual activity: Defer         Review of Systems   Constitution: Negative for chills and fever.   HENT: Negative for ear discharge and nosebleeds.    Eyes: Negative for discharge and redness.   Cardiovascular: Negative for chest pain, orthopnea, palpitations, paroxysmal nocturnal dyspnea and syncope.   Respiratory: Negative for cough, shortness of breath and wheezing.    Endocrine: Negative for heat intolerance.   Skin: Negative for rash.   Musculoskeletal: Negative for arthritis and myalgias.   Gastrointestinal: Negative for abdominal pain, melena, nausea and vomiting.   Genitourinary: Negative for  "dysuria and hematuria.   Neurological: Negative for dizziness, light-headedness, numbness and tremors.   Psychiatric/Behavioral: Negative for depression. The patient is not nervous/anxious.        Procedures    Procedures    No orders to display           Objective:    /63   Pulse 63   Ht 170 cm (66.93\")   Wt 84.4 kg (186 lb)   BMI 29.19 kg/m²         Physical Exam   Constitutional: He is oriented to person, place, and time. He appears well-developed and well-nourished.   HENT:   Head: Normocephalic and atraumatic.   Eyes: Right eye exhibits no discharge. No scleral icterus.   Neck: No thyromegaly present.   Cardiovascular: Normal rate, regular rhythm and normal heart sounds. Exam reveals no gallop and no friction rub.   No murmur heard.  Pulmonary/Chest: Effort normal and breath sounds normal. No respiratory distress. He has no wheezes. He has no rales.   Abdominal: There is no tenderness.   Musculoskeletal: He exhibits no edema.   Lymphadenopathy:     He has no cervical adenopathy.   Neurological: He is alert and oriented to person, place, and time.   Skin: No rash noted. No erythema.   Psychiatric: He has a normal mood and affect.           Assessment:       Diagnosis Plan   1. Mixed hyperlipidemia     2. Essential hypertension     3. Palpitations     4. Coronary artery disease involving native coronary artery of native heart without angina pectoris              Plan:       At this stage, I will continue current therapy.  Patient is advised to monitor the blood pressure at home and bring the logbook.  Continue aerobic activity.  Patient is doing aerobic exercises at the gym without reproduction of any symptom of angina pectoris or congestive heart failure.  "

## 2020-08-03 NOTE — PROGRESS NOTES
Date of Office Visit: 2020  Encounter Provider: Dr. Jose Lema    Place of Service: [facility] CARDIOLOGY [place]  Patient Name: Primitivo Contreras  :1935  Hawa Estevez NP    Chief Complaint   Patient presents with   • Coronary Artery Disease     6 month    • Hyperlipidemia   • Hypertension   • Shortness of Breath     History of Present Illness    I am pleased to see Mr. Dash my office today as a follow-up.    As you know, patient is to 85 years old white gentleman whose past medical history significant for hypertension, hyperlipidemia, CAD, coronary artery stenting, who came today for follow-up.    In , patient had symptom of angina pectoris and underwent cardiac catheterization and he underwent coronary artery stenting.  Details of this procedure is not available to me. In 2019, patient underwent stress test which showed no myocardial ischemia but small fixed apical defect was noted consistent with myocardial infarction.  Gated SPECT imaging showed LVEF of 49%.    This is a six-month follow-up.  Patient is doing overall very well.  Patient is maintaining social distancing.  Patient does exercise at home.  Patient does not reproduce any symptom of chest pain or tightness or heaviness.  Patient denies any orthopnea PND.  Mild shortness of breath noted.  No syncope or presyncope.    His blood pressure is on the upper side of normal.  However he brought his blood pressure logbook and most of the blood pressures are within desirable range.  I will continue current therapy.  Blood pressure monitoring is recommended.  Recent stress test was normal.  I will repeat echocardiogram on next visit.        Past Medical History:   Diagnosis Date   • Coronary artery disease    • Hyperlipidemia    • Hypertension          Past Surgical History:   Procedure Laterality Date   • CARDIAC CATHETERIZATION     • CORONARY STENT PLACEMENT             Current Outpatient Medications:   •  aspirin (ADULT  ASPIRIN EC LOW STRENGTH) 81 MG EC tablet, Daily., Disp: , Rfl:   •  atorvastatin (LIPITOR) 40 MG tablet, Take 40 mg by mouth Daily., Disp: , Rfl: 3  •  clopidogrel (PLAVIX) 75 MG tablet, Take 75 mg by mouth Daily., Disp: , Rfl: 3  •  dilTIAZem CD (CARDIZEM CD) 120 MG 24 hr capsule, Take 1 capsule by mouth 2 (Two) Times a Day., Disp: 180 capsule, Rfl: 3  •  hydrALAZINE (APRESOLINE) 100 MG tablet, TAKE 1 TABLET BY MOUTH EVERY 12 HOURS, Disp: 120 tablet, Rfl: 4  •  hydroCHLOROthiazide (MICROZIDE) 12.5 MG capsule, TAKE 1 CAPSULE BY MOUTH EVERY DAY, Disp: 90 capsule, Rfl: 3  •  HYDROcodone-acetaminophen (NORCO)  MG per tablet, As Needed., Disp: , Rfl: 0  •  omeprazole (priLOSEC) 40 MG capsule, Take 40 mg by mouth Daily., Disp: , Rfl: 3  •  ramipril (ALTACE) 10 MG capsule, Daily., Disp: , Rfl:       Social History     Socioeconomic History   • Marital status:      Spouse name: Not on file   • Number of children: Not on file   • Years of education: Not on file   • Highest education level: Not on file   Tobacco Use   • Smoking status: Former Smoker   • Smokeless tobacco: Never Used   • Tobacco comment: quit 30 yrs ago   Substance and Sexual Activity   • Alcohol use: Yes     Comment: social/ occasional   • Drug use: No   • Sexual activity: Defer         Review of Systems   Constitution: Negative for chills and fever.   HENT: Negative for ear discharge and nosebleeds.    Eyes: Negative for discharge and redness.   Cardiovascular: Negative for chest pain, orthopnea, palpitations, paroxysmal nocturnal dyspnea and syncope.   Respiratory: Positive for shortness of breath. Negative for cough and wheezing.    Endocrine: Negative for heat intolerance.   Skin: Negative for rash.   Musculoskeletal: Positive for arthritis and joint pain. Negative for myalgias.   Gastrointestinal: Negative for abdominal pain, melena, nausea and vomiting.   Genitourinary: Negative for dysuria and hematuria.   Neurological: Negative for  "dizziness, light-headedness, numbness and tremors.   Psychiatric/Behavioral: Negative for depression. The patient is not nervous/anxious.        Procedures    Procedures    No orders to display           Objective:    /60   Pulse 63   Ht 170 cm (66.93\")   Wt 81.6 kg (180 lb)   SpO2 96%   BMI 28.25 kg/m²         Physical Exam   Constitutional: He is oriented to person, place, and time. He appears well-developed and well-nourished.   HENT:   Head: Normocephalic and atraumatic.   Eyes: No scleral icterus.   Neck: No thyromegaly present.   Cardiovascular: Normal rate, regular rhythm and normal heart sounds. Exam reveals no gallop and no friction rub.   No murmur heard.  Pulmonary/Chest: Effort normal and breath sounds normal. No respiratory distress. He has no wheezes. He has no rales.   Abdominal: There is no tenderness.   Musculoskeletal: He exhibits no edema.   Lymphadenopathy:     He has no cervical adenopathy.   Neurological: He is alert and oriented to person, place, and time.   Skin: No rash noted. No erythema.   Psychiatric: He has a normal mood and affect.           Assessment:       Diagnosis Plan   1. Mixed hyperlipidemia     2. Essential hypertension     3. Palpitations     4. Coronary artery disease involving native coronary artery of native heart without angina pectoris     5. Shortness of breath     6. Myocardial infarction, unspecified MI type, unspecified artery (CMS/HCC)              Plan:       Continue current treatment.  Risk factor modification discussed and increased exercise emphasized.  Blood pressure monitoring is recommended.  Repeat echocardiogram on next  "

## 2020-10-07 PROBLEM — D72.829 LEUKOCYTOSIS: Status: ACTIVE | Noted: 2020-01-01

## 2020-10-07 PROBLEM — I46.9 CARDIAC ARREST (HCC): Status: ACTIVE | Noted: 2020-01-01

## 2020-10-07 PROBLEM — R77.8 ELEVATED TROPONIN: Status: ACTIVE | Noted: 2020-01-01

## 2020-10-07 PROBLEM — A41.9 SEPTIC SHOCK (HCC): Status: ACTIVE | Noted: 2020-01-01

## 2020-10-07 PROBLEM — N17.9 AKI (ACUTE KIDNEY INJURY) (HCC): Status: ACTIVE | Noted: 2020-01-01

## 2020-10-07 PROBLEM — J96.00 ACUTE RESPIRATORY FAILURE (HCC): Status: ACTIVE | Noted: 2020-01-01

## 2020-10-07 PROBLEM — E83.51 HYPOCALCEMIA: Status: ACTIVE | Noted: 2020-01-01

## 2020-10-07 PROBLEM — R41.89 UNRESPONSIVE: Status: ACTIVE | Noted: 2020-01-01

## 2020-10-07 PROBLEM — R65.21 SEPTIC SHOCK (HCC): Status: ACTIVE | Noted: 2020-01-01

## 2020-10-07 PROBLEM — D64.9 ANEMIA: Status: ACTIVE | Noted: 2020-01-01

## 2020-10-07 NOTE — PROGRESS NOTES
"Pharmacy Antimicrobial Dosing Service    Subjective:  Primitivo Contreras is a 85 y.o.male admitted s/p cardiac arrest. Pharmacy has been consulted to dose Vancomycin and Cefepime for possible sepsis.    PMH:   Past Medical History:   Diagnosis Date    Coronary artery disease     Hyperlipidemia     Hypertension        Assessment/Plan    1. Day #1 Vancomycin: Goal -600 mcg*h/mL. Ordered a 1750 mg (20 mg/kg) loading dose followed by a 1250 mg (15 mg/kg) q24h maintenance dosing regimen. Will obtain levels between 3rd and 4th dose of this regimen.    2. Day #1 Cefepime: 2 gm IV q24h for estCrCl 30-59 mL/min.    Will continue to monitor drug levels, renal function, culture and sensitivities, and patient clinical status.       Objective:  Relevant clinical data and objective history reviewed:  182.9 cm (72\")   81.6 kg (179 lb 14.3 oz)   Ideal body weight: 77.6 kg (171 lb 1.2 oz)  Adjusted ideal body weight: 79.2 kg (174 lb 9.7 oz)  Body mass index is 24.4 kg/m².        Results from last 7 days   Lab Units 10/07/20  1732   CREATININE mg/dL 1.50*     Estimated Creatinine Clearance: 42.3 mL/min (A) (by C-G formula based on SCr of 1.5 mg/dL (H)).  No intake/output data recorded.    Results from last 7 days   Lab Units 10/07/20  1732   WBC 10*3/mm3 20.30*     There were no vitals filed for this visit.  Baseline culture/source/susceptibility:  Microbiology Results (last 10 days)       ** No results found for the last 240 hours. **             Anti-Infectives (From admission, onward)      Ordered     Dose/Rate Route Frequency Start Stop    10/07/20 1845  !Vancomycin Level Draw Needed     Ordering Provider: Nanci Matute APRN     Does not apply Once 10/10/20 1900      10/07/20 1845  !Vancomycin Level Draw Needed     Ordering Provider: Nanci Matute APRN     Does not apply Once 10/10/20 0000      10/07/20 1845  vancomycin 1250 mg/250 mL 0.9% NS IVPB ([facility])     Ordering Provider: Nanci Matute APRN    15 mg/kg × " 81.6 kg  over 75 Minutes Intravenous Every 24 Hours 10/08/20 2000 10/14/20 1959    10/07/20 1845  cefepime 2 gm IVPB in 100 ml NS ([facility])     Ordering Provider: Nanci Matute APRN    2 g  over 4 Hours Intravenous Every 12 Hours 10/08/20 0900 10/14/20 0859    10/07/20 1845  vancomycin IVPB 1750 mg in 0.9% Sodium Chloride (premix) 500 mL     Ordering Provider: Nanci Matute APRN    20 mg/kg × 81.6 kg  over 105 Minutes Intravenous Once 10/07/20 1900      10/07/20 1845  ceFEPime (MAXIPIME) in SWFI 2g/10ml IV PUSH syringe     Ordering Provider: Nanci Matute APRN    2 g  over 5 Minutes Intravenous Once 10/07/20 1900      10/07/20 1834  Pharmacy to Dose Cefepime     Ordering Provider: Nanci Matute APRN     Does not apply Continuous PRN 10/07/20 1834 10/14/20 1833    10/07/20 1834  Pharmacy to dose vancomycin     Ordering Provider: Nanci Matute APRN     Does not apply Continuous PRN 10/07/20 1834 10/14/20 1833          Reji Singh, PharmD  10/07/20 18:45 EDT

## 2020-10-07 NOTE — ED NOTES
Pt undressed and cleaned, placed in gown and covered with blanket. Pt belongings placed in belongings bag and kept at bedside. Assisted by [id number].     Manuelito Adam  10/07/20 1108

## 2020-10-07 NOTE — H&P
Pulmonary/ Critical Care/ sleep medicine ADMISSION H&P Note        Patient Name:  Primitivo Contreras    :  1935    Medical Record:  [id number]    Primary Care Physician     Hawa Estevez NP HOP[facility]  Primitivo Contreras is a 85 y.o. male who presented to the ED from [facility] as a rescuscitated cardiac arrest.  Information for this HPI was obtained from staff and chart review.  No family present at bedside.  The patient was found down by family outside.  He had been mowing the lawn.  It was reported they last saw him 15 minutes prior.  It is unknown if family started CPR.  EMS was called and he was found in V-FIB.  CPR was started and he received defibrillation.  Upon arrival to the ED he received additional defibrillation for V-fib/V-tach.  He received 3 mg of Epinephrine, 2 amps of sodium bicarbonate, and an amiodarone bolus.  He was hypotensive and started on Levophed.  He was flown to [facility] for additional care.  In the ED, the patient is intubated and non responsive.  Labs showed a lactate of 9.6, ionized calcium of 0.88, WBC 20.30, troponin of 0.718, CKMB 11.18, and hemoglobin of 11.2.  Sepsis protocol was initiated and antibiotics of Vancomycin and Cefepime ordered.  IV fluid bolus ordered of 30ml/kg.  Blood and urine cultures ordered.  Cardiology is consulted.  The patient will be started on the hypothermia protocol and admitted to the ICU for continued care.  I was told the patient has a prior medical history of HTN and CAD with a past stent.             Review of Systems    Unable to complete due to clinical status    Medical History    Past Medical History:   Diagnosis Date   • Coronary artery disease    • Hyperlipidemia    • Hypertension         Surgical History    Past Surgical History:   Procedure Laterality Date   • CARDIAC CATHETERIZATION     • CORONARY STENT PLACEMENT          Family History    No family history on file.    Social History    Social History     Tobacco Use   •  Smoking status: Former Smoker   • Smokeless tobacco: Never Used   • Tobacco comment: quit 30 yrs ago   Substance Use Topics   • Alcohol use: Yes     Comment: social/ occasional        Allergies    No Known Allergies    Medications    Scheduled Meds:[START ON 10/10/2020] !Vancomycin Level Draw Needed, , Does not apply, Once  [START ON 10/10/2020] !Vancomycin Level Draw Needed, , Does not apply, Once  artificial tears, , Both Eyes, Q2H  cefepime, 2 g, Intravenous, Once  [START ON 10/8/2020] cefepime, 2 g, Intravenous, Q12H  [START ON 10/8/2020] pantoprazole, 40 mg, Intravenous, Q AM  sodium chloride, 30 mL/kg, Intravenous, Once  sodium chloride, 10 mL, Intravenous, Q12H  [START ON 10/8/2020] vancomycin, 15 mg/kg, Intravenous, Q24H  vancomycin, 20 mg/kg, Intravenous, Once      Continuous Infusions:amiodarone, 1 mg/min    Followed by  amiodarone, 0.5 mg/min  fentanyl 10 mcg/mL,  mcg/hr  norepinephrine, 0.02-0.3 mcg/kg/min  Pharmacy to Dose Cefepime,   Pharmacy to dose vancomycin,   propofol, 5-50 mcg/kg/min      PRN Meds:.fentanyl 10 mcg/mL  •  norepinephrine  •  ondansetron  •  Pharmacy to Dose Cefepime  •  Pharmacy to dose vancomycin  •  potassium chloride  •  propofol  •  Insert peripheral IV **AND** sodium chloride  •  sodium chloride  •  vecuronium      Physical Exam    tMax 24 hrs:  No data recorded.    Vitals Ranges:  Heart Rate:  [86] 86  Resp:  [18] 18  BP: (164)/(56) 164/56  FiO2 (%):  [60 %-100 %] 60 %  Intake and Output Last 3 Shifts:  No intake/output data recorded.    Constitutional:  Well developed, well nourished, no acute distress, non-toxic appearance   Eyes:  PERRL, conjunctiva normal   HENT:  Atraumatic, external ears normal, nose normal, oropharynx moist, no pharyngeal exudates. Neck- normal range of motion, no tenderness, supple   Respiratory:  Clear to diminished breath sounds bilaterally, no rales, no wheezing   Cardiovascular:  Normal rate, normal rhythm, no murmurs, no gallops, no rubs      GI:  Soft, nondistended, normal bowel sounds, nontender, no organomegaly, no mass, no rebound, no guarding   :  No costovertebral angle tenderness   Musculoskeletal:  No edema, no tenderness, no deformities. Back- no tenderness  Integument:  Well hydrated, no rash   Lymphatic:  No lymphadenopathy noted   Neurologic:  Non-responsive   Psychiatric:  Unable to assess     labs    Lab Results (last 24 hours)     Procedure Component Value Units Date/Time    Extra Tubes [[id number]] Collected: 10/07/20 1732    Specimen: Blood, Venous Line Updated: 10/07/20 1845    Narrative:      The following orders were created for panel order Extra Tubes.  Procedure                               Abnormality         Status                     ---------                               -----------         ------                     Gold Top - SST[[id number]]                                   Final result                 Please view results for these tests on the individual orders.    Gold Top - SST [[id number]] Collected: 10/07/20 1732    Specimen: Blood Updated: 10/07/20 1845     Extra Tube Hold for add-ons.     Comment: Auto resulted.       Procalcitonin [[id number]] Collected: 10/07/20 1732    Specimen: Blood Updated: 10/07/20 1842    CK Total & CKMB [[id number]] Collected: 10/07/20 1732    Specimen: Blood Updated: 10/07/20 1842    BUN [[id number]]  (Normal) Collected: 10/07/20 1732    Specimen: Blood Updated: 10/07/20 1812     BUN 18 mg/dL     Troponin [[id number]]  (Abnormal) Collected: 10/07/20 1732    Specimen: Blood Updated: 10/07/20 1810     Troponin T 0.718 ng/mL     Narrative:      Troponin T Reference Range:  <= 0.03 ng/mL-   Negative for AMI  >0.03 ng/mL-     Abnormal for myocardial necrosis.  Clinicians would have to utilize clinical acumen, EKG, Troponin and serial changes to determine if it is an Acute Myocardial Infarction or myocardial injury due to an underlying chronic condition.       Results may be falsely decreased if  patient taking Biotin.      Scan Slide [[id number]] Collected: 10/07/20 1732    Specimen: Blood Updated: 10/07/20 1810     Scan Slide --     Comment: See Manual Differential Results       Manual Differential [[id number]]  (Abnormal) Collected: 10/07/20 1732    Specimen: Blood Updated: 10/07/20 1810     Neutrophil % 64.0 %      Lymphocyte % 10.0 %      Monocyte % 1.0 %      Bands %  25.0 %      Neutrophils Absolute 18.07 10*3/mm3      Lymphocytes Absolute 2.03 10*3/mm3      Monocytes Absolute 0.20 10*3/mm3      Anisocytosis Slight/1+     Poikilocytes Slight/1+     Toxic Granulation Slight/1+     Large Platelets Slight/1+    CBC & Differential [[id number]]  (Abnormal) Collected: 10/07/20 1732    Specimen: Blood Updated: 10/07/20 1810    Narrative:      The following orders were created for panel order CBC & Differential.  Procedure                               Abnormality         Status                     ---------                               -----------         ------                     CBC Auto Differential[[id number]]        Abnormal            Final result                 Please view results for these tests on the individual orders.    CBC Auto Differential [[id number]]  (Abnormal) Collected: 10/07/20 1732    Specimen: Blood Updated: 10/07/20 1810     WBC 20.30 10*3/mm3      RBC 3.21 10*6/mm3      Hemoglobin 11.2 g/dL      Hematocrit 33.7 %      .8 fL      MCH 34.8 pg      MCHC 33.2 g/dL      RDW 13.6 %      RDW-SD 49.4 fl      MPV 8.2 fL      Platelets 336 10*3/mm3     Basic Metabolic Panel [[id number]]  (Abnormal) Collected: 10/07/20 1732    Specimen: Blood Updated: 10/07/20 1807     Glucose 169 mg/dL      BUN --     Comment: Testing performed by alternate method        Creatinine 1.50 mg/dL      Sodium 144 mmol/L      Potassium 3.9 mmol/L      Chloride 106 mmol/L      CO2 15.0 mmol/L      Calcium 8.1 mg/dL      eGFR Non African Amer 45 mL/min/1.73      BUN/Creatinine Ratio --     Comment: Testing not  performed        Anion Gap 23.0 mmol/L     Narrative:      GFR Normal >60  Chronic Kidney Disease <60  Kidney Failure <15      POC Lactate [[id number]]  (Abnormal) Collected: 10/07/20 1723    Specimen: Blood Updated: 10/07/20 1734     Lactate 9.6 mmol/L      Comment: Serial Number: [id number]:  [id number]       Lactic Acid, Reflex Timer (This will reflex a repeat order 3-3:15 hours after ordered.) [[id number]] Collected: 10/07/20 1723    Specimen: Blood Updated: 10/07/20 1734    POCT Electrolytes +HGB +HCT [[id number]]  (Abnormal) Collected: 10/07/20 1723    Specimen: Blood Updated: 10/07/20 1732     Sodium 144 mmol/L      POC Potassium 3.8 mmol/L      Ionized Calcium 0.88 mmol/L      Comment: Serial Number: [id number]:  [id number]        Glucose 159 mg/dL      Hematocrit 32 %      Hemoglobin 10.8 g/dL     Blood Gas, Arterial [[id number]]  (Abnormal) Collected: 10/07/20 1723    Specimen: Arterial Blood Updated: 10/07/20 1730     Site Right Radial     Gary's Test Positive     pH, Arterial 7.453 pH units      pCO2, Arterial 25.3 mm Hg      pO2, Arterial 478.4 mm Hg      HCO3, Arterial 17.7 mmol/L      Base Excess, Arterial -5.0 mmol/L      Comment: Serial Number: [id number]:  [id number]        O2 Saturation, Arterial 100.0 %      CO2 Content 18.5 mmol/L      Barometric Pressure for Blood Gas --     Comment: N/A        Modality Adult Vent     FIO2 100 %      Ventilator Mode ;AC     Set Tidal Volume 700     PEEP 5     Hemodilution No     Respiratory Rate 18    POC Glucose Once [[id number]]  (Abnormal) Collected: 10/07/20 1723    Specimen: Blood Updated: 10/07/20 1730     Glucose 159 mg/dL      Comment: Serial Number: [id number]:  [id number]             Imaging & Other Studies    Imaging Results (Last 72 Hours)     Procedure Component Value Units Date/Time    CT Head Without Contrast [[id number]] Collected: 10/07/20 1849     Updated: 10/07/20 1853    Narrative:         DATE OF EXAM:  10/7/2020 6:37 PM     PROCEDURE:   CT HEAD WO  CONTRAST-     INDICATIONS:   Mental status change, unknown cause; I46.9-Cardiac arrest, cause  unspecified; I49.01-Ventricular fibrillation     COMPARISON:  No Comparisons Available     TECHNIQUE:   Routine transaxial cuts were obtained through the head without the  administration of contrast. Automated exposure control and iterative  reconstruction methods were used.      FINDINGS:  There are paranasal sinus changes that could relate to intubation. There  is some suggested cerebral atrophy. There is no underlying hemorrhage.  There is no midline shift. There are no abnormal extra-axial fluid  collections. There is atherosclerotic changes involving the vertebral  arteries and carotid siphon.        Impression:      1.No acute intracranial abnormality.  2.There is some cerebral atrophy.  3.Atherosclerotic changes are present.  4.There is an appearance to the paranasal sinuses which could relate to  intubation.     At some point MR may be of benefit in further workup of this patient as  thought clinically indicated.     Electronically Signed By-Eduardo Pratt On:10/7/2020 6:51 PM  This report was finalized on [id number] by  Eduardo Pratt, .    XR Chest 1 View [[id number]] Collected: 10/07/20 1848     Updated: 10/07/20 1851    Narrative:      DATE OF EXAM:  10/7/2020 6:37 PM     PROCEDURE:  XR CHEST 1 VW-     INDICATIONS:  Cardiac arrest; I46.9-Cardiac arrest, cause unspecified;  I49.01-Ventricular fibrillation     COMPARISON:  No Comparisons Available     TECHNIQUE:   Single radiographic AP view of the chest was obtained.     FINDINGS:  Extraneous objects are seen overlying the chest obscuring the lungs to  some degree. The heart looks enlarged. There is an ET tube is suggested  with its tip just above the ting. This could be pulled back 1.5 cm for  more optimal positioning. The visualized lungs look relatively clear.  There are no pleural effusions.        Impression:      1.No definite acute pulmonary  process.  2.Cardiomegaly  3.ET tube could be pulled back slightly for more optimal positioning.     Electronically Signed By-Eduardo Pratt On:10/7/2020 6:49 PM  This report was finalized on [id number] by  Eduardo Pratt, .          Assessment    Cardiac arrest (CMS/HCC)  -consult cardiology   -monitor troponin  -obtain ECHO   -CT head negative   -TTM therapy ordered, follow protocol  -replace electrolytes as needed    Unresponsive  -CT head without acute findings  -TTM therapy ordered  -EEG ordered    Acute respiratory failure (CMS/[facility])   -Intubated prior to arrival  -ABG and vent reviewed, adjust as appropriate   -obtain sputum cx   -check COVID-19 swab     Septic shock (CMS/[facility])  -blood cultures ordered  -UA ordered  -lactate 9.6, admin fluid bolus.  Repeat Q 4 hours   -check sputum cx     VALERIE (acute kidney injury) (CMS/[facility])  -creatinine 1.50, monitor     Elevated troponin  -consult cardiology, Dr. Rapp  -ECHO ordered  -monitor Q 6 hours     Leukocytosis  -WBC 20.30  -abx as above   -follow cx     Hypocalcemia  -replace with 2 grams    Anemia  -hgb 11.2, monitor     Mixed hyperlipidemia  -resume home med when appropriate     HTN (hypertension)  -resume home med when appropriate     CAD (coronary artery disease)  -resume home med when appropriate     Gastroesophageal reflux disease  -protonix           DVT ppy:  -SCDs    PPI ppy:  -protonix     Full Code  Patient seen and examined with Dr. Garcia

## 2020-10-07 NOTE — ED PROVIDER NOTES
Subjective   Patient is an 85-year-old male who apparently was mowing the lawn and family found him unconscious unresponsive after up to 15 minutes of last seeing him.  Upon EMS arrival CPR was initiated.  The patient was in V. fib and defibrillated.  They were brought to an outlying emergency department.  In the emergency department the patient had another episode of V. fib and V. tach.  Patient received a total of 3 mg of epinephrine IV as well as sodium bicarb x2 A.  Patient was defibrillated back to normal sinus rhythm.  Patient was given an amiodarone bolus and started on Levophed and transferred here for admission.  Upon arrival here no other information is available.  The time from when the patient was found down to now was approximate 3 hours in length.  Patient apparently had CPR for approximately 15 minutes at the beginning of this event although that time is a little blurry at this point.          Review of Systems  Unable to be obtained due to the patient's condition  Past Medical History:   Diagnosis Date   • Coronary artery disease    • Hyperlipidemia    • Hypertension        No Known Allergies    Past Surgical History:   Procedure Laterality Date   • CARDIAC CATHETERIZATION     • CORONARY STENT PLACEMENT         No family history on file.    Social History     Socioeconomic History   • Marital status:      Spouse name: Not on file   • Number of children: Not on file   • Years of education: Not on file   • Highest education level: Not on file   Tobacco Use   • Smoking status: Former Smoker   • Smokeless tobacco: Never Used   • Tobacco comment: quit 30 yrs ago   Substance and Sexual Activity   • Alcohol use: Yes     Comment: social/ occasional   • Drug use: No   • Sexual activity: Defer           Objective   Physical Exam  Neurologic exam shows the patient to be unresponsive.  There is no focal deficits.  Lungs have symmetrical breath sounds on relation.  Heart has regular rate rhythm without  murmur.  Abdomen is soft.  Extremities M is no cyanosis or edema.  Procedures     My EKG interpretation shows normal sinus rhythm with no acute ST change      ED Course      Results for orders placed or performed during the hospital encounter of 10/07/20   Blood Gas, Arterial    Specimen: Arterial Blood   Result Value Ref Range    Site Right Radial     Gary's Test Positive     pH, Arterial 7.453 (H) 7.350 - 7.450 pH units    pCO2, Arterial 25.3 (L) 35.0 - 48.0 mm Hg    pO2, Arterial 478.4 (H) 83.0 - 108.0 mm Hg    HCO3, Arterial 17.7 (L) 21.0 - 28.0 mmol/L    Base Excess, Arterial -5.0 (L) 0.0 - 3.0 mmol/L    O2 Saturation, Arterial 100.0 (H) 94.0 - 98.0 %    CO2 Content 18.5 (L) 22 - 29 mmol/L    Barometric Pressure for Blood Gas      Modality Adult Vent     FIO2 100 %    Ventilator Mode ;AC     Set Tidal Volume 700     PEEP 5     Hemodilution No     Respiratory Rate 18    POCT Electrolytes +HGB +HCT    Specimen: Blood   Result Value Ref Range    Sodium 144 138 - 146 mmol/L    POC Potassium 3.8 3.5 - 4.5 mmol/L    Ionized Calcium 0.88 (C) 1.15 - 1.33 mmol/L    Glucose 159 (H) 74 - 100 mg/dL    Hematocrit 32 (L) 38 - 51 %    Hemoglobin 10.8 (L) 12.0 - 17.0 g/dL   POC Lactate    Specimen: Blood   Result Value Ref Range    Lactate 9.6 (C) 0.5 - 2.0 mmol/L   POC Glucose Once    Specimen: Blood   Result Value Ref Range    Glucose 159 (H) 74 - 100 mg/dL                                          MDM  Number of Diagnoses or Management Options  Diagnosis management comments: Patient will have a CT scan of his head without contrast to rule out intracranial abnormality.  Patient will be then taken to the intensive care unit for further resuscitation.  I did speak to the on-call intensivist.       Amount and/or Complexity of Data Reviewed  Clinical lab tests: reviewed    Risk of Complications, Morbidity, and/or Mortality  Presenting problems: high  Diagnostic procedures: high  Management options: high    Critical Care  Total  time providing critical care: 30-74 minutes      Final diagnoses:   Cardiac arrest (CMS/HCC)   Ventricular fibrillation (CMS/HCC)            Laureano Velazquez MD  10/07/20 2716

## 2020-10-08 NOTE — SIGNIFICANT NOTE
Routine order received via hypothermia protocol.  Pt intubated. Will sign off at this time, please reconsult if ST services warranted post extubation.

## 2020-10-08 NOTE — CONSULTS
"Nutrition Services    Patient Name:  Primitivo Contreras  YOB: 1935  MRN: [id number]  Admit Date:  10/7/2020    Comments:     Estimated needs for EN support & will monitor for timing of initiation    *This assessment completed remotely with assistance from nursing communication and EMR documentation      Clinical Nutrition Assessment       Reason for Assessment 10/8/2020: Tube feed consult   H&P:  Past Medical History:   Diagnosis Date   • Coronary artery disease    • Hyperlipidemia    • Hypertension      Past Surgical History:   Procedure Laterality Date   • CARDIAC CATHETERIZATION     • CORONARY STENT PLACEMENT        Current Problems:   Cardiac arrest   -etiology likely cardiac event  -cardiology consulted  -TTM therapy ordered     Unresponsive  -concern of anoxic brain injury  -CT head without acute findings  -TTM therapy ordered  -EEG ordered     Acute respiratory failure   -Intubated prior to arrival on 10/7  -COVID-19 not detected     Septic shock  -Hypotension, on 1 pressor     VALERIE (acute kidney injury)   -on HCO3 gtt   -renal consulted.  High risk for further progression and may need hemodialysis     Elevated troponin     Leukocytosis  -No obvious evidence of aspiration pneumonia     Hypocalcemia  -replaced     Anemia       Nutrition/Diet History         Narrative     Pt is currently intubated & sedating. Undergoing hypothermia protocol, and enteral feeds will be delayed until pt is rewarmed. RDN assessing and will check on AM     Functional Status Pt reported to be ambulatory PTA (was able to mow his lawn)   Food Allergies NKFA   Factors Affecting   Nutritional Intake Compromised airway; TTM     Anthropometrics         Height 182.9 cm (72\")    Weight 81.6 kg (179 lb)    10/07/20 - no wt from today        Admit Weight 81.6 kg (179 lb)    10/07/20       Ideal Body Weight (IBW) 178 lb   % Ideal Body Weight 101%       Usual Body Weight GARY   % Usual Body Weight GARY   Weight Hx  8/3/2020 180 lb " "  2/3/2020 186 lb   8/5/2019 185 lb            BMI (kg/m2) Body mass index is 24.4 kg/m².     Labs/Medications         Pertinent Labs    Results from last 7 days   Lab Units 10/08/20  0541 10/08/20  0034 10/07/20  1931   SODIUM mmol/L 142 142 143   POTASSIUM mmol/L 3.7 2.8* 3.4*   CHLORIDE mmol/L 113* 108* 108*   CO2 mmol/L 14.0* 13.0* 16.0*   BUN  25* 23 21   CREATININE mg/dL 2.15* 1.98* 1.77*   CALCIUM mg/dL 7.8* 8.4* 8.1*   BILIRUBIN mg/dL 0.4 0.7 0.8   ALK PHOS U/L 53 63 80   ALT (SGPT) U/L 59* 62* 66*   AST (SGOT) U/L 119* 124* 110*   GLUCOSE mg/dL 187* 280* 178*     Results from last 7 days   Lab Units 10/08/20  0541 10/08/20  0034 10/07/20  1931   MAGNESIUM mg/dL 3.1* 1.7 2.0   PHOSPHORUS mg/dL 2.2* 3.0 2.6   HEMOGLOBIN g/dL 10.2* 10.9*  --    HEMATOCRIT % 30.4* 32.6*  --        Pertinent Medications Cefepime, cerebyx, protonix, vanc, fentanyl, insulin drip, levophed, propofol (12.24 mL/hr = ~323 kcals/day), Na+ bicarb in D5W, vaso, magnesium sulfate, KCL     Physical Findings        Overall Physical   Appearance, MSA Unable to observe r/t limiting face-to-face contact in the context of the COVID19 pandemic     Edema  None documented     Gastrointestinal BM documented on 10/7   Tubes NGT     Oral/Mouth Cavity FMS ->1000 mL o/p documented 10/7   Skin Intact       Estimated/Assessed Needs       Energy Requirements    Height for Calculation  182.9 cm (72\")   Weight for Calculation 81.6 kg (179 lb)   Method for Estimation  25 kcals/kg    *Would not use [facility] given hypothermia protocol   EST Needs (kcal/day) 2040 kcals/day       Protein Requirements    Weight for Calculation 81.6 kg (179 lb)   EST Protein Needs (g/kg) 1.2 gms/kg   EST Daily Needs (g/day) 98 gms/day       Fluid Requirements     Estimated Needs (mL/day) 2040 mL->1 mL/kcal    Adjust based on hydration       Fluid Deficit    Current Na Level (mEq/L) -   Desired Na Level (mEq/L) -   Estimated Fluid Deficit (L)  -     Current Nutrition Orders & " Evaluation of Received Nutrient/Fluid Intake       Oral Nutrition     Current PO Diet NPO   Supplement -   PO Evaluation     % PO Intake 0% - NPO       Enteral Nutrition    Enteral Route NGT   TF Modular -   TF Delivery Method -   Current Ordered TF  -   Current Ordered H2O flush  -    TF Observation  -   EN Evaluation     TF Changes -    TF Residual -    TF Tolerance -    Average EN Delivered -       Parenteral Nutrition     TPN Route -   Current Ordered TPN VOL  -   Dextrose (g/kcals)  -   Amino Acid (g/kcals) -   Lipids (mL/Concentration/FREQ)  -   MVI Frequency  -   Trace Element Frequency  -   TPN Observation  -    TPN Evaluation    Total Number Days on TPN -       Clinical Course    Nutrition Course Details PO Diet:    10/7 to present (10/8)    Nutrition Support:  TF have not been started     Nutritional Risk Screening        NRS-2002 Score   -       Nutrition Diagnosis         Nutrition Dx Problem 1 Predicted inadequate energy intake r/t intubation AEB NPO due to mechanical ventilation    Nutrition Dx Problem 2 -       Intervention Goal         Intervention Goal(s) Timing of intiation of EN support     Nutrition Intervention        RD/Tech Action Estimated needs for EN support & will monitor for timing of initiation     Nutrition Prescription          Diet Prescription NPO   Supplement Presciption -       Enteral Prescription -       TPN Prescription -     Monitor/Evaluation        Monitor Timing of EN support, tolerance with residuals, N/V, abd pain/distention, BMs, labs (electrolytes, BUN/Crt, glucose), wt for changes, skin integrity, at next encounter.         Electronically signed by:  Zenobia Pretty RD  10/08/20 10:06 EDT

## 2020-10-08 NOTE — PROCEDURES
"Insert Arterial Line at Bedside    Date/Time: 10/7/2020 9:30 PM  Performed by: Tian Arias APRN  Authorized by: Nanci Matute APRN   Consent: The procedure was performed in an emergent situation. Verbal consent obtained.  Risks and benefits: risks, benefits and alternatives were discussed (Previously discussed with patient's next of kin.)  Consent given by: Next of kin.  Patient understanding: patient states understanding of the procedure being performed (Next of kin)  Patient consent: the patient's understanding of the procedure matches consent given  Procedure consent: procedure consent matches procedure scheduled  Relevant documents: relevant documents present and verified  Test results: test results available and properly labeled  Site marked: the operative site was marked  Imaging studies: imaging studies available  Required items: required blood products, implants, devices, and special equipment available  Patient identity confirmed: arm band, anonymous protocol, patient vented/unresponsive and hospital-assigned identification number  Time out: Immediately prior to procedure a \"time out\" was called to verify the correct patient, procedure, equipment, support staff and site/side marked as required.  Preparation: Patient was prepped and draped in the usual sterile fashion.  Indications: multiple ABGs, respiratory failure and hemodynamic monitoring  Location: left radial  Anesthesia: local infiltration    Anesthesia:  Local Anesthetic: lidocaine 2% without epinephrine  Anesthetic total: 3 mL    Sedation:  Patient sedated: no    Gary's test normal: yes  Needle gauge: 20  Seldinger technique: Seldinger technique used  Number of attempts: 1  Post-procedure: line sutured and dressing applied  Post-procedure CMS: unchanged  Patient tolerance: patient tolerated the procedure well with no immediate complications  Comments: Arterial waveform visualized on monitor with appropriate dicrotic notch.        Tian" ALEX Arias, HUGO  [facility]  10/07/2020

## 2020-10-08 NOTE — PLAN OF CARE
Pt on hypothermia protocol at this time.  PT will complete order and await further orders if appropriate.  PT did not enter room.    Judy Marsh PT, DPT, GCS

## 2020-10-08 NOTE — CONSULTS
INITIAL CONSULT NOTE      Patient Name: Primitivo Contreras  : 1935  MRN: [id number]  Primary Care Physician: Hawa Estevez NP  Date of admission: 10/7/2020    Patient Care Team:  Hawa Estevez NP as PCP - General (Nurse Practitioner)        Reason for Consult:       Renal failure  Subjective   History of Present Illness:   Chief Complaint:   Chief Complaint   Patient presents with   • Cardiac Arrest     HISTORY:  Primitivo Contreras is a 85 y.o. male with past medical history of coronary artery disease, hypertension,. who presents with cardiac arrest.  Patient was mowing lawn at  his home and he fell down and found to have cardiac arrest EMS was called patient was resuscitated but the resuscitation offer was at least half an hour to 45 minutes.  Patient came to the hospital sometime yesterday urine output only 50 cc all night.  Hemodynamically patient blood pressures running low.  On multiple pressors at this time.  Now getting more acidotic.  Still on sedation unable to get neurological response.  But pupils are somewhat constricted.  Not moving any extremities.  Patient potassium stable but bicarb level is only 14.  Other electrolytes are acceptable.  Hemoglobin last was 10.2.  Still has significantly high lactic acid level          Review of systems:  Unable to obtain  Patient is intubated and sedated    Personal History:     Past Medical History:   Past Medical History:   Diagnosis Date   • Coronary artery disease    • Hyperlipidemia    • Hypertension        Surgical History:      Past Surgical History:   Procedure Laterality Date   • CARDIAC CATHETERIZATION     • CORONARY STENT PLACEMENT         Family History: family history is not on file. Otherwise pertinent FHx was reviewed and unremarkable.     Social History:  reports that he has quit smoking. He has never used smokeless tobacco. He reports current alcohol use. He reports that he does not use drugs.    Medications:  Prior to Admission  medications    Medication Sig Start Date End Date Taking? Authorizing Provider   aspirin (ADULT ASPIRIN EC LOW STRENGTH) 81 MG EC tablet Take 81 mg by mouth Daily. 9/11/15   Price Vidal MD   atorvastatin (LIPITOR) 40 MG tablet Take 40 mg by mouth Daily. 6/9/19   Price Vidal MD   clopidogrel (PLAVIX) 75 MG tablet Take 75 mg by mouth Daily. 6/20/19   Price Vidal MD   dilTIAZem CD (CARDIZEM CD) 120 MG 24 hr capsule Take 1 capsule by mouth 2 (Two) Times a Day. 11/1/19   Jose Lema MD   hydrALAZINE (APRESOLINE) 100 MG tablet TAKE 1 TABLET BY MOUTH EVERY 12 HOURS 6/9/20   Jose Lema MD   hydroCHLOROthiazide (MICROZIDE) 12.5 MG capsule TAKE 1 CAPSULE BY MOUTH EVERY DAY 7/22/20   Jose Lema MD   HYDROcodone-acetaminophen (NORCO)  MG per tablet Take 1 tablet by mouth Every 6 (Six) Hours As Needed. 7/3/19   Price Vidal MD   omeprazole (priLOSEC) 40 MG capsule Take 40 mg by mouth Daily. 7/22/19   Price Vidal MD   ramipril (ALTACE) 10 MG capsule Take 10 mg by mouth 2 (two) times a day. 8/1/19   Price Vidal MD     Scheduled Meds:[START ON 10/10/2020] !Vancomycin Level Draw Needed, , Does not apply, Once  [START ON 10/10/2020] !Vancomycin Level Draw Needed, , Does not apply, Once  artificial tears, , Both Eyes, Q2H  cefepime, 2 g, Intravenous, Q12H  fosphenytoin, 200 mg PE, Intravenous, Q12H  pantoprazole, 40 mg, Intravenous, Q AM  sodium chloride, 30 mL/kg, Intravenous, Once  sodium chloride, 10 mL, Intravenous, Q12H  vancomycin, 15 mg/kg, Intravenous, Q24H      Continuous Infusions:fentanyl 10 mcg/mL,  mcg/hr, Last Rate: 50 mcg/hr (10/08/20 0034)  insulin, 1-20 Units/hr, Last Rate: 5 Units/hr (10/08/20 0700)  norepinephrine, 0.02-0.5 mcg/kg/min, Last Rate: 0.17 mcg/kg/min (10/08/20 0900)  Pharmacy to Dose Cefepime,   Pharmacy to dose vancomycin,   propofol, 5-50 mcg/kg/min, Last Rate: 25 mcg/kg/min (10/08/20 0609)  sodium bicarbonate drip less than  75 mEq/bag, 75 mEq, Last Rate: 75 mEq (10/08/20 0736)  vasopressin, 0.03 Units/min, Last Rate: 0.03 Units/min (10/08/20 0607)      PRN Meds:dextrose  •  fentanyl 10 mcg/mL  •  magnesium sulfate  •  norepinephrine  •  ondansetron  •  Pharmacy to Dose Cefepime  •  Pharmacy to dose vancomycin  •  potassium chloride  •  propofol  •  [COMPLETED] Insert peripheral IV **AND** sodium chloride  •  sodium chloride  •  vecuronium  Allergies:  No Known Allergies    Objective   Exam:     Vital Signs  Temp:  [91.9 °F (33.3 °C)-97.5 °F (36.4 °C)] 91.9 °F (33.3 °C)  Heart Rate:  [41-92] 45  Resp:  [18] 18  BP: (164-210)/(35-66) 177/57  Arterial Line BP: ()/(39-63) 126/48  FiO2 (%):  [40 %-100 %] 40 %  SpO2:  [99 %-100 %] 100 %  on  Flow (L/min):  [60] 60;   Device (Oxygen Therapy): ventilator  Body mass index is 24.4 kg/m².  EXAM  General: Elderly  male intubated  Head:      Normocephalic and atraumatic.  Intubated not responsive  Eyes:      PERRL/EOM intact, conjunctiva and sclera clear .    Neck:      No masses, thyromegaly,  trachea central   Lungs:    Mild crackles bilaterally to auscultation.    Heart:      Regular rate and rhythm, no murmur no gallop  Abd:        Soft, nontender, not distended, bowel sounds positive, no shifting dullness.  Msk:        No deformity or scoliosis noted of thoracic or lumbar spine.    Pulses:   Pulses normal in all 4 extremities.    Extr:        No cyanosis or clubbing--no significant edema.    Neuro:    Sedated patient is intubated at this time  Skin:       Intact without lesions or rashes.    Psych:    Not applicable       Results Review:  I have personally reviewed most recent Data :  BMP @LABRCNT(creatinine:10)  CBC    Results from last 7 days   Lab Units 10/08/20  0541 10/08/20  0034 10/07/20  1732 10/07/20  1723   WBC 10*3/mm3 12.90* 23.00* 20.30*  --    HEMOGLOBIN g/dL 10.2* 10.9* 11.2*  --    HEMOGLOBIN, POC g/dL  --   --   --  10.8*   PLATELETS 10*3/mm3 239 311 336  --      CMP      Results from last 7 days   Lab Units 10/08/20  0541 10/08/20  0034 10/07/20  1931 10/07/20  1732   SODIUM mmol/L 142 142 143 144   POTASSIUM mmol/L 3.7 2.8* 3.4* 3.9   CHLORIDE mmol/L 113* 108* 108* 106   CO2 mmol/L 14.0* 13.0* 16.0* 15.0*   BUN  25* 23 21 18   CREATININE mg/dL 2.15* 1.98* 1.77* 1.50*   GLUCOSE mg/dL 187* 280* 178* 169*   ALBUMIN g/dL 3.00* 3.40* 3.80  --    BILIRUBIN mg/dL 0.4 0.7 0.8  --    ALK PHOS U/L 53 63 80  --    AST (SGOT) U/L 119* 124* 110*  --    ALT (SGPT) U/L 59* 62* 66*  --      ABG    Results from last 7 days   Lab Units 10/08/20  0317 10/07/20  1723   PH, ARTERIAL pH units 7.345* 7.453*   PCO2, ARTERIAL mm Hg 28.2* 25.3*   PO2 ART mm Hg 372.3* 478.4*   O2 SATURATION ART % 100.0* 100.0*   BASE EXCESS ART mmol/L -9.1* -5.0*     Ct Head Without Contrast    Result Date: 10/7/2020  1.No acute intracranial abnormality. 2.There is some cerebral atrophy. 3.Atherosclerotic changes are present. 4.There is an appearance to the paranasal sinuses which could relate to intubation.  At some point MR may be of benefit in further workup of this patient as thought clinically indicated.  Electronically Signed By-Eduardo Pratt On:10/7/2020 6:51 PM This report was finalized on [id number] by  Eduardo Pratt, .    Xr Chest 1 View    Result Date: 10/8/2020  1. Placement of an esophagogastric tube with the tip below the diaphragm. 2. Stable ET tube and left IJ central venous catheter. 3. Cardiomegaly. 4. Clear lungs.  Electronically Signed By-Tom Paiz On:10/8/2020 8:26 AM This report was finalized on [id number] by  Rebecca Masters    Xr Chest 1 View    Result Date: 10/7/2020  1.Cardiomegaly 2.No definite infiltrates. 3.New left IJ line is noted. 4.There is an ET tube present. The tip is just above the ting. It may be pulled back 2.5 cm for more optimal positioning.  Electronically Signed By-Eduardo Pratt On:10/7/2020 9:55 PM This report was finalized on [id number] by  Eduardo Pratt, .    Xr Chest 1  View    Result Date: 10/7/2020  1.No definite acute pulmonary process. 2.Cardiomegaly 3.ET tube could be pulled back slightly for more optimal positioning.  Electronically Signed By-Eduardo Pratt On:10/7/2020 6:49 PM This report was finalized on [id number] by  Eduardo Pratt, .    Xr Abdomen Kub    Result Date: 10/7/2020  1.Nasogastric tube tip is within the stomach.  Electronically Signed By-Eduardo Pratt On:10/7/2020 9:55 PM This report was finalized on [id number] by  Eduardo Pratt, .             Assessment/Plan   Assessment and Plan:         Mixed hyperlipidemia    HTN (hypertension)    CAD (coronary artery disease)    Gastroesophageal reflux disease    Cardiac arrest (CMS/HCC)    Acute respiratory failure (CMS/HCC)    VAELRIE (acute kidney injury) (CMS/HCC)    Elevated troponin    Leukocytosis    Septic shock (CMS/HCC)    Hypocalcemia    Anemia    Unresponsive    ASSESSMENT:  · Acute kidney injury hemodynamically mediated s/p cardiac arrest patient likely has ATN by this time as no significant urine output more than 12 hours although hematuria dynamics somewhat better after starting pressors.  · Significant metabolic acidosis with increased anion gap  · Lactic acidosis  · S/p cardiac arrest  · On hypothermic protocol  · Hypermagnesemia  · Slightly elevated CPK        Plan:     • At this time Shiley catheter will be placed as patient is not making urine and likely has dense ATN  · Depending upon patient electrolytes and metabolic acidosis renal replacement therapy will be started later today or tomorrow morning  · Further escalation of treatment depend upon patient extent of anoxic brain damage  · May need a neurology evaluation  · Will get the urine studies  · Follow-up with volume status closely  · May need a cardiology evaluation  · Thank you for letting me participate    Note started  by Tony Santana MD,   [facility] kidney consultant  [phone]

## 2020-10-08 NOTE — PROGRESS NOTES
Pulmonary/ Critical Care/ sleep medicine PROGRESS Note        Patient Name:  Primitivo Contreras    :  1935    Medical Record:  [id number]    Primary Care Physician     Hawa Estevez, OPHELIA YOUNGI  Primitivo Contreras is a 85 y.o. male who presented to the ED from [facility] as a rescuscitated cardiac arrest.  Information for this HPI was obtained from staff and chart review.  No family present at bedside.  The patient was found down by family outside.  He had been mowing the lawn.  It was reported they last saw him 15 minutes prior.  It is unknown if family started CPR.  EMS was called and he was found in V-FIB.  CPR was started and he received defibrillation.  Upon arrival to the ED he received additional defibrillation for V-fib/V-tach.  He received 3 mg of Epinephrine, 2 amps of sodium bicarbonate, and an amiodarone bolus.  He was hypotensive and started on Levophed.  He was flown to [facility] for additional care.  In the ED, the patient is intubated and non responsive.  Labs showed a lactate of 9.6, ionized calcium of 0.88, WBC 20.30, troponin of 0.718, CKMB 11.18, and hemoglobin of 11.2.  Sepsis protocol was initiated and antibiotics of Vancomycin and Cefepime ordered.  IV fluid bolus ordered of 30ml/kg.  Blood and urine cultures ordered.  Cardiology is consulted.  The patient will be started on the hypothermia protocol and admitted to the ICU for continued care.  I was told the patient has a prior medical history of HTN and CAD with a past stent.       10/8/20:  Intubated, sedated, and on vasopressor support.  Hypothermia protocol.  Decreased urine output.  Overnight issues with hypertension and hypotension.              Review of Systems    Unable to complete due to clinical status      Medical History    Past Medical History:   Diagnosis Date   • Coronary artery disease    • Hyperlipidemia    • Hypertension         Surgical History    Past Surgical History:   Procedure Laterality Date   •  CARDIAC CATHETERIZATION     • CORONARY STENT PLACEMENT          Family History    No family history on file.    Social History    Social History     Tobacco Use   • Smoking status: Former Smoker   • Smokeless tobacco: Never Used   • Tobacco comment: quit 30 yrs ago   Substance Use Topics   • Alcohol use: Yes     Comment: social/ occasional        Allergies    No Known Allergies    Medications    Scheduled Meds:[START ON 10/10/2020] !Vancomycin Level Draw Needed, , Does not apply, Once  [START ON 10/10/2020] !Vancomycin Level Draw Needed, , Does not apply, Once  artificial tears, , Both Eyes, Q2H  cefepime, 2 g, Intravenous, Q12H  fosphenytoin, 200 mg PE, Intravenous, Q12H  pantoprazole, 40 mg, Intravenous, Q AM  sodium chloride, 30 mL/kg, Intravenous, Once  sodium chloride, 10 mL, Intravenous, Q12H  vancomycin, 15 mg/kg, Intravenous, Q24H      Continuous Infusions:fentanyl 10 mcg/mL,  mcg/hr, Last Rate: 50 mcg/hr (10/08/20 0034)  insulin, 1-20 Units/hr, Last Rate: 5 Units/hr (10/08/20 07)  norepinephrine, 0.02-0.5 mcg/kg/min, Last Rate: 0.17 mcg/kg/min (10/08/20 0900)  Pharmacy to Dose Cefepime,   Pharmacy to dose vancomycin,   propofol, 5-50 mcg/kg/min, Last Rate: 25 mcg/kg/min (10/08/20 06)  sodium bicarbonate drip less than 75 mEq/bag, 75 mEq, Last Rate: 75 mEq (10/08/20 0736)  vasopressin, 0.03 Units/min, Last Rate: 0.03 Units/min (10/08/20 0607)      PRN Meds:.dextrose  •  fentanyl 10 mcg/mL  •  magnesium sulfate  •  norepinephrine  •  ondansetron  •  Pharmacy to Dose Cefepime  •  Pharmacy to dose vancomycin  •  potassium chloride  •  propofol  •  [COMPLETED] Insert peripheral IV **AND** sodium chloride  •  sodium chloride  •  vecuronium      Physical Exam    tMax 24 hrs:  Temp (24hrs), Av.4 °F (34.7 °C), Min:91.9 °F (33.3 °C), Max:97.5 °F (36.4 °C)    Vitals Ranges:  Temp:  [91.9 °F (33.3 °C)-97.5 °F (36.4 °C)] 91.9 °F (33.3 °C)  Heart Rate:  [41-92] 47  Resp:  [18] 18  BP: (164-210)/(35-66)  177/57  Arterial Line BP: ()/(39-63) 133/51  FiO2 (%):  [40 %-100 %] 40 %  Intake and Output Last 3 Shifts:  I/O last 3 completed shifts:  In: 2288 [I.V.:2288]  Out: 2095 [Urine:95; Emesis/NG output:1000; Stool:1000]    Constitutional:  Well developed, well nourished, no acute distress, non-toxic appearance   Eyes:  PERRL, conjunctiva normal   HENT:  Atraumatic, external ears normal, nose normal, oropharynx moist, no pharyngeal exudates. Neck- normal range of motion, no tenderness, supple   Respiratory:  Clear to diminished breath sounds bilaterally, no rales, no wheezing   Cardiovascular:  Normal rate, normal rhythm, no murmurs, no gallops, no rubs   GI:  Soft, nondistended, normal bowel sounds, nontender, no organomegaly, no mass, no rebound, no guarding   :  No costovertebral angle tenderness   Musculoskeletal:  No edema, no tenderness, no deformities. Back- no tenderness  Integument:  Well hydrated, no rash   Lymphatic:  No lymphadenopathy noted   Neurologic:  Intubated and sedated  Psychiatric:  Unable to assess     labs    Lab Results (last 24 hours)     Procedure Component Value Units Date/Time    POC Glucose Once [[id number]]  (Abnormal) Collected: 10/08/20 1113    Specimen: Blood Updated: 10/08/20 1115     Glucose 151 mg/dL      Comment: Serial Number: [id number]:  [id number]       POC Glucose Once [[id number]]  (Abnormal) Collected: 10/08/20 0741    Specimen: Blood Updated: 10/08/20 0743     Glucose 129 mg/dL      Comment: Serial Number: [id number]:  [id number]       CBC & Differential [[id number]]  (Abnormal) Collected: 10/08/20 0541    Specimen: Blood Updated: 10/08/20 0704    Narrative:      The following orders were created for panel order CBC & Differential.  Procedure                               Abnormality         Status                     ---------                               -----------         ------                     CBC Auto Differential[[id number]]        Abnormal             Final result                 Please view results for these tests on the individual orders.    CBC Auto Differential [[id number]]  (Abnormal) Collected: 10/08/20 0541    Specimen: Blood Updated: 10/08/20 0704     WBC 12.90 10*3/mm3      RBC 2.91 10*6/mm3      Hemoglobin 10.2 g/dL      Hematocrit 30.4 %      .5 fL      MCH 34.9 pg      MCHC 33.4 g/dL      RDW 13.6 %      RDW-SD 50.3 fl      MPV 8.0 fL      Platelets 239 10*3/mm3     Scan Slide [[id number]] Collected: 10/08/20 0541    Specimen: Blood Updated: 10/08/20 0704     Scan Slide --     Comment: See Manual Differential Results       Manual Differential [[id number]]  (Abnormal) Collected: 10/08/20 0541    Specimen: Blood Updated: 10/08/20 0704     Neutrophil % 46.0 %      Lymphocyte % 7.0 %      Monocyte % 6.0 %      Bands %  37.0 %      Metamyelocyte % 4.0 %      Neutrophils Absolute 10.71 10*3/mm3      Lymphocytes Absolute 0.90 10*3/mm3      Monocytes Absolute 0.77 10*3/mm3      [place] Cells Slight/1+     Macrocytes Slight/1+     WBC Morphology Normal     Platelet Morphology Normal    Phosphorus [[id number]]  (Abnormal) Collected: 10/08/20 0541    Specimen: Blood Updated: 10/08/20 0629     Phosphorus 2.2 mg/dL     Magnesium [[id number]]  (Abnormal) Collected: 10/08/20 0541    Specimen: Blood Updated: 10/08/20 0629     Magnesium 3.1 mg/dL     BUN [[id number]]  (Abnormal) Collected: 10/08/20 0541    Specimen: Blood Updated: 10/08/20 0629     BUN 25 mg/dL     Comprehensive Metabolic Panel [[id number]]  (Abnormal) Collected: 10/08/20 0541    Specimen: Blood Updated: 10/08/20 0628     Glucose 187 mg/dL      BUN --     Comment: Testing performed by alternate method        Creatinine 2.15 mg/dL      Sodium 142 mmol/L      Potassium 3.7 mmol/L      Chloride 113 mmol/L      CO2 14.0 mmol/L      Calcium 7.8 mg/dL      Total Protein 5.0 g/dL      Albumin 3.00 g/dL      ALT (SGPT) 59 U/L      AST (SGOT) 119 U/L      Alkaline Phosphatase 53 U/L      Total Bilirubin 0.4  mg/dL      eGFR Non African Amer 29 mL/min/1.73      Globulin 2.0 gm/dL      A/G Ratio 1.5 g/dL      BUN/Creatinine Ratio --     Comment: Testing not performed        Anion Gap 15.0 mmol/L     Narrative:      GFR Normal >60  Chronic Kidney Disease <60  Kidney Failure <15      CK [[id number]]  (Abnormal) Collected: 10/08/20 0541    Specimen: Blood Updated: 10/08/20 0621     Creatine Kinase 1,515 U/L     Calcium, Ionized [[id number]]  (Abnormal) Collected: 10/08/20 0541    Specimen: Blood Updated: 10/08/20 0615     Ionized Calcium 1.14 mmol/L     Lactic Acid, Plasma [[id number]]  (Abnormal) Collected: 10/08/20 0541    Specimen: Blood Updated: 10/08/20 0614     Lactate 5.5 mmol/L     aPTT [[id number]]  (Normal) Collected: 10/08/20 0541    Specimen: Blood Updated: 10/08/20 0607     PTT 26.6 seconds     POC Glucose Once [[id number]]  (Abnormal) Collected: 10/08/20 0546    Specimen: Blood Updated: 10/08/20 0547     Glucose 174 mg/dL      Comment: Serial Number: [id number]:  [id number]       POC Glucose Once [[id number]]  (Abnormal) Collected: 10/08/20 0458    Specimen: Blood Updated: 10/08/20 0459     Glucose 189 mg/dL      Comment: Serial Number: [id number]:  [id number]       CK [[id number]]  (Abnormal) Collected: 10/08/20 0034    Specimen: Blood Updated: 10/08/20 0411     Creatine Kinase 1,266 U/L     POC Glucose Once [[id number]]  (Abnormal) Collected: 10/08/20 0402    Specimen: Blood Updated: 10/08/20 0402     Glucose 226 mg/dL      Comment: Serial Number: [id number]:  [id number]       Blood Gas, Arterial [[id number]]  (Abnormal) Collected: 10/08/20 0317    Specimen: Arterial Blood Updated: 10/08/20 0322     Site Right Radial     Gary's Test Positive     pH, Arterial 7.345 pH units      pCO2, Arterial 28.2 mm Hg      pO2, Arterial 372.3 mm Hg      HCO3, Arterial 15.4 mmol/L      Base Excess, Arterial -9.1 mmol/L      Comment: Serial Number: [id number]:  [id number]        O2 Saturation, Arterial 100.0 %       CO2 Content 16.3 mmol/L      Barometric Pressure for Blood Gas --     Comment: N/A        Modality Adult Vent     FIO2 60 %      Ventilator Mode ;AC     Set Tidal Volume 700     PEEP 5     Hemodilution No     Respiratory Rate 18    POC Glucose Once [[id number]]  (Abnormal) Collected: 10/08/20 0258    Specimen: Blood Updated: 10/08/20 0259     Glucose 228 mg/dL      Comment: Serial Number: [id number]:  [id number]       POC Glucose Once [[id number]]  (Abnormal) Collected: 10/08/20 0204    Specimen: Blood Updated: 10/08/20 0205     Glucose 236 mg/dL      Comment: Serial Number: [id number]:  [id number]       BUN [[id number]]  (Normal) Collected: 10/08/20 0034    Specimen: Blood Updated: 10/08/20 0127     BUN 23 mg/dL     Troponin [[id number]]  (Abnormal) Collected: 10/08/20 0034    Specimen: Blood Updated: 10/08/20 0127     Troponin T 1.960 ng/mL     Narrative:      Troponin T Reference Range:  <= 0.03 ng/mL-   Negative for AMI  >0.03 ng/mL-     Abnormal for myocardial necrosis.  Clinicians would have to utilize clinical acumen, EKG, Troponin and serial changes to determine if it is an Acute Myocardial Infarction or myocardial injury due to an underlying chronic condition.       Results may be falsely decreased if patient taking Biotin.      Comprehensive Metabolic Panel [[id number]]  (Abnormal) Collected: 10/08/20 0034    Specimen: Blood Updated: 10/08/20 0123     Glucose 280 mg/dL      BUN --     Comment: Testing performed by alternate method        Creatinine 1.98 mg/dL      Sodium 142 mmol/L      Potassium 2.8 mmol/L      Chloride 108 mmol/L      CO2 13.0 mmol/L      Calcium 8.4 mg/dL      Total Protein 5.7 g/dL      Albumin 3.40 g/dL      ALT (SGPT) 62 U/L      AST (SGOT) 124 U/L      Alkaline Phosphatase 63 U/L      Total Bilirubin 0.7 mg/dL      eGFR Non African Amer 32 mL/min/1.73      Globulin 2.3 gm/dL      A/G Ratio 1.5 g/dL      BUN/Creatinine Ratio --     Comment: Testing not performed        Anion  Gap 21.0 mmol/L     Narrative:      GFR Normal >60  Chronic Kidney Disease <60  Kidney Failure <15      Magnesium [[id number]]  (Normal) Collected: 10/08/20 0034    Specimen: Blood Updated: 10/08/20 0123     Magnesium 1.7 mg/dL     Phosphorus [[id number]]  (Normal) Collected: 10/08/20 0034    Specimen: Blood Updated: 10/08/20 0123     Phosphorus 3.0 mg/dL     Lactic Acid, Plasma [[id number]]  (Abnormal) Collected: 10/08/20 0034    Specimen: Blood Updated: 10/08/20 0116     Lactate 7.8 mmol/L     CBC & Differential [[id number]]  (Abnormal) Collected: 10/08/20 0034    Specimen: Blood Updated: 10/08/20 0111    Narrative:      The following orders were created for panel order CBC & Differential.  Procedure                               Abnormality         Status                     ---------                               -----------         ------                     CBC Auto Differential[[id number]]        Abnormal            Final result                 Please view results for these tests on the individual orders.    CBC Auto Differential [[id number]]  (Abnormal) Collected: 10/08/20 0034    Specimen: Blood Updated: 10/08/20 0111     WBC 23.00 10*3/mm3      RBC 3.08 10*6/mm3      Hemoglobin 10.9 g/dL      Hematocrit 32.6 %      .7 fL      MCH 35.3 pg      MCHC 33.4 g/dL      RDW 13.8 %      RDW-SD 50.8 fl      MPV 8.1 fL      Platelets 311 10*3/mm3     Scan Slide [[id number]] Collected: 10/08/20 0034    Specimen: Blood Updated: 10/08/20 0111     Scan Slide --     Comment: See Manual Differential Results       Manual Differential [[id number]]  (Abnormal) Collected: 10/08/20 0034    Specimen: Blood Updated: 10/08/20 0111     Neutrophil % 70.0 %      Lymphocyte % 4.0 %      Monocyte % 3.0 %      Bands %  22.0 %      Metamyelocyte % 1.0 %      Neutrophils Absolute 21.16 10*3/mm3      Lymphocytes Absolute 0.92 10*3/mm3      Monocytes Absolute 0.69 10*3/mm3      Anisocytosis Slight/1+     Poikilocytes Slight/1+     Toxic  Granulation Slight/1+     Large Platelets Slight/1+    Calcium, Ionized [[id number]]  (Abnormal) Collected: 10/08/20 0034    Specimen: Blood Updated: 10/08/20 0106     Ionized Calcium 1.17 mmol/L     aPTT [[id number]]  (Normal) Collected: 10/08/20 0034    Specimen: Blood Updated: 10/08/20 0100     PTT 24.3 seconds     Respiratory Panel PCR w/COVID-19(SARS-CoV-2) C[facility]ILIA/SINDY/NASEEM/PAD/COR/MAD In-House, NP Swab in UTM/VTM, 3-4 HR TAT - Swab, Nasopharynx [[id number]]  (Normal) Collected: 10/07/20 2015    Specimen: Swab from Nasopharynx Updated: 10/07/20 2210     ADENOVIRUS, PCR Not Detected     Coronavirus 229E Not Detected     Coronavirus HKU1 Not Detected     Coronavirus NL63 Not Detected     Coronavirus OC43 Not Detected     COVID19 Not Detected     Human Metapneumovirus Not Detected     Human Rhinovirus/Enterovirus Not Detected     Influenza A PCR Not Detected     Influenza A H1 Not Detected     Influenza A H1 2009 PCR Not Detected     Influenza A H3 Not Detected     Influenza B PCR Not Detected     Parainfluenza Virus 1 Not Detected     Parainfluenza Virus 2 Not Detected     Parainfluenza Virus 3 Not Detected     Parainfluenza Virus 4 Not Detected     RSV, PCR Not Detected     Bordetella pertussis pcr Not Detected     Bordetella parapertussis PCR Not Detected     Chlamydophila pneumoniae PCR Not Detected     Mycoplasma pneumo by PCR Not Detected    Narrative:      Fact sheet for providers: https://docs.[employer]/wp-content/uploads/FVF4711-2551-KE5.1-EUA-Provider-Fact-Sheet-3.pdf    Fact sheet for patients: https://docs.[employer]/wp-content/uploads/MLT4189-3295-FR7.1-EUA-Patient-Fact-Sheet-1.pdf    MRSA Screen, PCR (Inpatient) - Swab, Nares [[id number]]  (Normal) Collected: 10/07/20 2015    Specimen: Swab from Nares Updated: 10/07/20 2154     MRSA PCR No MRSA Detected    CK [[id number]]  (Abnormal) Collected: 10/07/20 1931    Specimen: Blood Updated: 10/07/20 2115     Creatine Kinase 362 U/L     Lactic Acid, Reflex  Timer (This will reflex a repeat order 3-3:15 hours after ordered.) [[id number]] Collected: 10/07/20 1723    Specimen: Blood Updated: 10/07/20 2045     Hold Tube Hold for add-ons.     Comment: Auto resulted.       BUN [[id number]]  (Normal) Collected: 10/07/20 1931    Specimen: Blood Updated: 10/07/20 2026     BUN 21 mg/dL     Troponin [[id number]]  (Abnormal) Collected: 10/07/20 1931    Specimen: Blood Updated: 10/07/20 2025     Troponin T 2.010 ng/mL     Narrative:      Troponin T Reference Range:  <= 0.03 ng/mL-   Negative for AMI  >0.03 ng/mL-     Abnormal for myocardial necrosis.  Clinicians would have to utilize clinical acumen, EKG, Troponin and serial changes to determine if it is an Acute Myocardial Infarction or myocardial injury due to an underlying chronic condition.       Results may be falsely decreased if patient taking Biotin.      Lactic Acid, Plasma [[id number]]  (Abnormal) Collected: 10/07/20 1931    Specimen: Blood Updated: 10/07/20 2025     Lactate 5.7 mmol/L     Comprehensive Metabolic Panel [[id number]]  (Abnormal) Collected: 10/07/20 1931    Specimen: Blood Updated: 10/07/20 2013     Glucose 178 mg/dL      BUN --     Comment: Testing performed by alternate method        Creatinine 1.77 mg/dL      Sodium 143 mmol/L      Potassium 3.4 mmol/L      Comment: Slight hemolysis detected by analyzer. Results may be affected.        Chloride 108 mmol/L      CO2 16.0 mmol/L      Calcium 8.1 mg/dL      Total Protein 6.6 g/dL      Albumin 3.80 g/dL      ALT (SGPT) 66 U/L      AST (SGOT) 110 U/L      Alkaline Phosphatase 80 U/L      Total Bilirubin 0.8 mg/dL      eGFR Non African Amer 37 mL/min/1.73      Globulin 2.8 gm/dL      A/G Ratio 1.4 g/dL      BUN/Creatinine Ratio --     Comment: Testing not performed        Anion Gap 19.0 mmol/L     Narrative:      GFR Normal >60  Chronic Kidney Disease <60  Kidney Failure <15      Magnesium [[id number]]  (Normal) Collected: 10/07/20 1931    Specimen: Blood Updated:  "10/07/20 2013     Magnesium 2.0 mg/dL     Phosphorus [[id number]]  (Normal) Collected: 10/07/20 1931    Specimen: Blood Updated: 10/07/20 2013     Phosphorus 2.6 mg/dL     aPTT [[id number]]  (Abnormal) Collected: 10/07/20 1931    Specimen: Blood Updated: 10/07/20 1951     PTT 21.9 seconds     Protime-INR [[id number]]  (Abnormal) Collected: 10/07/20 1931    Specimen: Blood Updated: 10/07/20 1951     Protime 12.4 Seconds      INR 1.13    Calcium, Ionized [[id number]]  (Abnormal) Collected: 10/07/20 1931    Specimen: Blood Updated: 10/07/20 1951     Ionized Calcium 1.09 mmol/L     Blood Culture - Blood, Hand, Right [[id number]] Collected: 10/07/20 1931    Specimen: Blood from Hand, Right Updated: 10/07/20 1950    Blood Culture - Blood, Hand, Right [[id number]] Collected: 10/07/20 1931    Specimen: Blood from Hand, Right Updated: 10/07/20 1950    Procalcitonin [[id number]]  (Normal) Collected: 10/07/20 1732    Specimen: Blood Updated: 10/07/20 1913     Procalcitonin 0.05 ng/mL     Narrative:      As a Marker for Sepsis (Non-Neonates):   1. <0.5 ng/mL represents a low risk of severe sepsis and/or septic shock.  1. >2 ng/mL represents a high risk of severe sepsis and/or septic shock.    As a Marker for Lower Respiratory Tract Infections that require antibiotic therapy:  PCT on Admission     Antibiotic Therapy             6-12 Hrs later  > 0.5                Strongly Recommended            >0.25 - <0.5         Recommended  0.1 - 0.25           Discouraged                   Remeasure/reassess PCT  <0.1                 Strongly Discouraged          Remeasure/reassess PCT      As 28 day mortality risk marker: \"Change in Procalcitonin Result\" (> 80 % or <=80 %) if Day 0 (or Day 1) and Day 4 values are available. Refer to http://www.[facility]-pct-calculator.com/   Change in PCT <=80 %   A decrease of PCT levels below or equal to 80 % defines a positive change in PCT test result representing a higher risk for 28-day all-cause mortality of " patients diagnosed with severe sepsis or septic shock.  Change in PCT > 80 %   A decrease of PCT levels of more than 80 % defines a negative change in PCT result representing a lower risk for 28-day all-cause mortality of patients diagnosed with severe sepsis or septic shock.                Results may be falsely decreased if patient taking Biotin.     CK Total & CKMB [[id number]]  (Abnormal) Collected: 10/07/20 1732    Specimen: Blood Updated: 10/07/20 1906     CKMB 11.18 ng/mL      Creatine Kinase 164 U/L     Narrative:      CKMB results may be falsely decreased if patient taking Biotin.    Extra Tubes [[id number]] Collected: 10/07/20 1732    Specimen: Blood, Venous Line Updated: 10/07/20 1845    Narrative:      The following orders were created for panel order Extra Tubes.  Procedure                               Abnormality         Status                     ---------                               -----------         ------                     Gold Top - SST[[id number]]                                   Final result                 Please view results for these tests on the individual orders.    Gold Top - SST [[id number]] Collected: 10/07/20 1732    Specimen: Blood Updated: 10/07/20 1845     Extra Tube Hold for add-ons.     Comment: Auto resulted.       BUN [[id number]]  (Normal) Collected: 10/07/20 1732    Specimen: Blood Updated: 10/07/20 1812     BUN 18 mg/dL     Troponin [[id number]]  (Abnormal) Collected: 10/07/20 1732    Specimen: Blood Updated: 10/07/20 1810     Troponin T 0.718 ng/mL     Narrative:      Troponin T Reference Range:  <= 0.03 ng/mL-   Negative for AMI  >0.03 ng/mL-     Abnormal for myocardial necrosis.  Clinicians would have to utilize clinical acumen, EKG, Troponin and serial changes to determine if it is an Acute Myocardial Infarction or myocardial injury due to an underlying chronic condition.       Results may be falsely decreased if patient taking Biotin.      Scan Slide [[id number]]  Collected: 10/07/20 1732    Specimen: Blood Updated: 10/07/20 1810     Scan Slide --     Comment: See Manual Differential Results       Manual Differential [[id number]]  (Abnormal) Collected: 10/07/20 1732    Specimen: Blood Updated: 10/07/20 1810     Neutrophil % 64.0 %      Lymphocyte % 10.0 %      Monocyte % 1.0 %      Bands %  25.0 %      Neutrophils Absolute 18.07 10*3/mm3      Lymphocytes Absolute 2.03 10*3/mm3      Monocytes Absolute 0.20 10*3/mm3      Anisocytosis Slight/1+     Poikilocytes Slight/1+     Toxic Granulation Slight/1+     Large Platelets Slight/1+    CBC & Differential [[id number]]  (Abnormal) Collected: 10/07/20 1732    Specimen: Blood Updated: 10/07/20 1810    Narrative:      The following orders were created for panel order CBC & Differential.  Procedure                               Abnormality         Status                     ---------                               -----------         ------                     CBC Auto Differential[[id number]]        Abnormal            Final result                 Please view results for these tests on the individual orders.    CBC Auto Differential [[id number]]  (Abnormal) Collected: 10/07/20 1732    Specimen: Blood Updated: 10/07/20 1810     WBC 20.30 10*3/mm3      RBC 3.21 10*6/mm3      Hemoglobin 11.2 g/dL      Hematocrit 33.7 %      .8 fL      MCH 34.8 pg      MCHC 33.2 g/dL      RDW 13.6 %      RDW-SD 49.4 fl      MPV 8.2 fL      Platelets 336 10*3/mm3     Basic Metabolic Panel [[id number]]  (Abnormal) Collected: 10/07/20 1732    Specimen: Blood Updated: 10/07/20 1807     Glucose 169 mg/dL      BUN --     Comment: Testing performed by alternate method        Creatinine 1.50 mg/dL      Sodium 144 mmol/L      Potassium 3.9 mmol/L      Chloride 106 mmol/L      CO2 15.0 mmol/L      Calcium 8.1 mg/dL      eGFR Non African Amer 45 mL/min/1.73      BUN/Creatinine Ratio --     Comment: Testing not performed        Anion Gap 23.0 mmol/L     Narrative:       GFR Normal >60  Chronic Kidney Disease <60  Kidney Failure <15      POC Lactate [[id number]]  (Abnormal) Collected: 10/07/20 1723    Specimen: Blood Updated: 10/07/20 1734     Lactate 9.6 mmol/L      Comment: Serial Number: [id number]:  [id number]       POCT Electrolytes +HGB +HCT [[id number]]  (Abnormal) Collected: 10/07/20 1723    Specimen: Blood Updated: 10/07/20 1732     Sodium 144 mmol/L      POC Potassium 3.8 mmol/L      Ionized Calcium 0.88 mmol/L      Comment: Serial Number: [id number]:  [id number]        Glucose 159 mg/dL      Hematocrit 32 %      Hemoglobin 10.8 g/dL     Blood Gas, Arterial [[id number]]  (Abnormal) Collected: 10/07/20 1723    Specimen: Arterial Blood Updated: 10/07/20 1730     Site Right Radial     Gary's Test Positive     pH, Arterial 7.453 pH units      pCO2, Arterial 25.3 mm Hg      pO2, Arterial 478.4 mm Hg      HCO3, Arterial 17.7 mmol/L      Base Excess, Arterial -5.0 mmol/L      Comment: Serial Number: [id number]:  [id number]        O2 Saturation, Arterial 100.0 %      CO2 Content 18.5 mmol/L      Barometric Pressure for Blood Gas --     Comment: N/A        Modality Adult Vent     FIO2 100 %      Ventilator Mode ;AC     Set Tidal Volume 700     PEEP 5     Hemodilution No     Respiratory Rate 18    POC Glucose Once [[id number]]  (Abnormal) Collected: 10/07/20 1723    Specimen: Blood Updated: 10/07/20 1730     Glucose 159 mg/dL      Comment: Serial Number: [id number]:  [id number]             Imaging & Other Studies    Imaging Results (Last 72 Hours)     Procedure Component Value Units Date/Time    XR Chest 1 View [[id number]] Collected: 10/08/20 0825     Updated: 10/08/20 0840    Narrative:         DATE OF EXAM:   10/8/2020 4:19 AM     PROCEDURE:   XR CHEST 1 VW-     INDICATIONS:   SOA; I46.9-Cardiac arrest, cause unspecified; I49.01-Ventricular  fibrillation     COMPARISON:  10/7/2020     TECHNIQUE:   Portable chest radiograph.     FINDINGS:    Endotracheal tube tip 15 mm above the  ting. The patient slightly  rotated to the right. Esophagogastric tube below the diaphragm. Left IJ  central venous catheter tip overlies the upper SVC. Stable cardiomegaly.  No pneumothorax. No consolidation or pleural effusion.       Impression:      1. Placement of an esophagogastric tube with the tip below the  diaphragm.   2. Stable ET tube and left IJ central venous catheter.  3. Cardiomegaly.  4. Clear lungs.     Electronically Signed By-Tom Paiz On:10/8/2020 8:26 AM  This report was finalized on [id number] by  Tom Paiz, .    XR Abdomen KUB [[id number]] Collected: 10/07/20 2155     Updated: 10/07/20 2157    Narrative:      DATE OF EXAM:  10/7/2020 8:52 PM     PROCEDURE:  XR ABDOMEN KUB-     INDICATIONS:  NG placement; I46.9-Cardiac arrest, cause unspecified;  I49.01-Ventricular fibrillation     COMPARISON:  No Comparisons Available     TECHNIQUE:   Single radiographic view of the abdomen was obtained.        FINDINGS:  There is a nasogastric tube noted with its tip in the left quadrant of  the abdomen in the area of the stomach.        Impression:      1.Nasogastric tube tip is within the stomach.     Electronically Signed By-Eduardo Pratt On:10/7/2020 9:55 PM  This report was finalized on [id number] by  Eduardo Pratt .    XR Chest 1 View [[id number]] Collected: 10/07/20 2152     Updated: 10/07/20 2157    Narrative:      DATE OF EXAM:  10/7/2020 8:46 PM     PROCEDURE:  XR CHEST 1 VW-     INDICATIONS:  central line; I46.9-Cardiac arrest, cause unspecified;  I49.01-Ventricular fibrillation     COMPARISON:  10/07/2020     TECHNIQUE:   Single radiographic AP view of the chest was obtained.     FINDINGS:  An ET tube is suggested with its tip above the ting. There is a left  IJ line noted with its tip at the junction of the brachiocephalic vein  with the superior vena cava. There is no definite pneumothorax. The  heart is enlarged. Lungs seem relatively clear.        Impression:       1.Cardiomegaly  2.No definite infiltrates.  3.New left IJ line is noted.  4.There is an ET tube present. The tip is just above the ting. It may  be pulled back 2.5 cm for more optimal positioning.     Electronically Signed By-Eduardo Pratt On:10/7/2020 9:55 PM  This report was finalized on [id number] by  Eduardo Pratt, .    CT Head Without Contrast [[id number]] Collected: 10/07/20 1849     Updated: 10/07/20 1853    Narrative:         DATE OF EXAM:  10/7/2020 6:37 PM     PROCEDURE:   CT HEAD WO CONTRAST-     INDICATIONS:   Mental status change, unknown cause; I46.9-Cardiac arrest, cause  unspecified; I49.01-Ventricular fibrillation     COMPARISON:  No Comparisons Available     TECHNIQUE:   Routine transaxial cuts were obtained through the head without the  administration of contrast. Automated exposure control and iterative  reconstruction methods were used.      FINDINGS:  There are paranasal sinus changes that could relate to intubation. There  is some suggested cerebral atrophy. There is no underlying hemorrhage.  There is no midline shift. There are no abnormal extra-axial fluid  collections. There is atherosclerotic changes involving the vertebral  arteries and carotid siphon.        Impression:      1.No acute intracranial abnormality.  2.There is some cerebral atrophy.  3.Atherosclerotic changes are present.  4.There is an appearance to the paranasal sinuses which could relate to  intubation.     At some point MR may be of benefit in further workup of this patient as  thought clinically indicated.     Electronically Signed ByJames Pratt On:10/7/2020 6:51 PM  This report was finalized on [id number] by  Eduardo Pratt, .    XR Chest 1 View [[id number]] Collected: 10/07/20 1848     Updated: 10/07/20 1851    Narrative:      DATE OF EXAM:  10/7/2020 6:37 PM     PROCEDURE:  XR CHEST 1 VW-     INDICATIONS:  Cardiac arrest; I46.9-Cardiac arrest, cause unspecified;  I49.01-Ventricular fibrillation     COMPARISON:  No  Comparisons Available     TECHNIQUE:   Single radiographic AP view of the chest was obtained.     FINDINGS:  Extraneous objects are seen overlying the chest obscuring the lungs to  some degree. The heart looks enlarged. There is an ET tube is suggested  with its tip just above the ting. This could be pulled back 1.5 cm for  more optimal positioning. The visualized lungs look relatively clear.  There are no pleural effusions.        Impression:      1.No definite acute pulmonary process.  2.Cardiomegaly  3.ET tube could be pulled back slightly for more optimal positioning.     Electronically Signed By-Eduardo Pratt On:10/7/2020 6:49 PM  This report was finalized on [id number] by  Eduardo Pratt, .          Assessment    Cardiac arrest (CMS/HCC)-etiology likely cardiac event  -consult cardiology   -monitor troponin 1.960  -ECHO EF 31-35%   -CT head negative   -TTM therapy ordered, follow protocol  -replace electrolytes as needed    Unresponsive-concern of anoxic brain injury  -CT head without acute findings  -TTM therapy ordered  -EEG ordered    Acute respiratory failure (CMS/[facility])   -Intubated prior to arrival  -ABG and vent reviewed, adjust as appropriate.  Reduce tidal volume to 480  -obtain sputum cx   -COVID-19 not detected    Septic shock (CMS/[facility])  -blood cultures ordered  -UA ordered  -lactate 9.6, admin fluid bolus.  Lactate 5.5  -check sputum cx   -Levo off.  Now on Vaso  -due to significant bradycardia change Levophed to dopamine drip.    VALERIE (acute kidney injury) (CMS/HCC)  -creatinine 2.15  -on HCO3 gtt   -renal consulted.  High risk for further progression and may need hemodialysis.  -check UA    Elevated troponin  -consult cardiology, Dr. Rapp  -ECHO EF 31-35%  -1.960    Leukocytosis  -WBC 12.90  -abx as above   -follow cx   -No obvious evidence of aspiration pneumonia    Hypocalcemia  -replaced with 2 grams    Anemia  -hgb 10.2, monitor    Mixed hyperlipidemia  -resume home med when appropriate      HTN (hypertension)  -resume home med when appropriate     CAD (coronary artery disease)  -resume home med when appropriate     Gastroesophageal reflux disease  -protonix           DVT ppy:  -SCDs    PPI ppy:  -protonix     Full Code    Attending physician statement:  Patient remains critically ill.  Total critical care time spent is 32 minutes which does not include any time for procedures.  Critical care time is exclusive of time spent by the nurse practitioner.  Above note scribed by nurse practitioner for me and later reviewed by me for accuracy . I've examined the patient and reviewed all labs and images.  Discussed with son in detail.  I have directly participated in the evaluation and management of this patient.  Clint Mendoza MD

## 2020-10-08 NOTE — ATTESTATION SEPSIS FOCUSED EXAM
SEPTIC SHOCK FOCUSED EXAM ATTESTATION    I attest that I have reassessed tissue perfusion after the fluid bolus given.  Bolus was given overnight.     Nanci Matute, APRN  10/08/20  07:37 EDT

## 2020-10-08 NOTE — PROCEDURES
Date:  10/8/20  Procedure: Temporary HD catheter  Indication:  HD/CRRT   Performed by:  Nanci ARIAS  Attending:  Dr. Mendoza  Consent:  Obtained       A time-out was completed verifying correct patient, procedure, site, positioning, and special equipment if applicable. The patient was placed in a dependent position appropriate for central line placement based on the vein to be cannulated. The patient’s right neck was prepped and draped in sterile fashion. 1% Lidocaine was used to anesthetize the surrounding skin area. A triple lumen 12.5 Yi 20 cm temporary HD catheter was introduced into the internal jugular vein using the Seldinger technique and under ultrasound guidance. The catheter was threaded smoothly over the guide wire and appropriate blood return was obtained. Each lumen of the catheter was evacuated of air and flushed with sterile saline. The catheter was then sutured in place to the skin and a sterile dressing applied. Perfusion to the extremity distal to the point of catheter insertion was checked and found to be adequate.     Estimated Blood Loss: < 2 ml  The patient tolerated the procedure well and there were no complications.  A chest x-ray is ordered to confirm placement

## 2020-10-08 NOTE — PROGRESS NOTES
Discharge Planning Assessment   [place]     Patient Name: Primitivo Contreras  MRN: [id number]  Today's Date: 10/8/2020    Admit Date: 10/7/2020    Discharge Needs Assessment     Row Name 10/08/20 1333       Living Environment    Current Living Arrangements  home/apartment/condo    Able to Return to Prior Arrangements  yes       Resource/Environmental Concerns    Transportation Concerns  car, none       Discharge Needs Assessment    Readmission Within the Last 30 Days  no previous admission in last 30 days        Discharge Plan     Row Name 10/08/20 1336       Plan    Plan  D/C Plan : Pending . pt is currently on the vent .    Plan Comments  Pt was a cardiac arrest and is on the vent , Insulin gtt, Cardene gtt , Tridil gtt and Vaso gtt . Hypotheria protocol . Trop .718        Continued Care and Services - Admitted Since 10/7/2020    Coordination has not been started for this encounter.         Demographic Summary     Row Name 10/08/20 1332       General Information    Admission Type  inpatient    Arrived From  emergency department    Preferred Language  English     Used During This Interaction  no        Functional Status     Row Name 10/08/20 1332       Functional Status    Usual Activity Tolerance  good    Current Activity Tolerance  poor       Mental Status    General Appearance WDL  WDL       Mental Status Summary    Recent Changes in Mental Status/Cognitive Functioning  unable to assess ON VENT                   Lizette Bull RN

## 2020-10-08 NOTE — PROCEDURES
"Insert Central Line At Bedside    Date/Time: 10/7/2020 8:55 PM  Performed by: Tian Arias APRN  Authorized by: Nanci Matute APRN   Consent: The procedure was performed in an emergent situation. Verbal consent obtained.  Risks and benefits: risks, benefits and alternatives were discussed (Had previously been discussed with next of kin.)  Consent given by: Next of kin.  Patient understanding: patient states understanding of the procedure being performed (Next of kin, patient is unresponsive.)  Patient consent: the patient's understanding of the procedure matches consent given  Procedure consent: procedure consent matches procedure scheduled  Relevant documents: relevant documents present and verified  Test results: test results available and properly labeled  Site marked: the operative site was marked  Imaging studies: imaging studies available  Required items: required blood products, implants, devices, and special equipment available  Patient identity confirmed: arm band, anonymous protocol, patient vented/unresponsive and hospital-assigned identification number  Time out: Immediately prior to procedure a \"time out\" was called to verify the correct patient, procedure, equipment, support staff and site/side marked as required.  Indications: vascular access  Anesthesia: local infiltration    Anesthesia:  Local Anesthetic: lidocaine 2% without epinephrine  Anesthetic total: 5 mL    Sedation:  Patient sedated: no    Preparation: skin prepped with ChloraPrep  Skin prep agent dried: skin prep agent completely dried prior to procedure  Sterile barriers: all five maximum sterile barriers used - cap, mask, sterile gown, sterile gloves, and large sterile sheet  Hand hygiene: hand hygiene performed prior to central venous catheter insertion  Location details: left internal jugular  Patient position: flat  Catheter type: triple lumen  Catheter size: 7 Fr  Ultrasound guidance: yes  Sterile ultrasound techniques: " sterile gel and sterile probe covers were used  Number of attempts: 1  Successful placement: yes  Post-procedure: line sutured and dressing applied  Assessment: blood return through all ports,  placement verified by x-ray and no pneumothorax on x-ray (Official CXR pending radiologist interpretation at time of dictation.)  Patient tolerance: patient tolerated the procedure well with no immediate complications      HUGO Singh  [facility]  10/07/2020

## 2020-10-08 NOTE — PLAN OF CARE
Pt on hypothermia protocol at this time.  OT will complete order and await further orders if appropriate.  OT did not enter room.

## 2020-10-08 NOTE — CONSULTS
Referring Provider: Intensivist  Reason for Consultation: Post cardiopulmonary arrest    Patient Care Team:  Hawa Estevez NP as PCP - General (Nurse Practitioner)    Chief complaint found down    Subjective .     History of present illness:  Primitivo Contreras is a 85 y.o. male with history of coronary disease hypertension hyperlipidemia and other medical problems was transferred from [facility] after cardiac arrest.  Patient was found down by family for unknown minutes patient did not start any CPR but EMS was called who came in 10 minutes and took him to the nearest hospital where he was found to be in A. fib cardiac arrest and received resuscitation at that time with epinephrine sodium bicarb.  Amiodarone bolus and cardioversion.  Patient was then intubated and was not responsive patient's EKG showed sinus rhythm right bundle branch block patient was on multiple vasopressors and transferred to our hospital where he was placed on IV antibiotics.  Patient is currently on a ventilator and on multiple vasopressors.    Review of Systems   Unable to perform ROS: intubated       History  Past Medical History:   Diagnosis Date   • Coronary artery disease    • Hyperlipidemia    • Hypertension        Past Surgical History:   Procedure Laterality Date   • CARDIAC CATHETERIZATION     • CORONARY STENT PLACEMENT         No family history on file.    Social History     Tobacco Use   • Smoking status: Former Smoker   • Smokeless tobacco: Never Used   • Tobacco comment: quit 30 yrs ago   Substance Use Topics   • Alcohol use: Yes     Comment: social/ occasional   • Drug use: No        Medications Prior to Admission   Medication Sig Dispense Refill Last Dose   • aspirin (ADULT ASPIRIN EC LOW STRENGTH) 81 MG EC tablet Take 81 mg by mouth Daily.   Unknown at Unknown time   • atorvastatin (LIPITOR) 40 MG tablet Take 40 mg by mouth Daily.  3 Unknown at Unknown time   • clopidogrel (PLAVIX) 75 MG tablet Take 75 mg by mouth  Daily.  3 Unknown at Unknown time   • dilTIAZem CD (CARDIZEM CD) 120 MG 24 hr capsule Take 1 capsule by mouth 2 (Two) Times a Day. 180 capsule 3 Unknown at Unknown time   • hydrALAZINE (APRESOLINE) 100 MG tablet TAKE 1 TABLET BY MOUTH EVERY 12 HOURS 120 tablet 4 Unknown at Unknown time   • hydroCHLOROthiazide (MICROZIDE) 12.5 MG capsule TAKE 1 CAPSULE BY MOUTH EVERY DAY 90 capsule 3 Unknown at Unknown time   • HYDROcodone-acetaminophen (NORCO)  MG per tablet Take 1 tablet by mouth Every 6 (Six) Hours As Needed.  0 Unknown at Unknown time   • omeprazole (priLOSEC) 40 MG capsule Take 40 mg by mouth Daily.  3 Unknown at Unknown time   • ramipril (ALTACE) 10 MG capsule Take 10 mg by mouth 2 (two) times a day.   Unknown at Unknown time         Patient has no known allergies.    Scheduled Meds:!Vancomycin Level Draw Needed, , Does not apply, Once  artificial tears, , Both Eyes, Q2H  [START ON 10/9/2020] cefepime, 2 g, Intravenous, Q24H  fosphenytoin, 200 mg PE, Intravenous, Q12H  pantoprazole, 40 mg, Intravenous, Q AM  sodium chloride, 30 mL/kg, Intravenous, Once  sodium chloride, 10 mL, Intravenous, Q12H      Continuous Infusions:DOPamine, 2-20 mcg/kg/min, Last Rate: 3 mcg/kg/min (10/08/20 1625)  fentanyl 10 mcg/mL,  mcg/hr, Last Rate: 50 mcg/hr (10/08/20 1342)  insulin, 1-20 Units/hr, Last Rate: 5 Units/hr (10/08/20 0700)  norepinephrine, 0.02-0.5 mcg/kg/min, Last Rate: Stopped (10/08/20 1420)  Pharmacy to Dose Cefepime,   Pharmacy to dose vancomycin,   propofol, 5-50 mcg/kg/min, Last Rate: 25 mcg/kg/min (10/08/20 1435)  sodium bicarbonate drip less than 75 mEq/bag, 75 mEq, Last Rate: 75 mEq (10/08/20 0736)  vasopressin, 0.03 Units/min, Last Rate: 0.01 Units/min (10/08/20 1625)      PRN Meds:.dextrose  •  fentanyl 10 mcg/mL  •  magnesium sulfate  •  norepinephrine  •  ondansetron  •  Pharmacy to Dose Cefepime  •  Pharmacy to dose vancomycin  •  potassium chloride  •  propofol  •  [COMPLETED] Insert  "peripheral IV **AND** sodium chloride  •  sodium chloride  •  vancomycin  •  vecuronium    Objective     VITAL SIGNS  Vitals:    10/08/20 1400 10/08/20 1500 10/08/20 1600 10/08/20 1610   BP:       BP Location:       Patient Position:       Pulse: (!) 37 61 54 53   Resp:    18   Temp: (!) 91.9 °F (33.3 °C) 92.5 °F (33.6 °C) 93.2 °F (34 °C)    TempSrc: Bladder Bladder Bladder    SpO2: 100% 100% 100% 100%   Weight:       Height:           Flowsheet Rows      First Filed Value   Admission Height  182.9 cm (72\") Documented at 10/07/2020 1720   Admission Weight  81.6 kg (179 lb 14.3 oz) Documented at 10/07/2020 1720           TELEMETRY: Sinus bradycardia    Physical Exam:  Constitutional:       Appearance: Well-developed.   Eyes:      General: No scleral icterus.  HENT:      Head: Normocephalic and atraumatic.   Neck:      Vascular: No carotid bruit or JVD.   Pulmonary:      Effort: Pulmonary effort is normal.      Breath sounds: Normal breath sounds. No wheezing. No rales.   Cardiovascular:      Normal rate. Regular rhythm.      Murmurs: There is a systolic murmur.   Pulses:     Intact distal pulses.   Abdominal:      General: Bowel sounds are normal.      Palpations: Abdomen is soft.   Musculoskeletal: Normal range of motion.   Skin:     General: Skin is warm and dry.      Findings: No rash.   Neurological:      Comments: Intubated   Psychiatric:      Comments: Intubated          Results Review:   I reviewed the patient's new clinical results.  Lab Results (last 24 hours)     Procedure Component Value Units Date/Time    POC Glucose Once [[id number]]  (Abnormal) Collected: 10/08/20 1628    Specimen: Blood Updated: 10/08/20 1632     Glucose 131 mg/dL      Comment: Serial Number: [id number]:  [id number]       Blood Gas, Arterial [[id number]]  (Abnormal) Collected: 10/08/20 1437    Specimen: Arterial Blood Updated: 10/08/20 1441     Site Arterial Line     Gary's Test N/A     pH, Arterial 7.363 pH units      pCO2, " Arterial 32.7 mm Hg      pO2, Arterial 173.6 mm Hg      HCO3, Arterial 18.6 mmol/L      Base Excess, Arterial -6.0 mmol/L      Comment: Serial Number: [id number]:  [id number]        O2 Saturation, Arterial 99.5 %      CO2 Content 19.6 mmol/L      Barometric Pressure for Blood Gas --     Comment: N/A        Modality Adult Vent     FIO2 40 %      Ventilator Mode ;AC     Set Tidal Volume 480     PEEP 5     Hemodilution No     Respiratory Rate 18    BUN [[id number]]  (Abnormal) Collected: 10/08/20 1234    Specimen: Blood Updated: 10/08/20 1425     BUN 27 mg/dL     Phosphorus [[id number]]  (Normal) Collected: 10/08/20 1234    Specimen: Blood Updated: 10/08/20 1422     Phosphorus 2.8 mg/dL     Comprehensive Metabolic Panel [[id number]]  (Abnormal) Collected: 10/08/20 1234    Specimen: Blood Updated: 10/08/20 1421     Glucose 173 mg/dL      BUN --     Comment: Testing performed by alternate method        Creatinine 2.31 mg/dL      Sodium 141 mmol/L      Potassium 4.2 mmol/L      Chloride 108 mmol/L      CO2 17.0 mmol/L      Calcium 8.0 mg/dL      Total Protein 5.6 g/dL      Albumin 3.30 g/dL      ALT (SGPT) 64 U/L      AST (SGOT) 126 U/L      Alkaline Phosphatase 58 U/L      Total Bilirubin 0.5 mg/dL      eGFR Non African Amer 27 mL/min/1.73      Globulin 2.3 gm/dL      A/G Ratio 1.4 g/dL      BUN/Creatinine Ratio --     Comment: Testing not performed        Anion Gap 16.0 mmol/L     Narrative:      GFR Normal >60  Chronic Kidney Disease <60  Kidney Failure <15      Magnesium [[id number]]  (Abnormal) Collected: 10/08/20 1234    Specimen: Blood Updated: 10/08/20 1421     Magnesium 2.9 mg/dL     Calcium, Ionized [[id number]]  (Abnormal) Collected: 10/08/20 1234    Specimen: Blood Updated: 10/08/20 1322     Ionized Calcium 1.14 mmol/L     Lactic Acid, Plasma [[id number]]  (Abnormal) Collected: 10/08/20 1234    Specimen: Blood Updated: 10/08/20 1321     Lactate 3.4 mmol/L     Troponin [[id number]]  (Abnormal) Collected: 10/08/20 1234      Specimen: Blood Updated: 10/08/20 1320     Troponin T 1.310 ng/mL     Narrative:      Troponin T Reference Range:  <= 0.03 ng/mL-   Negative for AMI  >0.03 ng/mL-     Abnormal for myocardial necrosis.  Clinicians would have to utilize clinical acumen, EKG, Troponin and serial changes to determine if it is an Acute Myocardial Infarction or myocardial injury due to an underlying chronic condition.       Results may be falsely decreased if patient taking Biotin.      CK [[id number]]  (Abnormal) Collected: 10/08/20 1234    Specimen: Blood Updated: 10/08/20 1314     Creatine Kinase 1,220 U/L     aPTT [[id number]]  (Normal) Collected: 10/08/20 1234    Specimen: Blood Updated: 10/08/20 1301     PTT 27.2 seconds     CBC & Differential [[id number]]  (Abnormal) Collected: 10/08/20 1234    Specimen: Blood Updated: 10/08/20 1256    Narrative:      The following orders were created for panel order CBC & Differential.  Procedure                               Abnormality         Status                     ---------                               -----------         ------                     CBC Auto Differential[[id number]]        Abnormal            Final result                 Please view results for these tests on the individual orders.    CBC Auto Differential [[id number]]  (Abnormal) Collected: 10/08/20 1234    Specimen: Blood Updated: 10/08/20 1256     WBC 15.60 10*3/mm3      RBC 3.15 10*6/mm3      Hemoglobin 10.9 g/dL      Hematocrit 32.6 %      .6 fL      MCH 34.8 pg      MCHC 33.6 g/dL      RDW 13.9 %      RDW-SD 50.8 fl      MPV 7.9 fL      Platelets 292 10*3/mm3      Neutrophil % 82.6 %      Lymphocyte % 7.3 %      Monocyte % 9.9 %      Eosinophil % 0.0 %      Basophil % 0.2 %      Neutrophils, Absolute 12.90 10*3/mm3      Lymphocytes, Absolute 1.10 10*3/mm3      Monocytes, Absolute 1.50 10*3/mm3      Eosinophils, Absolute 0.00 10*3/mm3      Basophils, Absolute 0.00 10*3/mm3      nRBC 0.1 /100 WBC     POC Glucose  Once [[id number]]  (Abnormal) Collected: 10/08/20 1113    Specimen: Blood Updated: 10/08/20 1115     Glucose 151 mg/dL      Comment: Serial Number: [id number]:  [id number]       POC Glucose Once [[id number]]  (Abnormal) Collected: 10/08/20 0741    Specimen: Blood Updated: 10/08/20 0743     Glucose 129 mg/dL      Comment: Serial Number: [id number]:  [id number]       CBC & Differential [[id number]]  (Abnormal) Collected: 10/08/20 0541    Specimen: Blood Updated: 10/08/20 0704    Narrative:      The following orders were created for panel order CBC & Differential.  Procedure                               Abnormality         Status                     ---------                               -----------         ------                     CBC Auto Differential[[id number]]        Abnormal            Final result                 Please view results for these tests on the individual orders.    CBC Auto Differential [[id number]]  (Abnormal) Collected: 10/08/20 0541    Specimen: Blood Updated: 10/08/20 0704     WBC 12.90 10*3/mm3      RBC 2.91 10*6/mm3      Hemoglobin 10.2 g/dL      Hematocrit 30.4 %      .5 fL      MCH 34.9 pg      MCHC 33.4 g/dL      RDW 13.6 %      RDW-SD 50.3 fl      MPV 8.0 fL      Platelets 239 10*3/mm3     Scan Slide [[id number]] Collected: 10/08/20 0541    Specimen: Blood Updated: 10/08/20 0704     Scan Slide --     Comment: See Manual Differential Results       Manual Differential [[id number]]  (Abnormal) Collected: 10/08/20 0541    Specimen: Blood Updated: 10/08/20 0704     Neutrophil % 46.0 %      Lymphocyte % 7.0 %      Monocyte % 6.0 %      Bands %  37.0 %      Metamyelocyte % 4.0 %      Neutrophils Absolute 10.71 10*3/mm3      Lymphocytes Absolute 0.90 10*3/mm3      Monocytes Absolute 0.77 10*3/mm3      [place] Cells Slight/1+     Macrocytes Slight/1+     WBC Morphology Normal     Platelet Morphology Normal    Phosphorus [[id number]]  (Abnormal) Collected: 10/08/20 0541    Specimen: Blood  Updated: 10/08/20 0629     Phosphorus 2.2 mg/dL     Magnesium [[id number]]  (Abnormal) Collected: 10/08/20 0541    Specimen: Blood Updated: 10/08/20 0629     Magnesium 3.1 mg/dL     BUN [[id number]]  (Abnormal) Collected: 10/08/20 0541    Specimen: Blood Updated: 10/08/20 0629     BUN 25 mg/dL     Comprehensive Metabolic Panel [[id number]]  (Abnormal) Collected: 10/08/20 0541    Specimen: Blood Updated: 10/08/20 0628     Glucose 187 mg/dL      BUN --     Comment: Testing performed by alternate method        Creatinine 2.15 mg/dL      Sodium 142 mmol/L      Potassium 3.7 mmol/L      Chloride 113 mmol/L      CO2 14.0 mmol/L      Calcium 7.8 mg/dL      Total Protein 5.0 g/dL      Albumin 3.00 g/dL      ALT (SGPT) 59 U/L      AST (SGOT) 119 U/L      Alkaline Phosphatase 53 U/L      Total Bilirubin 0.4 mg/dL      eGFR Non African Amer 29 mL/min/1.73      Globulin 2.0 gm/dL      A/G Ratio 1.5 g/dL      BUN/Creatinine Ratio --     Comment: Testing not performed        Anion Gap 15.0 mmol/L     Narrative:      GFR Normal >60  Chronic Kidney Disease <60  Kidney Failure <15      CK [[id number]]  (Abnormal) Collected: 10/08/20 0541    Specimen: Blood Updated: 10/08/20 0621     Creatine Kinase 1,515 U/L     Calcium, Ionized [[id number]]  (Abnormal) Collected: 10/08/20 0541    Specimen: Blood Updated: 10/08/20 0615     Ionized Calcium 1.14 mmol/L     Lactic Acid, Plasma [[id number]]  (Abnormal) Collected: 10/08/20 0541    Specimen: Blood Updated: 10/08/20 0614     Lactate 5.5 mmol/L     aPTT [[id number]]  (Normal) Collected: 10/08/20 0541    Specimen: Blood Updated: 10/08/20 0607     PTT 26.6 seconds     POC Glucose Once [[id number]]  (Abnormal) Collected: 10/08/20 0546    Specimen: Blood Updated: 10/08/20 0547     Glucose 174 mg/dL      Comment: Serial Number: [id number]:  [id number]       POC Glucose Once [[id number]]  (Abnormal) Collected: 10/08/20 0458    Specimen: Blood Updated: 10/08/20 0459     Glucose 189 mg/dL       Comment: Serial Number: [id number]:  [id number]       CK [[id number]]  (Abnormal) Collected: 10/08/20 0034    Specimen: Blood Updated: 10/08/20 0411     Creatine Kinase 1,266 U/L     POC Glucose Once [[id number]]  (Abnormal) Collected: 10/08/20 0402    Specimen: Blood Updated: 10/08/20 0402     Glucose 226 mg/dL      Comment: Serial Number: [id number]:  [id number]       Blood Gas, Arterial [[id number]]  (Abnormal) Collected: 10/08/20 0317    Specimen: Arterial Blood Updated: 10/08/20 0322     Site Right Radial     Gary's Test Positive     pH, Arterial 7.345 pH units      pCO2, Arterial 28.2 mm Hg      pO2, Arterial 372.3 mm Hg      HCO3, Arterial 15.4 mmol/L      Base Excess, Arterial -9.1 mmol/L      Comment: Serial Number: [id number]:  [id number]        O2 Saturation, Arterial 100.0 %      CO2 Content 16.3 mmol/L      Barometric Pressure for Blood Gas --     Comment: N/A        Modality Adult Vent     FIO2 60 %      Ventilator Mode ;AC     Set Tidal Volume 700     PEEP 5     Hemodilution No     Respiratory Rate 18    POC Glucose Once [[id number]]  (Abnormal) Collected: 10/08/20 0258    Specimen: Blood Updated: 10/08/20 0259     Glucose 228 mg/dL      Comment: Serial Number: [id number]:  [id number]       POC Glucose Once [[id number]]  (Abnormal) Collected: 10/08/20 0204    Specimen: Blood Updated: 10/08/20 0205     Glucose 236 mg/dL      Comment: Serial Number: [id number]:  [id number]       BUN [[id number]]  (Normal) Collected: 10/08/20 0034    Specimen: Blood Updated: 10/08/20 0127     BUN 23 mg/dL     Troponin [[id number]]  (Abnormal) Collected: 10/08/20 0034    Specimen: Blood Updated: 10/08/20 0127     Troponin T 1.960 ng/mL     Narrative:      Troponin T Reference Range:  <= 0.03 ng/mL-   Negative for AMI  >0.03 ng/mL-     Abnormal for myocardial necrosis.  Clinicians would have to utilize clinical acumen, EKG, Troponin and serial changes to determine if it is an Acute Myocardial Infarction or  myocardial injury due to an underlying chronic condition.       Results may be falsely decreased if patient taking Biotin.      Comprehensive Metabolic Panel [[id number]]  (Abnormal) Collected: 10/08/20 0034    Specimen: Blood Updated: 10/08/20 0123     Glucose 280 mg/dL      BUN --     Comment: Testing performed by alternate method        Creatinine 1.98 mg/dL      Sodium 142 mmol/L      Potassium 2.8 mmol/L      Chloride 108 mmol/L      CO2 13.0 mmol/L      Calcium 8.4 mg/dL      Total Protein 5.7 g/dL      Albumin 3.40 g/dL      ALT (SGPT) 62 U/L      AST (SGOT) 124 U/L      Alkaline Phosphatase 63 U/L      Total Bilirubin 0.7 mg/dL      eGFR Non African Amer 32 mL/min/1.73      Globulin 2.3 gm/dL      A/G Ratio 1.5 g/dL      BUN/Creatinine Ratio --     Comment: Testing not performed        Anion Gap 21.0 mmol/L     Narrative:      GFR Normal >60  Chronic Kidney Disease <60  Kidney Failure <15      Magnesium [[id number]]  (Normal) Collected: 10/08/20 0034    Specimen: Blood Updated: 10/08/20 0123     Magnesium 1.7 mg/dL     Phosphorus [[id number]]  (Normal) Collected: 10/08/20 0034    Specimen: Blood Updated: 10/08/20 0123     Phosphorus 3.0 mg/dL     Lactic Acid, Plasma [[id number]]  (Abnormal) Collected: 10/08/20 0034    Specimen: Blood Updated: 10/08/20 0116     Lactate 7.8 mmol/L     CBC & Differential [[id number]]  (Abnormal) Collected: 10/08/20 0034    Specimen: Blood Updated: 10/08/20 0111    Narrative:      The following orders were created for panel order CBC & Differential.  Procedure                               Abnormality         Status                     ---------                               -----------         ------                     CBC Auto Differential[[id number]]        Abnormal            Final result                 Please view results for these tests on the individual orders.    CBC Auto Differential [[id number]]  (Abnormal) Collected: 10/08/20 0034    Specimen: Blood Updated: 10/08/20 0111       WBC 23.00 10*3/mm3      RBC 3.08 10*6/mm3      Hemoglobin 10.9 g/dL      Hematocrit 32.6 %      .7 fL      MCH 35.3 pg      MCHC 33.4 g/dL      RDW 13.8 %      RDW-SD 50.8 fl      MPV 8.1 fL      Platelets 311 10*3/mm3     Scan Slide [[id number]] Collected: 10/08/20 0034    Specimen: Blood Updated: 10/08/20 0111     Scan Slide --     Comment: See Manual Differential Results       Manual Differential [[id number]]  (Abnormal) Collected: 10/08/20 0034    Specimen: Blood Updated: 10/08/20 0111     Neutrophil % 70.0 %      Lymphocyte % 4.0 %      Monocyte % 3.0 %      Bands %  22.0 %      Metamyelocyte % 1.0 %      Neutrophils Absolute 21.16 10*3/mm3      Lymphocytes Absolute 0.92 10*3/mm3      Monocytes Absolute 0.69 10*3/mm3      Anisocytosis Slight/1+     Poikilocytes Slight/1+     Toxic Granulation Slight/1+     Large Platelets Slight/1+    Calcium, Ionized [[id number]]  (Abnormal) Collected: 10/08/20 0034    Specimen: Blood Updated: 10/08/20 0106     Ionized Calcium 1.17 mmol/L     aPTT [[id number]]  (Normal) Collected: 10/08/20 0034    Specimen: Blood Updated: 10/08/20 0100     PTT 24.3 seconds     Respiratory Panel PCR w/COVID-19(SARS-CoV-2) [facility] In-House, NP Swab in UTM/VTM, 3-4 HR TAT - Swab, Nasopharynx [[id number]]  (Normal) Collected: 10/07/20 2015    Specimen: Swab from Nasopharynx Updated: 10/07/20 2210     ADENOVIRUS, PCR Not Detected     Coronavirus 229E Not Detected     Coronavirus HKU1 Not Detected     Coronavirus NL63 Not Detected     Coronavirus OC43 Not Detected     COVID19 Not Detected     Human Metapneumovirus Not Detected     Human Rhinovirus/Enterovirus Not Detected     Influenza A PCR Not Detected     Influenza A H1 Not Detected     Influenza A H1 2009 PCR Not Detected     Influenza A H3 Not Detected     Influenza B PCR Not Detected     Parainfluenza Virus 1 Not Detected     Parainfluenza Virus 2 Not Detected     Parainfluenza Virus 3 Not Detected     Parainfluenza Virus  4 Not Detected     RSV, PCR Not Detected     Bordetella pertussis pcr Not Detected     Bordetella parapertussis PCR Not Detected     Chlamydophila pneumoniae PCR Not Detected     Mycoplasma pneumo by PCR Not Detected    Narrative:      Fact sheet for providers: https://docs.[employer]/wp-content/uploads/ERA7505-5051-QB5.1-EUA-Provider-Fact-Sheet-3.pdf    Fact sheet for patients: https://docs.[employer]/wp-content/uploads/KBJ5677-5886-WL1.1-EUA-Patient-Fact-Sheet-1.pdf    MRSA Screen, PCR (Inpatient) - Swab, Nares [[id number]]  (Normal) Collected: 10/07/20 2015    Specimen: Swab from Nares Updated: 10/07/20 2154     MRSA PCR No MRSA Detected    CK [[id number]]  (Abnormal) Collected: 10/07/20 1931    Specimen: Blood Updated: 10/07/20 2115     Creatine Kinase 362 U/L     Lactic Acid, Reflex Timer (This will reflex a repeat order 3-3:15 hours after ordered.) [[id number]] Collected: 10/07/20 1723    Specimen: Blood Updated: 10/07/20 2045     Hold Tube Hold for add-ons.     Comment: Auto resulted.       BUN [[id number]]  (Normal) Collected: 10/07/20 1931    Specimen: Blood Updated: 10/07/20 2026     BUN 21 mg/dL     Troponin [[id number]]  (Abnormal) Collected: 10/07/20 1931    Specimen: Blood Updated: 10/07/20 2025     Troponin T 2.010 ng/mL     Narrative:      Troponin T Reference Range:  <= 0.03 ng/mL-   Negative for AMI  >0.03 ng/mL-     Abnormal for myocardial necrosis.  Clinicians would have to utilize clinical acumen, EKG, Troponin and serial changes to determine if it is an Acute Myocardial Infarction or myocardial injury due to an underlying chronic condition.       Results may be falsely decreased if patient taking Biotin.      Lactic Acid, Plasma [[id number]]  (Abnormal) Collected: 10/07/20 1931    Specimen: Blood Updated: 10/07/20 2025     Lactate 5.7 mmol/L     Comprehensive Metabolic Panel [[id number]]  (Abnormal) Collected: 10/07/20 1931    Specimen: Blood Updated: 10/07/20 2013     Glucose 178 mg/dL      BUN  --     Comment: Testing performed by alternate method        Creatinine 1.77 mg/dL      Sodium 143 mmol/L      Potassium 3.4 mmol/L      Comment: Slight hemolysis detected by analyzer. Results may be affected.        Chloride 108 mmol/L      CO2 16.0 mmol/L      Calcium 8.1 mg/dL      Total Protein 6.6 g/dL      Albumin 3.80 g/dL      ALT (SGPT) 66 U/L      AST (SGOT) 110 U/L      Alkaline Phosphatase 80 U/L      Total Bilirubin 0.8 mg/dL      eGFR Non African Amer 37 mL/min/1.73      Globulin 2.8 gm/dL      A/G Ratio 1.4 g/dL      BUN/Creatinine Ratio --     Comment: Testing not performed        Anion Gap 19.0 mmol/L     Narrative:      GFR Normal >60  Chronic Kidney Disease <60  Kidney Failure <15      Magnesium [[id number]]  (Normal) Collected: 10/07/20 1931    Specimen: Blood Updated: 10/07/20 2013     Magnesium 2.0 mg/dL     Phosphorus [[id number]]  (Normal) Collected: 10/07/20 1931    Specimen: Blood Updated: 10/07/20 2013     Phosphorus 2.6 mg/dL     aPTT [[id number]]  (Abnormal) Collected: 10/07/20 1931    Specimen: Blood Updated: 10/07/20 1951     PTT 21.9 seconds     Protime-INR [[id number]]  (Abnormal) Collected: 10/07/20 1931    Specimen: Blood Updated: 10/07/20 1951     Protime 12.4 Seconds      INR 1.13    Calcium, Ionized [[id number]]  (Abnormal) Collected: 10/07/20 1931    Specimen: Blood Updated: 10/07/20 1951     Ionized Calcium 1.09 mmol/L     Blood Culture - Blood, Hand, Right [[id number]] Collected: 10/07/20 1931    Specimen: Blood from Hand, Right Updated: 10/07/20 1950    Blood Culture - Blood, Hand, Right [[id number]] Collected: 10/07/20 1931    Specimen: Blood from Hand, Right Updated: 10/07/20 1950    Procalcitonin [[id number]]  (Normal) Collected: 10/07/20 1732    Specimen: Blood Updated: 10/07/20 1913     Procalcitonin 0.05 ng/mL     Narrative:      As a Marker for Sepsis (Non-Neonates):   1. <0.5 ng/mL represents a low risk of severe sepsis and/or septic shock.  1. >2 ng/mL represents a high  "risk of severe sepsis and/or septic shock.    As a Marker for Lower Respiratory Tract Infections that require antibiotic therapy:  PCT on Admission     Antibiotic Therapy             6-12 Hrs later  > 0.5                Strongly Recommended            >0.25 - <0.5         Recommended  0.1 - 0.25           Discouraged                   Remeasure/reassess PCT  <0.1                 Strongly Discouraged          Remeasure/reassess PCT      As 28 day mortality risk marker: \"Change in Procalcitonin Result\" (> 80 % or <=80 %) if Day 0 (or Day 1) and Day 4 values are available. Refer to [email or web address]   Change in PCT <=80 %   A decrease of PCT levels below or equal to 80 % defines a positive change in PCT test result representing a higher risk for 28-day all-cause mortality of patients diagnosed with severe sepsis or septic shock.  Change in PCT > 80 %   A decrease of PCT levels of more than 80 % defines a negative change in PCT result representing a lower risk for 28-day all-cause mortality of patients diagnosed with severe sepsis or septic shock.                Results may be falsely decreased if patient taking Biotin.     CK Total & CKMB [[id number]]  (Abnormal) Collected: 10/07/20 1732    Specimen: Blood Updated: 10/07/20 1906     CKMB 11.18 ng/mL      Creatine Kinase 164 U/L     Narrative:      CKMB results may be falsely decreased if patient taking Biotin.    Extra Tubes [[id number]] Collected: 10/07/20 1732    Specimen: Blood, Venous Line Updated: 10/07/20 1845    Narrative:      The following orders were created for panel order Extra Tubes.  Procedure                               Abnormality         Status                     ---------                               -----------         ------                     Gold Top - SST[[id number]]                                   Final result                 Please view results for these tests on the individual orders.    Gold Top - SST [[id number]] " Collected: 10/07/20 1732    Specimen: Blood Updated: 10/07/20 1845     Extra Tube Hold for add-ons.     Comment: Auto resulted.       BUN [[id number]]  (Normal) Collected: 10/07/20 1732    Specimen: Blood Updated: 10/07/20 1812     BUN 18 mg/dL     Troponin [[id number]]  (Abnormal) Collected: 10/07/20 1732    Specimen: Blood Updated: 10/07/20 1810     Troponin T 0.718 ng/mL     Narrative:      Troponin T Reference Range:  <= 0.03 ng/mL-   Negative for AMI  >0.03 ng/mL-     Abnormal for myocardial necrosis.  Clinicians would have to utilize clinical acumen, EKG, Troponin and serial changes to determine if it is an Acute Myocardial Infarction or myocardial injury due to an underlying chronic condition.       Results may be falsely decreased if patient taking Biotin.      Scan Slide [[id number]] Collected: 10/07/20 1732    Specimen: Blood Updated: 10/07/20 1810     Scan Slide --     Comment: See Manual Differential Results       Manual Differential [[id number]]  (Abnormal) Collected: 10/07/20 1732    Specimen: Blood Updated: 10/07/20 1810     Neutrophil % 64.0 %      Lymphocyte % 10.0 %      Monocyte % 1.0 %      Bands %  25.0 %      Neutrophils Absolute 18.07 10*3/mm3      Lymphocytes Absolute 2.03 10*3/mm3      Monocytes Absolute 0.20 10*3/mm3      Anisocytosis Slight/1+     Poikilocytes Slight/1+     Toxic Granulation Slight/1+     Large Platelets Slight/1+    CBC & Differential [[id number]]  (Abnormal) Collected: 10/07/20 1732    Specimen: Blood Updated: 10/07/20 1810    Narrative:      The following orders were created for panel order CBC & Differential.  Procedure                               Abnormality         Status                     ---------                               -----------         ------                     CBC Auto Differential[[id number]]        Abnormal            Final result                 Please view results for these tests on the individual orders.    CBC Auto Differential [[id number]]   (Abnormal) Collected: 10/07/20 1732    Specimen: Blood Updated: 10/07/20 1810     WBC 20.30 10*3/mm3      RBC 3.21 10*6/mm3      Hemoglobin 11.2 g/dL      Hematocrit 33.7 %      .8 fL      MCH 34.8 pg      MCHC 33.2 g/dL      RDW 13.6 %      RDW-SD 49.4 fl      MPV 8.2 fL      Platelets 336 10*3/mm3     Basic Metabolic Panel [[id number]]  (Abnormal) Collected: 10/07/20 1732    Specimen: Blood Updated: 10/07/20 1807     Glucose 169 mg/dL      BUN --     Comment: Testing performed by alternate method        Creatinine 1.50 mg/dL      Sodium 144 mmol/L      Potassium 3.9 mmol/L      Chloride 106 mmol/L      CO2 15.0 mmol/L      Calcium 8.1 mg/dL      eGFR Non African Amer 45 mL/min/1.73      BUN/Creatinine Ratio --     Comment: Testing not performed        Anion Gap 23.0 mmol/L     Narrative:      GFR Normal >60  Chronic Kidney Disease <60  Kidney Failure <15      POC Lactate [[id number]]  (Abnormal) Collected: 10/07/20 1723    Specimen: Blood Updated: 10/07/20 1734     Lactate 9.6 mmol/L      Comment: Serial Number: [id number]  [id number]       POCT Electrolytes +HGB +HCT [[id number]]  (Abnormal) Collected: 10/07/20 1723    Specimen: Blood Updated: 10/07/20 1732     Sodium 144 mmol/L      POC Potassium 3.8 mmol/L      Ionized Calcium 0.88 mmol/L      Comment: Serial Number: [id number]:  [id number]        Glucose 159 mg/dL      Hematocrit 32 %      Hemoglobin 10.8 g/dL     Blood Gas, Arterial [[id number]]  (Abnormal) Collected: 10/07/20 1723    Specimen: Arterial Blood Updated: 10/07/20 1730     Site Right Radial     Gary's Test Positive     pH, Arterial 7.453 pH units      pCO2, Arterial 25.3 mm Hg      pO2, Arterial 478.4 mm Hg      HCO3, Arterial 17.7 mmol/L      Base Excess, Arterial -5.0 mmol/L      Comment: Serial Number: [id number]:  [id number]        O2 Saturation, Arterial 100.0 %      CO2 Content 18.5 mmol/L      Barometric Pressure for Blood Gas --     Comment: N/A        Modality Adult Vent     FIO2 100 %       Ventilator Mode ;AC     Set Tidal Volume 700     PEEP 5     Hemodilution No     Respiratory Rate 18    POC Glucose Once [[id number]]  (Abnormal) Collected: 10/07/20 1723    Specimen: Blood Updated: 10/07/20 1730     Glucose 159 mg/dL      Comment: Serial Number: [id number]:  [id number]             Imaging Results (Last 24 Hours)     Procedure Component Value Units Date/Time    XR Chest 1 View [[id number]] Resulted: 10/08/20 1356     Updated: 10/08/20 1356    XR Chest 1 View [[id number]] Collected: 10/08/20 0825     Updated: 10/08/20 0840    Narrative:         DATE OF EXAM:   10/8/2020 4:19 AM     PROCEDURE:   XR CHEST 1 VW-     INDICATIONS:   SOA; I46.9-Cardiac arrest, cause unspecified; I49.01-Ventricular  fibrillation     COMPARISON:  10/7/2020     TECHNIQUE:   Portable chest radiograph.     FINDINGS:    Endotracheal tube tip 15 mm above the ting. The patient slightly  rotated to the right. Esophagogastric tube below the diaphragm. Left IJ  central venous catheter tip overlies the upper SVC. Stable cardiomegaly.  No pneumothorax. No consolidation or pleural effusion.       Impression:      1. Placement of an esophagogastric tube with the tip below the  diaphragm.   2. Stable ET tube and left IJ central venous catheter.  3. Cardiomegaly.  4. Clear lungs.     Electronically Signed By-Tom Paiz On:10/8/2020 8:26 AM  This report was finalized on [id number] by  Tom Pazi, .    XR Abdomen KUB [[id number]] Collected: 10/07/20 2155     Updated: 10/07/20 2157    Narrative:      DATE OF EXAM:  10/7/2020 8:52 PM     PROCEDURE:  XR ABDOMEN KUB-     INDICATIONS:  NG placement; I46.9-Cardiac arrest, cause unspecified;  I49.01-Ventricular fibrillation     COMPARISON:  No Comparisons Available     TECHNIQUE:   Single radiographic view of the abdomen was obtained.        FINDINGS:  There is a nasogastric tube noted with its tip in the left quadrant of  the abdomen in the area of the stomach.        Impression:       1.Nasogastric tube tip is within the stomach.     Electronically Signed By-Eduardo Pratt On:10/7/2020 9:55 PM  This report was finalized on [id number] by  Eduardo Pratt, .    XR Chest 1 View [[id number]] Collected: 10/07/20 2152     Updated: 10/07/20 2157    Narrative:      DATE OF EXAM:  10/7/2020 8:46 PM     PROCEDURE:  XR CHEST 1 VW-     INDICATIONS:  central line; I46.9-Cardiac arrest, cause unspecified;  I49.01-Ventricular fibrillation     COMPARISON:  10/07/2020     TECHNIQUE:   Single radiographic AP view of the chest was obtained.     FINDINGS:  An ET tube is suggested with its tip above the ting. There is a left  IJ line noted with its tip at the junction of the brachiocephalic vein  with the superior vena cava. There is no definite pneumothorax. The  heart is enlarged. Lungs seem relatively clear.        Impression:      1.Cardiomegaly  2.No definite infiltrates.  3.New left IJ line is noted.  4.There is an ET tube present. The tip is just above the ting. It may  be pulled back 2.5 cm for more optimal positioning.     Electronically Signed By-Eduardo Pratt On:10/7/2020 9:55 PM  This report was finalized on [id number] by  Eduardo Pratt, .    CT Head Without Contrast [[id number]] Collected: 10/07/20 1849     Updated: 10/07/20 1853    Narrative:         DATE OF EXAM:  10/7/2020 6:37 PM     PROCEDURE:   CT HEAD WO CONTRAST-     INDICATIONS:   Mental status change, unknown cause; I46.9-Cardiac arrest, cause  unspecified; I49.01-Ventricular fibrillation     COMPARISON:  No Comparisons Available     TECHNIQUE:   Routine transaxial cuts were obtained through the head without the  administration of contrast. Automated exposure control and iterative  reconstruction methods were used.      FINDINGS:  There are paranasal sinus changes that could relate to intubation. There  is some suggested cerebral atrophy. There is no underlying hemorrhage.  There is no midline shift. There are no abnormal extra-axial  fluid  collections. There is atherosclerotic changes involving the vertebral  arteries and carotid siphon.        Impression:      1.No acute intracranial abnormality.  2.There is some cerebral atrophy.  3.Atherosclerotic changes are present.  4.There is an appearance to the paranasal sinuses which could relate to  intubation.     At some point MR may be of benefit in further workup of this patient as  thought clinically indicated.     Electronically Signed ByJames Pratt On:10/7/2020 6:51 PM  This report was finalized on [id number] by  Eduardo Pratt, .    XR Chest 1 View [[id number]] Collected: 10/07/20 1848     Updated: 10/07/20 1851    Narrative:      DATE OF EXAM:  10/7/2020 6:37 PM     PROCEDURE:  XR CHEST 1 VW-     INDICATIONS:  Cardiac arrest; I46.9-Cardiac arrest, cause unspecified;  I49.01-Ventricular fibrillation     COMPARISON:  No Comparisons Available     TECHNIQUE:   Single radiographic AP view of the chest was obtained.     FINDINGS:  Extraneous objects are seen overlying the chest obscuring the lungs to  some degree. The heart looks enlarged. There is an ET tube is suggested  with its tip just above the ting. This could be pulled back 1.5 cm for  more optimal positioning. The visualized lungs look relatively clear.  There are no pleural effusions.        Impression:      1.No definite acute pulmonary process.  2.Cardiomegaly  3.ET tube could be pulled back slightly for more optimal positioning.     Electronically Signed ByJames Pratt On:10/7/2020 6:49 PM  This report was finalized on [id number] by  Eduardo Pratt, .          EKG      I personally viewed and interpreted the patient's EKG/Telemetry data:    ECHOCARDIOGRAM:      STRESS MYOVIEW:    CARDIAC CATHETERIZATION:    OTHER:         Assessment/Plan     Active Problems:    Mixed hyperlipidemia    HTN (hypertension)    CAD (coronary artery disease)    Gastroesophageal reflux disease    Cardiac arrest (CMS/HCC)    Acute respiratory failure  (CMS/[facility]AC, located within [facility])    VALERIE (acute kidney injury) (CMS/[facility]AC, located within [facility])    Elevated troponin    Leukocytosis    Septic shock (CMS/[facility]AC, located within [facility])    Hypocalcemia    Anemia    Unresponsive      Patient presented with cardiac arrest and had V. fib at presentation and was resuscitated with cardioversion  Patient is currently intubated and sedated  Patient also has hypoxic respiratory failure  Patient is on multiple vasopressors  Patient had an echocardiogram which showed EF of about 30 to 35%  Patient is unresponsive and probably has anoxic brain injury and hence is on hypothermia protocol  Neurology will eventually evaluate  Patient has borderline blood troponin which could be secondary to resuscitation efforts and demand ischemia  Patient also has had increased lactate which could be septic shock and hence he is on IV antibiotics and fluids but will back of the fluids because of his congestive heart failure  Patient also developed acute renal failure and is on renal consultation    I discussed the patients findings and my recommendations with patient's family    Elliott Rapp MD  10/08/20  16:54 EDT

## 2020-10-08 NOTE — PLAN OF CARE
Goal Outcome Evaluation:   Beginning of shift pt remained very hypertensive with pressures 200/90s, started NTG & planned to add on Cardene until pt then became very hypotensive with pressures 80-90/40-50s. Levo restarted to maintain MAP >60mmhg. Pt made <75ml of urine throughout shift despite 40 IV lasix. Hypothermia protocol initiated with target temp of 34 C achieved at 2300. Pt received 60 of K overnight and 4g mag. Daughter and son both called and requested to see the patient, aware of visitation hours.

## 2020-10-09 NOTE — PLAN OF CARE
Goal Outcome Evaluation: rewarming began @ 2300 10/08/2020 . 1 noted incidence of fine focal seizure noted @ 2100. None since. Remains on Min sedation of Propofol and Fentanyl. Bicarb drip, insulin & 1 mcg Dopamine. BIS @ 12

## 2020-10-09 NOTE — PROGRESS NOTES
"Referring Provider: Intensivist    Reason for follow-up: Post cardiac arrest     Patient Care Team:  Hawa Estevez, NP as PCP - General (Nurse Practitioner)    Subjective .  Intubated  Objective  Intubated     Review of Systems   Unable to perform ROS: intubated       Patient has no known allergies.    Scheduled Meds:fosphenytoin, 200 mg PE, Intravenous, Q12H  heparin (porcine), 5,000 Units, Subcutaneous, Q12H  insulin lispro, 0-7 Units, Subcutaneous, Q6H  pantoprazole, 40 mg, Intravenous, Q AM  sodium chloride, 30 mL/kg, Intravenous, Once  sodium chloride, 10 mL, Intravenous, Q12H      Continuous Infusions:DOPamine, 2-20 mcg/kg/min, Last Rate: 1 mcg/kg/min (10/09/20 0130)  propofol, 5-50 mcg/kg/min, Last Rate: 25 mcg/kg/min (10/09/20 0813)  sodium bicarbonate drip (greater than 75 mEq/bag), 75 mEq      PRN Meds:.•  dextrose  •  dextrose  •  glucagon (human recombinant)  •  insulin lispro **AND** insulin lispro  •  magnesium sulfate  •  ondansetron  •  propofol  •  [COMPLETED] Insert peripheral IV **AND** sodium chloride  •  sodium chloride        VITAL SIGNS  Vitals:    10/09/20 0938 10/09/20 0949 10/09/20 1009 10/09/20 1200   BP: 127/52      Pulse:  77 78    Resp:       Temp:    98.6 °F (37 °C)   TempSrc:    Rectal   SpO2:  98% 99%    Weight:       Height:           Flowsheet Rows      First Filed Value   Admission Height  182.9 cm (72\") Documented at 10/07/2020 1720   Admission Weight  81.6 kg (179 lb 14.3 oz) Documented at 10/07/2020 1720           TELEMETRY: Sinus rhythm    Physical Exam:  Constitutional:       Appearance: Well-developed.   Eyes:      General: No scleral icterus.  HENT:      Head: Normocephalic and atraumatic.   Neck:      Vascular: No carotid bruit or JVD.   Pulmonary:      Effort: Pulmonary effort is normal.      Breath sounds: Normal breath sounds. No wheezing. No rales.   Cardiovascular:      Normal rate. Regular rhythm.   Pulses:     Intact distal pulses.   Abdominal:      General: " Bowel sounds are normal.      Palpations: Abdomen is soft.   Musculoskeletal: Normal range of motion.   Skin:     General: Skin is warm and dry.      Findings: No rash.   Neurological:      Comments: Intubated   Psychiatric:      Comments: Intubated          Results Review:   I reviewed the patient's new clinical results.  Lab Results (last 24 hours)     Procedure Component Value Units Date/Time    POC Glucose Once [[id number]]  (Normal) Collected: 10/09/20 1142    Specimen: Blood Updated: 10/09/20 1152     Glucose 87 mg/dL      Comment: Serial Number: [id number]:  [id number]       POC Glucose Once [[id number]]  (Normal) Collected: 10/09/20 1002    Specimen: Blood Updated: 10/09/20 1004     Glucose 86 mg/dL      Comment: Serial Number: [id number]:  [id number]       POC Glucose Once [[id number]]  (Normal) Collected: 10/09/20 0909    Specimen: Blood Updated: 10/09/20 0910     Glucose 74 mg/dL      Comment: Serial Number: [id number]:  [id number]       POC Glucose Once [[id number]]  (Normal) Collected: 10/09/20 0831    Specimen: Blood Updated: 10/09/20 0909     Glucose 86 mg/dL      Comment: Serial Number: [id number]:  [id number]       POC Glucose Once [[id number]]  (Normal) Collected: 10/09/20 0730    Specimen: Blood Updated: 10/09/20 0832     Glucose 80 mg/dL      Comment: Serial Number: [id number]:  [id number]       POC Glucose Once [[id number]]  (Normal) Collected: 10/09/20 0552    Specimen: Blood Updated: 10/09/20 0730     Glucose 80 mg/dL      Comment: Serial Number: [id number]:  [id number]       BUN [[id number]]  (Abnormal) Collected: 10/09/20 0548    Specimen: Blood Updated: 10/09/20 0645     BUN 32 mg/dL     CK [[id number]]  (Abnormal) Collected: 10/09/20 0548    Specimen: Blood Updated: 10/09/20 0638     Creatine Kinase 443 U/L     Comprehensive Metabolic Panel [[id number]]  (Abnormal) Collected: 10/09/20 0548    Specimen: Blood Updated: 10/09/20 0637     Glucose 91 mg/dL      BUN --      Comment: Testing performed by alternate method        Creatinine 2.67 mg/dL      Sodium 139 mmol/L      Potassium 4.3 mmol/L      Chloride 106 mmol/L      CO2 20.0 mmol/L      Calcium 7.6 mg/dL      Total Protein 5.3 g/dL      Albumin 3.00 g/dL      ALT (SGPT) 53 U/L      AST (SGOT) 125 U/L      Alkaline Phosphatase 54 U/L      Total Bilirubin 0.4 mg/dL      eGFR Non African Amer 23 mL/min/1.73      Globulin 2.3 gm/dL      A/G Ratio 1.3 g/dL      BUN/Creatinine Ratio --     Comment: Testing not performed        Anion Gap 13.0 mmol/L     Narrative:      GFR Normal >60  Chronic Kidney Disease <60  Kidney Failure <15      Magnesium [[id number]]  (Normal) Collected: 10/09/20 0548    Specimen: Blood Updated: 10/09/20 0637     Magnesium 2.3 mg/dL     Phosphorus [[id number]]  (Normal) Collected: 10/09/20 0548    Specimen: Blood Updated: 10/09/20 0637     Phosphorus 4.5 mg/dL     Lactic Acid, Plasma [[id number]]  (Abnormal) Collected: 10/09/20 0548    Specimen: Blood Updated: 10/09/20 0637     Lactate 2.1 mmol/L     aPTT [[id number]]  (Normal) Collected: 10/09/20 0548    Specimen: Blood Updated: 10/09/20 0621     PTT 28.4 seconds     Calcium, Ionized [[id number]]  (Abnormal) Collected: 10/09/20 0548    Specimen: Blood Updated: 10/09/20 0618     Ionized Calcium 1.04 mmol/L     CBC & Differential [[id number]]  (Abnormal) Collected: 10/09/20 0548    Specimen: Blood Updated: 10/09/20 0605    Narrative:      The following orders were created for panel order CBC & Differential.  Procedure                               Abnormality         Status                     ---------                               -----------         ------                     CBC Auto Differential[[id number]]        Abnormal            Final result                 Please view results for these tests on the individual orders.    CBC Auto Differential [[id number]]  (Abnormal) Collected: 10/09/20 0548    Specimen: Blood Updated: 10/09/20 0605     WBC 8.20  10*3/mm3      RBC 3.11 10*6/mm3      Hemoglobin 10.9 g/dL      Hematocrit 31.9 %      .5 fL      MCH 35.2 pg      MCHC 34.3 g/dL      RDW 14.1 %      RDW-SD 50.8 fl      MPV 8.2 fL      Platelets 205 10*3/mm3      Neutrophil % 88.7 %      Lymphocyte % 5.6 %      Monocyte % 4.9 %      Eosinophil % 0.0 %      Basophil % 0.8 %      Neutrophils, Absolute 7.30 10*3/mm3      Lymphocytes, Absolute 0.50 10*3/mm3      Monocytes, Absolute 0.40 10*3/mm3      Eosinophils, Absolute 0.00 10*3/mm3      Basophils, Absolute 0.10 10*3/mm3      nRBC 0.1 /100 WBC     POC Glucose Once [[id number]]  (Normal) Collected: 10/09/20 0435    Specimen: Blood Updated: 10/09/20 0436     Glucose 85 mg/dL      Comment: Serial Number: [id number]:  [id number]       POC Glucose Once [[id number]]  (Normal) Collected: 10/09/20 0331    Specimen: Blood Updated: 10/09/20 0332     Glucose 91 mg/dL      Comment: Serial Number: [id number]:  [id number]       Blood Gas, Arterial [[id number]]  (Abnormal) Collected: 10/09/20 0325    Specimen: Arterial Blood Updated: 10/09/20 0328     Site Arterial Line     Gary's Test N/A     pH, Arterial 7.334 pH units      pCO2, Arterial 41.9 mm Hg      pO2, Arterial 97.6 mm Hg      HCO3, Arterial 22.3 mmol/L      Base Excess, Arterial -3.5 mmol/L      Comment: Serial Number: [id number]:  [id number]        O2 Saturation, Arterial 97.1 %      CO2 Content 23.5 mmol/L      Barometric Pressure for Blood Gas --     Comment: N/A        Modality Adult Vent     FIO2 60 %      Ventilator Mode ;AC     Set Tidal Volume 480     PEEP 5     Hemodilution No     Respiratory Rate 18    POC Glucose Once [[id number]]  (Normal) Collected: 10/09/20 0242    Specimen: Blood Updated: 10/09/20 0243     Glucose 100 mg/dL      Comment: Serial Number: [id number]:  [id number]       POC Glucose Once [[id number]]  (Normal) Collected: 10/09/20 0125    Specimen: Blood Updated: 10/09/20 0126     Glucose 97 mg/dL      Comment: Serial Number:  [id number]:  [id number]       BUN [[id number]]  (Abnormal) Collected: 10/08/20 2352    Specimen: Blood Updated: 10/09/20 0044     BUN 31 mg/dL     Phosphorus [[id number]]  (Abnormal) Collected: 10/08/20 2352    Specimen: Blood Updated: 10/09/20 0037     Phosphorus 5.1 mg/dL     Comprehensive Metabolic Panel [[id number]]  (Abnormal) Collected: 10/08/20 2352    Specimen: Blood Updated: 10/09/20 0035     Glucose 106 mg/dL      BUN --     Comment: Testing performed by alternate method        Creatinine 2.53 mg/dL      Sodium 142 mmol/L      Potassium 3.5 mmol/L      Chloride 106 mmol/L      CO2 21.0 mmol/L      Calcium 7.9 mg/dL      Total Protein 5.7 g/dL      Albumin 3.30 g/dL      ALT (SGPT) 59 U/L      AST (SGOT) 124 U/L      Alkaline Phosphatase 58 U/L      Total Bilirubin 0.4 mg/dL      eGFR Non African Amer 24 mL/min/1.73      Globulin 2.4 gm/dL      A/G Ratio 1.4 g/dL      BUN/Creatinine Ratio --     Comment: Testing not performed        Anion Gap 15.0 mmol/L     Narrative:      GFR Normal >60  Chronic Kidney Disease <60  Kidney Failure <15      CK [[id number]]  (Abnormal) Collected: 10/08/20 2352    Specimen: Blood Updated: 10/09/20 0035     Creatine Kinase 649 U/L     Magnesium [[id number]]  (Abnormal) Collected: 10/08/20 2352    Specimen: Blood Updated: 10/09/20 0035     Magnesium 2.6 mg/dL     Lactic Acid, Plasma [[id number]]  (Normal) Collected: 10/08/20 2352    Specimen: Blood Updated: 10/09/20 0027     Lactate 1.6 mmol/L     Calcium, Ionized [[id number]]  (Abnormal) Collected: 10/08/20 2352    Specimen: Blood Updated: 10/09/20 0025     Ionized Calcium 1.11 mmol/L     aPTT [[id number]]  (Normal) Collected: 10/08/20 2352    Specimen: Blood Updated: 10/09/20 0014     PTT 28.1 seconds     CBC & Differential [[id number]]  (Abnormal) Collected: 10/08/20 2352    Specimen: Blood Updated: 10/09/20 0004    Narrative:      The following orders were created for panel order CBC & Differential.  Procedure                                Abnormality         Status                     ---------                               -----------         ------                     CBC Auto Differential[[id number]]        Abnormal            Final result                 Please view results for these tests on the individual orders.    CBC Auto Differential [[id number]]  (Abnormal) Collected: 10/08/20 2352    Specimen: Blood Updated: 10/09/20 0004     WBC 10.20 10*3/mm3      RBC 3.23 10*6/mm3      Hemoglobin 11.2 g/dL      Hematocrit 33.4 %      .6 fL      MCH 34.8 pg      MCHC 33.6 g/dL      RDW 13.8 %      RDW-SD 49.9 fl      MPV 8.0 fL      Platelets 212 10*3/mm3      Neutrophil % 87.6 %      Lymphocyte % 7.0 %      Monocyte % 4.4 %      Eosinophil % 0.0 %      Basophil % 1.0 %      Neutrophils, Absolute 8.90 10*3/mm3      Lymphocytes, Absolute 0.70 10*3/mm3      Monocytes, Absolute 0.40 10*3/mm3      Eosinophils, Absolute 0.00 10*3/mm3      Basophils, Absolute 0.10 10*3/mm3      nRBC 0.0 /100 WBC     POC Glucose Once [[id number]]  (Normal) Collected: 10/08/20 2328    Specimen: Blood Updated: 10/08/20 2329     Glucose 97 mg/dL      Comment: Serial Number: [id number]:  [id number]       POC Glucose Once [[id number]]  (Normal) Collected: 10/08/20 2204    Specimen: Blood Updated: 10/08/20 2205     Glucose 96 mg/dL      Comment: Serial Number: [id number]:  [id number]       Blood Gas, Arterial [[id number]]  (Abnormal) Collected: 10/08/20 2146    Specimen: Arterial Blood Updated: 10/08/20 2155     Site Arterial Line     Gary's Test N/A     pH, Arterial 7.287 pH units      pCO2, Arterial 45.5 mm Hg      pO2, Arterial 58.4 mm Hg      HCO3, Arterial 21.7 mmol/L      Base Excess, Arterial -4.8 mmol/L      Comment: Serial Number: [id number]:  [id number]        O2 Saturation, Arterial 86.4 %      CO2 Content 23.1 mmol/L      Barometric Pressure for Blood Gas --     Comment: N/A        Modality Adult Vent     FIO2 40 %      Ventilator Mode ;AC      Set Tidal Volume 480     PEEP 5     Hemodilution No     Respiratory Rate 18    POC Glucose Once [[id number]]  (Normal) Collected: 10/08/20 2130    Specimen: Blood Updated: 10/08/20 2131     Glucose 103 mg/dL      Comment: Serial Number: [id number]:  [id number]       POC Glucose Once [[id number]]  (Abnormal) Collected: 10/08/20 2013    Specimen: Blood Updated: 10/08/20 2015     Glucose 109 mg/dL      Comment: Serial Number: [id number]:  [id number]       Blood Culture - Blood, Hand, Right [[id number]] Collected: 10/07/20 1931    Specimen: Blood from Hand, Right Updated: 10/08/20 2000     Blood Culture No growth at 24 hours    Blood Culture - Blood, Hand, Right [[id number]] Collected: 10/07/20 1931    Specimen: Blood from Hand, Right Updated: 10/08/20 2000     Blood Culture No growth at 24 hours    BUN [[id number]]  (Abnormal) Collected: 10/08/20 1828    Specimen: Blood Updated: 10/08/20 1947     BUN 29 mg/dL     Scan Slide [[id number]] Collected: 10/08/20 1828    Specimen: Blood Updated: 10/08/20 1937     Scan Slide --     Comment: See Manual Differential Results       Manual Differential [[id number]]  (Abnormal) Collected: 10/08/20 1828    Specimen: Blood Updated: 10/08/20 1937     Neutrophil % 12.0 %      Lymphocyte % 6.0 %      Monocyte % 9.0 %      Bands %  72.0 %      Myelocyte % 1.0 %      Neutrophils Absolute 12.60 10*3/mm3      Lymphocytes Absolute 0.90 10*3/mm3      Monocytes Absolute 1.35 10*3/mm3      Anisocytosis Slight/1+     Poikilocytes Slight/1+     Toxic Granulation Slight/1+     Platelet Morphology Normal    CBC & Differential [[id number]]  (Abnormal) Collected: 10/08/20 1828    Specimen: Blood Updated: 10/08/20 1937    Narrative:      The following orders were created for panel order CBC & Differential.  Procedure                               Abnormality         Status                     ---------                               -----------         ------                     CBC Auto  Differential[[id number]]        Abnormal            Final result                 Please view results for these tests on the individual orders.    CBC Auto Differential [[id number]]  (Abnormal) Collected: 10/08/20 1828    Specimen: Blood Updated: 10/08/20 1937     WBC 15.00 10*3/mm3      RBC 3.14 10*6/mm3      Hemoglobin 11.2 g/dL      Hematocrit 32.7 %      .0 fL      MCH 35.7 pg      MCHC 34.3 g/dL      RDW 13.8 %      RDW-SD 49.9 fl      MPV 8.3 fL      Platelets 216 10*3/mm3     Phosphorus [[id number]]  (Normal) Collected: 10/08/20 1828    Specimen: Blood Updated: 10/08/20 1923     Phosphorus 3.7 mg/dL     Comprehensive Metabolic Panel [[id number]]  (Abnormal) Collected: 10/08/20 1828    Specimen: Blood Updated: 10/08/20 1918     Glucose 120 mg/dL      BUN --     Comment: Testing performed by alternate method        Creatinine 2.40 mg/dL      Sodium 142 mmol/L      Potassium 3.5 mmol/L      Chloride 107 mmol/L      CO2 20.0 mmol/L      Calcium 8.1 mg/dL      Total Protein 6.0 g/dL      Albumin 3.60 g/dL      ALT (SGPT) 63 U/L      AST (SGOT) 138 U/L      Alkaline Phosphatase 67 U/L      Total Bilirubin 0.4 mg/dL      eGFR Non African Amer 26 mL/min/1.73      Globulin 2.4 gm/dL      A/G Ratio 1.5 g/dL      BUN/Creatinine Ratio --     Comment: Testing not performed        Anion Gap 15.0 mmol/L     Narrative:      GFR Normal >60  Chronic Kidney Disease <60  Kidney Failure <15      Magnesium [[id number]]  (Abnormal) Collected: 10/08/20 1828    Specimen: Blood Updated: 10/08/20 1918     Magnesium 2.9 mg/dL     Lactic Acid, Plasma [[id number]]  (Normal) Collected: 10/08/20 1828    Specimen: Blood Updated: 10/08/20 1914     Lactate 1.8 mmol/L     Troponin [[id number]]  (Abnormal) Collected: 10/08/20 1828    Specimen: Blood Updated: 10/08/20 1912     Troponin T 1.070 ng/mL     Narrative:      Troponin T Reference Range:  <= 0.03 ng/mL-   Negative for AMI  >0.03 ng/mL-     Abnormal for myocardial necrosis.  Clinicians  would have to utilize clinical acumen, EKG, Troponin and serial changes to determine if it is an Acute Myocardial Infarction or myocardial injury due to an underlying chronic condition.       Results may be falsely decreased if patient taking Biotin.      aPTT [[id number]]  (Normal) Collected: 10/08/20 1828    Specimen: Blood Updated: 10/08/20 1911     PTT 28.2 seconds     Vancomycin, Random [[id number]]  (Normal) Collected: 10/08/20 1828    Specimen: Blood Updated: 10/08/20 1910     Vancomycin Random 14.80 mcg/mL     CK [[id number]]  (Abnormal) Collected: 10/08/20 1828    Specimen: Blood Updated: 10/08/20 1909     Creatine Kinase 926 U/L     Calcium, Ionized [[id number]]  (Abnormal) Collected: 10/08/20 1828    Specimen: Blood Updated: 10/08/20 1856     Ionized Calcium 1.17 mmol/L     POC Glucose Once [[id number]]  (Abnormal) Collected: 10/08/20 1628    Specimen: Blood Updated: 10/08/20 1632     Glucose 131 mg/dL      Comment: Serial Number: [id number]:  [id number]       Blood Gas, Arterial [[id number]]  (Abnormal) Collected: 10/08/20 1437    Specimen: Arterial Blood Updated: 10/08/20 1441     Site Arterial Line     Gary's Test N/A     pH, Arterial 7.363 pH units      pCO2, Arterial 32.7 mm Hg      pO2, Arterial 173.6 mm Hg      HCO3, Arterial 18.6 mmol/L      Base Excess, Arterial -6.0 mmol/L      Comment: Serial Number: [id number]:  [id number]        O2 Saturation, Arterial 99.5 %      CO2 Content 19.6 mmol/L      Barometric Pressure for Blood Gas --     Comment: N/A        Modality Adult Vent     FIO2 40 %      Ventilator Mode ;AC     Set Tidal Volume 480     PEEP 5     Hemodilution No     Respiratory Rate 18    BUN [[id number]]  (Abnormal) Collected: 10/08/20 1234    Specimen: Blood Updated: 10/08/20 1425     BUN 27 mg/dL     Phosphorus [[id number]]  (Normal) Collected: 10/08/20 1234    Specimen: Blood Updated: 10/08/20 1422     Phosphorus 2.8 mg/dL     Comprehensive Metabolic Panel [[id number]]  (Abnormal)  Collected: 10/08/20 1234    Specimen: Blood Updated: 10/08/20 1421     Glucose 173 mg/dL      BUN --     Comment: Testing performed by alternate method        Creatinine 2.31 mg/dL      Sodium 141 mmol/L      Potassium 4.2 mmol/L      Chloride 108 mmol/L      CO2 17.0 mmol/L      Calcium 8.0 mg/dL      Total Protein 5.6 g/dL      Albumin 3.30 g/dL      ALT (SGPT) 64 U/L      AST (SGOT) 126 U/L      Alkaline Phosphatase 58 U/L      Total Bilirubin 0.5 mg/dL      eGFR Non African Amer 27 mL/min/1.73      Globulin 2.3 gm/dL      A/G Ratio 1.4 g/dL      BUN/Creatinine Ratio --     Comment: Testing not performed        Anion Gap 16.0 mmol/L     Narrative:      GFR Normal >60  Chronic Kidney Disease <60  Kidney Failure <15      Magnesium [[id number]]  (Abnormal) Collected: 10/08/20 1234    Specimen: Blood Updated: 10/08/20 1421     Magnesium 2.9 mg/dL     Calcium, Ionized [[id number]]  (Abnormal) Collected: 10/08/20 1234    Specimen: Blood Updated: 10/08/20 1322     Ionized Calcium 1.14 mmol/L     Lactic Acid, Plasma [[id number]]  (Abnormal) Collected: 10/08/20 1234    Specimen: Blood Updated: 10/08/20 1321     Lactate 3.4 mmol/L     Troponin [[id number]]  (Abnormal) Collected: 10/08/20 1234    Specimen: Blood Updated: 10/08/20 1320     Troponin T 1.310 ng/mL     Narrative:      Troponin T Reference Range:  <= 0.03 ng/mL-   Negative for AMI  >0.03 ng/mL-     Abnormal for myocardial necrosis.  Clinicians would have to utilize clinical acumen, EKG, Troponin and serial changes to determine if it is an Acute Myocardial Infarction or myocardial injury due to an underlying chronic condition.       Results may be falsely decreased if patient taking Biotin.      CK [[id number]]  (Abnormal) Collected: 10/08/20 1234    Specimen: Blood Updated: 10/08/20 1314     Creatine Kinase 1,220 U/L     aPTT [[id number]]  (Normal) Collected: 10/08/20 1234    Specimen: Blood Updated: 10/08/20 1301     PTT 27.2 seconds     CBC & Differential  [[id number]]  (Abnormal) Collected: 10/08/20 1234    Specimen: Blood Updated: 10/08/20 1256    Narrative:      The following orders were created for panel order CBC & Differential.  Procedure                               Abnormality         Status                     ---------                               -----------         ------                     CBC Auto Differential[[id number]]        Abnormal            Final result                 Please view results for these tests on the individual orders.    CBC Auto Differential [[id number]]  (Abnormal) Collected: 10/08/20 1234    Specimen: Blood Updated: 10/08/20 1256     WBC 15.60 10*3/mm3      RBC 3.15 10*6/mm3      Hemoglobin 10.9 g/dL      Hematocrit 32.6 %      .6 fL      MCH 34.8 pg      MCHC 33.6 g/dL      RDW 13.9 %      RDW-SD 50.8 fl      MPV 7.9 fL      Platelets 292 10*3/mm3      Neutrophil % 82.6 %      Lymphocyte % 7.3 %      Monocyte % 9.9 %      Eosinophil % 0.0 %      Basophil % 0.2 %      Neutrophils, Absolute 12.90 10*3/mm3      Lymphocytes, Absolute 1.10 10*3/mm3      Monocytes, Absolute 1.50 10*3/mm3      Eosinophils, Absolute 0.00 10*3/mm3      Basophils, Absolute 0.00 10*3/mm3      nRBC 0.1 /100 WBC           Imaging Results (Last 24 Hours)     Procedure Component Value Units Date/Time    XR Chest 1 View [[id number]] Resulted: 10/08/20 1356     Updated: 10/08/20 1356          EKG      I personally viewed and interpreted the patient's EKG/Telemetry data:    ECHOCARDIOGRAM:    STRESS MYOVIEW:    CARDIAC CATHETERIZATION:    OTHER:         Assessment/Plan     Active Problems:    Mixed hyperlipidemia    HTN (hypertension)    CAD (coronary artery disease)    Gastroesophageal reflux disease    Cardiac arrest (CMS/HCC)    Acute respiratory failure (CMS/HCC)    VALERIE (acute kidney injury) (CMS/HCC)    Elevated troponin    Leukocytosis    Septic shock (CMS/HCC)    Hypocalcemia    Anemia    Unresponsive  Status post cardiopulmonary arrest    Patient  presented with a cardiac arrest and was successfully resuscitated and is intubated  Patient has respiratory failure and is on a ventilator and is on 60% FiO2 today  Patient initially was on multiple vasopressors but is off all vasopressors and his blood pressures currently stable  Patient is currently in sinus rhythm  Patient has borderline blood troponin could be secondary to demand ischemia and resuscitation efforts  Patient had an echocardiogram which showed EF of 30 to 35%  Patient was on hypothermia protocol but is rewarmed again.  Patient will be seen by a neurologist and assess the neurological status to rule out any hypoxic or anoxic brain injury  Patient is also on antibiotics at this time for concerning sepsis.    I discussed the patients findings and my recommendations with nurse    Elliott Rapp MD  10/09/20  12:38 EDT

## 2020-10-09 NOTE — PROGRESS NOTES
PROGRESS NOTE      Patient Name: Primitivo Contreras  : 1935  MRN: [id number]  Primary Care Physician: Hawa Estevez NP  Date of admission: 10/7/2020    Patient Care Team:  Hawa Estevez NP as PCP - General (Nurse Practitioner)        Subjective   Subjective:     Renal failure patient is intubated sedated  Review of systems:  Unable to obtain      Allergies:  No Known Allergies    Objective   Exam:     Vital Signs  Temp:  [91.2 °F (32.9 °C)-98.6 °F (37 °C)] 98.6 °F (37 °C)  Heart Rate:  [37-81] 78  Resp:  [18-30] 18  BP: ()/(31-55) 127/52  Arterial Line BP: (118-184)/(38-68) 142/52  FiO2 (%):  [40 %-60 %] 60 %  SpO2:  [84 %-100 %] 99 %  on   ;   Device (Oxygen Therapy): ventilator  Body mass index is 24.82 kg/m².    General: Elderly  male intubated  Head:      Normocephalic and atraumatic.  Intubated sedated  Eyes:      PERRL/EOM intact, conjunctiva and sclera clear .    Neck:      No masses, thyromegaly,  trachea central   Lungs:    Mild crackles bilaterally to auscultation.    Heart:      Regular rate and rhythm, no murmur no gallop  Abd:        Soft, nontender, not distended, bowel sounds positive, no shifting dullness.  Msk:        No deformity or scoliosis noted of thoracic or lumbar spine.    Pulses:   Pulses normal in all 4 extremities.    Extr:        No cyanosis or clubbing--maybe mild edema.    Neuro:    Patient is intubated at this time  Skin:       Intact without lesions or rashes.    Psych:    Not applicable       Results Review:  I have personally reviewed most recent Data :  CBC    Results from last 7 days   Lab Units 10/09/20  0548 10/08/20  2352 10/08/20  1828 10/08/20  1234 10/08/20  0541 10/08/20  0034 10/07/20  1732   WBC 10*3/mm3 8.20 10. 15.00* 15.60* 12.90* 23.00* 20.30*   HEMOGLOBIN g/dL 10.9* 11.2* 11.2* 10.9* 10.2* 10.9* 11.2*   PLATELETS 10*3/mm3 205 212 216 292 239 311 336     CMP   Results from last 7 days   Lab Units 10/09/20  0548 10/08/20  2353 10/08/20  1822  10/08/20  1234 10/08/20  0541 10/08/20  0034 10/07/20  1931   SODIUM mmol/L 139 142 142 141 142 142 143   POTASSIUM mmol/L 4.3 3.5 3.5 4.2 3.7 2.8* 3.4*   CHLORIDE mmol/L 106 106 107 108* 113* 108* 108*   CO2 mmol/L 20.0* 21.0* 20.0* 17.0* 14.0* 13.0* 16.0*   BUN  32* 31* 29* 27* 25* 23 21   CREATININE mg/dL 2.67* 2.53* 2.40* 2.31* 2.15* 1.98* 1.77*   GLUCOSE mg/dL 91 106* 120* 173* 187* 280* 178*   ALBUMIN g/dL 3.00* 3.30* 3.60 3.30* 3.00* 3.40* 3.80   BILIRUBIN mg/dL 0.4 0.4 0.4 0.5 0.4 0.7 0.8   ALK PHOS U/L 54 58 67 58 53 63 80   AST (SGOT) U/L 125* 124* 138* 126* 119* 124* 110*   ALT (SGPT) U/L 53* 59* 63* 64* 59* 62* 66*     ABG    Results from last 7 days   Lab Units 10/09/20  0325 10/08/20  2146 10/08/20  1437 10/08/20  0317 10/07/20  1723   PH, ARTERIAL pH units 7.334* 7.287* 7.363 7.345* 7.453*   PCO2, ARTERIAL mm Hg 41.9 45.5 32.7* 28.2* 25.3*   PO2 ART mm Hg 97.6 58.4* 173.6* 372.3* 478.4*   O2 SATURATION ART % 97.1 86.4* 99.5* 100.0* 100.0*   BASE EXCESS ART mmol/L -3.5* -4.8* -6.0* -9.1* -5.0*     Ct Head Without Contrast    Result Date: 10/7/2020  1.No acute intracranial abnormality. 2.There is some cerebral atrophy. 3.Atherosclerotic changes are present. 4.There is an appearance to the paranasal sinuses which could relate to intubation.  At some point MR may be of benefit in further workup of this patient as thought clinically indicated.  Electronically Signed By-Eduardo Pratt On:10/7/2020 6:51 PM This report was finalized on [id number] by  Eduardo Pratt, .    Xr Chest 1 View    Result Date: 10/8/2020  1. Placement of an esophagogastric tube with the tip below the diaphragm. 2. Stable ET tube and left IJ central venous catheter. 3. Cardiomegaly. 4. Clear lungs.  Electronically Signed By-Tom Paiz On:10/8/2020 8:26 AM This report was finalized on [id number] by  Tom Paiz, .    Xr Chest 1 View    Result Date: 10/7/2020  1.Cardiomegaly 2.No definite infiltrates. 3.New left IJ line is noted.  4.There is an ET tube present. The tip is just above the ting. It may be pulled back 2.5 cm for more optimal positioning.  Electronically Signed By-Eduardo Pratt On:10/7/2020 9:55 PM This report was finalized on [id number] by  Eduardo Pratt .    Xr Chest 1 View    Result Date: 10/7/2020  1.No definite acute pulmonary process. 2.Cardiomegaly 3.ET tube could be pulled back slightly for more optimal positioning.  Electronically Signed By-Eduardo Pratt On:10/7/2020 6:49 PM This report was finalized on [id number] by  Eduardo Pratt .    Xr Abdomen Kub    Result Date: 10/7/2020  1.Nasogastric tube tip is within the stomach.  Electronically Signed By-Eduardo Pratt On:10/7/2020 9:55 PM This report was finalized on [id number] by  Eduardo Pratt, .      Results for orders placed during the hospital encounter of 10/07/20   Adult Transthoracic Echo Complete W/ Cont if Necessary Per Protocol    Narrative · Left ventricular ejection fraction appears to be 31 - 35%.  · The right ventricular cavity is mildly dilated.  · Mitral annular calcification is present.  · Mild mitral valve regurgitation is present.  · Mild tricuspid valve regurgitation is present.  · The following left ventricular wall segments are hypokinetic: mid   anterior, apical anterior, apical septal, apex hypokinetic and mid   anteroseptal.  · No pericardial effusion noted        Scheduled Meds:fosphenytoin, 200 mg PE, Intravenous, Q12H  heparin (porcine), 5,000 Units, Subcutaneous, Q12H  insulin lispro, 0-7 Units, Subcutaneous, Q6H  pantoprazole, 40 mg, Intravenous, Q AM  sodium chloride, 30 mL/kg, Intravenous, Once  sodium chloride, 10 mL, Intravenous, Q12H      Continuous Infusions:DOPamine, 2-20 mcg/kg/min, Last Rate: 1 mcg/kg/min (10/09/20 0130)  propofol, 5-50 mcg/kg/min, Last Rate: 25 mcg/kg/min (10/09/20 0813)  sodium bicarbonate drip (greater than 75 mEq/bag), 75 mEq      PRN Meds:•  dextrose  •  dextrose  •  glucagon (human recombinant)  •  insulin  lispro **AND** insulin lispro  •  magnesium sulfate  •  ondansetron  •  propofol  •  [COMPLETED] Insert peripheral IV **AND** sodium chloride  •  sodium chloride    Assessment/Plan   Assessment and Plan:         Mixed hyperlipidemia    HTN (hypertension)    CAD (coronary artery disease)    Gastroesophageal reflux disease    Cardiac arrest (CMS/[facility])    Acute respiratory failure (CMS/[facility])    VALERIE (acute kidney injury) (CMS/[facility])    Elevated troponin    Leukocytosis    Septic shock (CMS/[facility])    Hypocalcemia    Anemia    Unresponsive    ASSESSMENT:  · Acute kidney injury hemodynamically mediated s/p cardiac arrest patient likely has ATN by this time as no significant urine output more than 12 hours although hematuria dynamics somewhat better after starting pressors.  · Significant metabolic acidosis with increased anion gap  · Lactic acidosis  · S/p cardiac arrest  · On hypothermic protocol  · Hypermagnesemia  · Slightly elevated CPK           Plan:      ·  Shiley catheter was  placed as patient is not making urine and likely has dense ATN  · Still urine output is not adequate  · Electrolytes are acceptable, volume status seem acceptable  · Continue IV fluid at the same rate  · If oxygen requirement start getting worse we will consider stopping IV fluids and giving diuretics  · Intermittent hemodialysis tomorrow only if needed as hemodynamics have improved in last 24 hours  · Further escalation of treatment depend upon patient extent of anoxic brain damage  · Follow-up with electrolytes closely  · Continue bicarbonate based fluid as patient is still somewhat acidotic  · May need a neurology evaluation  · Will get the urine studies  · Follow-up with volume status closely  · May need a cardiology evaluation  · Thank you for letting me participate  •           Note started  by Tony Santana MD, 10/09/20, 12:22 PM EDT.  [facility] kidney consultant

## 2020-10-09 NOTE — CONSULTS
Chief Complaint:   Evaluate for unresponsiveness.      HPI:  The patient is a 85 year old gentleman who was brought to ER of outside hospital secondary being found down.  He was last seen 15 minutes prior to being found down.  It is unknown whether the family started CPR.  EMS were called he was in V-Fib.  CPR was initiated and received defibrillation for V-Fib and V-tach.  The patient was transferred to [facility] and hypothermia protocol was started. The patient is intubated and unresponsive.      ROS: unable to obtain secondary to patient's condition.       PMH:  Past Medical History:   Diagnosis Date   • Coronary artery disease    • Hyperlipidemia    • Hypertension      Social History     Socioeconomic History   • Marital status:      Spouse name: Not on file   • Number of children: Not on file   • Years of education: Not on file   • Highest education level: Not on file   Tobacco Use   • Smoking status: Former Smoker   • Smokeless tobacco: Never Used   • Tobacco comment: quit 30 yrs ago   Substance and Sexual Activity   • Alcohol use: Yes     Comment: social/ occasional   • Drug use: No   • Sexual activity: Defer     Past Surgical History:   Procedure Laterality Date   • CARDIAC CATHETERIZATION     • CORONARY STENT PLACEMENT       No family history on file.    Labs:  Lab Results (last 24 hours)     Procedure Component Value Units Date/Time    POC Glucose Once [[id number]]  (Normal) Collected: 10/09/20 1658    Specimen: Blood Updated: 10/09/20 1703     Glucose 88 mg/dL      Comment: Serial Number: [id number]:  [id number]       POC Glucose Once [[id number]]  (Normal) Collected: 10/09/20 1142    Specimen: Blood Updated: 10/09/20 1152     Glucose 87 mg/dL      Comment: Serial Number: [id number]:  [id number]       POC Glucose Once [[id number]]  (Normal) Collected: 10/09/20 1002    Specimen: Blood Updated: 10/09/20 1004     Glucose 86 mg/dL      Comment: Serial Number: [id number]:  [id number]       POC  Glucose Once [[id number]]  (Normal) Collected: 10/09/20 0909    Specimen: Blood Updated: 10/09/20 0910     Glucose 74 mg/dL      Comment: Serial Number: [id number]:  [id number]       POC Glucose Once [[id number]]  (Normal) Collected: 10/09/20 0831    Specimen: Blood Updated: 10/09/20 0909     Glucose 86 mg/dL      Comment: Serial Number: [id number]:  [id number]       POC Glucose Once [[id number]]  (Normal) Collected: 10/09/20 0730    Specimen: Blood Updated: 10/09/20 0832     Glucose 80 mg/dL      Comment: Serial Number: [id number]:  [id number]       POC Glucose Once [[id number]]  (Normal) Collected: 10/09/20 0552    Specimen: Blood Updated: 10/09/20 0730     Glucose 80 mg/dL      Comment: Serial Number: [id number]:  [id number]       BUN [[id number]]  (Abnormal) Collected: 10/09/20 0548    Specimen: Blood Updated: 10/09/20 0645     BUN 32 mg/dL     CK [[id number]]  (Abnormal) Collected: 10/09/20 0548    Specimen: Blood Updated: 10/09/20 0638     Creatine Kinase 443 U/L     Comprehensive Metabolic Panel [[id number]]  (Abnormal) Collected: 10/09/20 0548    Specimen: Blood Updated: 10/09/20 0637     Glucose 91 mg/dL      BUN --     Comment: Testing performed by alternate method        Creatinine 2.67 mg/dL      Sodium 139 mmol/L      Potassium 4.3 mmol/L      Chloride 106 mmol/L      CO2 20.0 mmol/L      Calcium 7.6 mg/dL      Total Protein 5.3 g/dL      Albumin 3.00 g/dL      ALT (SGPT) 53 U/L      AST (SGOT) 125 U/L      Alkaline Phosphatase 54 U/L      Total Bilirubin 0.4 mg/dL      eGFR Non African Amer 23 mL/min/1.73      Globulin 2.3 gm/dL      A/G Ratio 1.3 g/dL      BUN/Creatinine Ratio --     Comment: Testing not performed        Anion Gap 13.0 mmol/L     Narrative:      GFR Normal >60  Chronic Kidney Disease <60  Kidney Failure <15      Magnesium [[id number]]  (Normal) Collected: 10/09/20 0548    Specimen: Blood Updated: 10/09/20 0637     Magnesium 2.3 mg/dL     Phosphorus [[id number]]   (Normal) Collected: 10/09/20 0548    Specimen: Blood Updated: 10/09/20 0637     Phosphorus 4.5 mg/dL     Lactic Acid, Plasma [[id number]]  (Abnormal) Collected: 10/09/20 0548    Specimen: Blood Updated: 10/09/20 0637     Lactate 2.1 mmol/L     aPTT [[id number]]  (Normal) Collected: 10/09/20 0548    Specimen: Blood Updated: 10/09/20 0621     PTT 28.4 seconds     Calcium, Ionized [[id number]]  (Abnormal) Collected: 10/09/20 0548    Specimen: Blood Updated: 10/09/20 0618     Ionized Calcium 1.04 mmol/L     CBC & Differential [[id number]]  (Abnormal) Collected: 10/09/20 0548    Specimen: Blood Updated: 10/09/20 0605    Narrative:      The following orders were created for panel order CBC & Differential.  Procedure                               Abnormality         Status                     ---------                               -----------         ------                     CBC Auto Differential[[id number]]        Abnormal            Final result                 Please view results for these tests on the individual orders.    CBC Auto Differential [[id number]]  (Abnormal) Collected: 10/09/20 0548    Specimen: Blood Updated: 10/09/20 0605     WBC 8.20 10*3/mm3      RBC 3.11 10*6/mm3      Hemoglobin 10.9 g/dL      Hematocrit 31.9 %      .5 fL      MCH 35.2 pg      MCHC 34.3 g/dL      RDW 14.1 %      RDW-SD 50.8 fl      MPV 8.2 fL      Platelets 205 10*3/mm3      Neutrophil % 88.7 %      Lymphocyte % 5.6 %      Monocyte % 4.9 %      Eosinophil % 0.0 %      Basophil % 0.8 %      Neutrophils, Absolute 7.30 10*3/mm3      Lymphocytes, Absolute 0.50 10*3/mm3      Monocytes, Absolute 0.40 10*3/mm3      Eosinophils, Absolute 0.00 10*3/mm3      Basophils, Absolute 0.10 10*3/mm3      nRBC 0.1 /100 WBC     POC Glucose Once [[id number]]  (Normal) Collected: 10/09/20 0435    Specimen: Blood Updated: 10/09/20 0436     Glucose 85 mg/dL      Comment: Serial Number: [id number]:  [id number]       POC Glucose Once [[id number]]   (Normal) Collected: 10/09/20 0331    Specimen: Blood Updated: 10/09/20 0332     Glucose 91 mg/dL      Comment: Serial Number: [id number]:  [id number]       Blood Gas, Arterial [[id number]]  (Abnormal) Collected: 10/09/20 0325    Specimen: Arterial Blood Updated: 10/09/20 0328     Site Arterial Line     Gary's Test N/A     pH, Arterial 7.334 pH units      pCO2, Arterial 41.9 mm Hg      pO2, Arterial 97.6 mm Hg      HCO3, Arterial 22.3 mmol/L      Base Excess, Arterial -3.5 mmol/L      Comment: Serial Number: [id number]:  [id number]        O2 Saturation, Arterial 97.1 %      CO2 Content 23.5 mmol/L      Barometric Pressure for Blood Gas --     Comment: N/A        Modality Adult Vent     FIO2 60 %      Ventilator Mode ;AC     Set Tidal Volume 480     PEEP 5     Hemodilution No     Respiratory Rate 18    POC Glucose Once [[id number]]  (Normal) Collected: 10/09/20 0242    Specimen: Blood Updated: 10/09/20 0243     Glucose 100 mg/dL      Comment: Serial Number: [id number]:  [id number]       POC Glucose Once [[id number]]  (Normal) Collected: 10/09/20 0125    Specimen: Blood Updated: 10/09/20 0126     Glucose 97 mg/dL      Comment: Serial Number: [id number]:  [id number]       BUN [[id number]]  (Abnormal) Collected: 10/08/20 2352    Specimen: Blood Updated: 10/09/20 0044     BUN 31 mg/dL     Phosphorus [[id number]]  (Abnormal) Collected: 10/08/20 2352    Specimen: Blood Updated: 10/09/20 0037     Phosphorus 5.1 mg/dL     Comprehensive Metabolic Panel [[id number]]  (Abnormal) Collected: 10/08/20 2352    Specimen: Blood Updated: 10/09/20 0035     Glucose 106 mg/dL      BUN --     Comment: Testing performed by alternate method        Creatinine 2.53 mg/dL      Sodium 142 mmol/L      Potassium 3.5 mmol/L      Chloride 106 mmol/L      CO2 21.0 mmol/L      Calcium 7.9 mg/dL      Total Protein 5.7 g/dL      Albumin 3.30 g/dL      ALT (SGPT) 59 U/L      AST (SGOT) 124 U/L      Alkaline Phosphatase 58 U/L      Total  Bilirubin 0.4 mg/dL      eGFR Non African Amer 24 mL/min/1.73      Globulin 2.4 gm/dL      A/G Ratio 1.4 g/dL      BUN/Creatinine Ratio --     Comment: Testing not performed        Anion Gap 15.0 mmol/L     Narrative:      GFR Normal >60  Chronic Kidney Disease <60  Kidney Failure <15      CK [[id number]]  (Abnormal) Collected: 10/08/20 2352    Specimen: Blood Updated: 10/09/20 0035     Creatine Kinase 649 U/L     Magnesium [[id number]]  (Abnormal) Collected: 10/08/20 2352    Specimen: Blood Updated: 10/09/20 0035     Magnesium 2.6 mg/dL     Lactic Acid, Plasma [[id number]]  (Normal) Collected: 10/08/20 2352    Specimen: Blood Updated: 10/09/20 0027     Lactate 1.6 mmol/L     Calcium, Ionized [[id number]]  (Abnormal) Collected: 10/08/20 2352    Specimen: Blood Updated: 10/09/20 0025     Ionized Calcium 1.11 mmol/L     aPTT [[id number]]  (Normal) Collected: 10/08/20 2352    Specimen: Blood Updated: 10/09/20 0014     PTT 28.1 seconds     CBC & Differential [[id number]]  (Abnormal) Collected: 10/08/20 2352    Specimen: Blood Updated: 10/09/20 0004    Narrative:      The following orders were created for panel order CBC & Differential.  Procedure                               Abnormality         Status                     ---------                               -----------         ------                     CBC Auto Differential[[id number]]        Abnormal            Final result                 Please view results for these tests on the individual orders.    CBC Auto Differential [[id number]]  (Abnormal) Collected: 10/08/20 2352    Specimen: Blood Updated: 10/09/20 0004     WBC 10.20 10*3/mm3      RBC 3.23 10*6/mm3      Hemoglobin 11.2 g/dL      Hematocrit 33.4 %      .6 fL      MCH 34.8 pg      MCHC 33.6 g/dL      RDW 13.8 %      RDW-SD 49.9 fl      MPV 8.0 fL      Platelets 212 10*3/mm3      Neutrophil % 87.6 %      Lymphocyte % 7.0 %      Monocyte % 4.4 %      Eosinophil % 0.0 %      Basophil % 1.0 %       Neutrophils, Absolute 8.90 10*3/mm3      Lymphocytes, Absolute 0.70 10*3/mm3      Monocytes, Absolute 0.40 10*3/mm3      Eosinophils, Absolute 0.00 10*3/mm3      Basophils, Absolute 0.10 10*3/mm3      nRBC 0.0 /100 WBC     POC Glucose Once [[id number]]  (Normal) Collected: 10/08/20 2328    Specimen: Blood Updated: 10/08/20 2329     Glucose 97 mg/dL      Comment: Serial Number: [id number]:  [id number]       POC Glucose Once [[id number]]  (Normal) Collected: 10/08/20 2204    Specimen: Blood Updated: 10/08/20 2205     Glucose 96 mg/dL      Comment: Serial Number: [id number]:  [id number]       Blood Gas, Arterial [[id number]]  (Abnormal) Collected: 10/08/20 2146    Specimen: Arterial Blood Updated: 10/08/20 2155     Site Arterial Line     Gary's Test N/A     pH, Arterial 7.287 pH units      pCO2, Arterial 45.5 mm Hg      pO2, Arterial 58.4 mm Hg      HCO3, Arterial 21.7 mmol/L      Base Excess, Arterial -4.8 mmol/L      Comment: Serial Number: [id number]:  [id number]        O2 Saturation, Arterial 86.4 %      CO2 Content 23.1 mmol/L      Barometric Pressure for Blood Gas --     Comment: N/A        Modality Adult Vent     FIO2 40 %      Ventilator Mode ;AC     Set Tidal Volume 480     PEEP 5     Hemodilution No     Respiratory Rate 18    POC Glucose Once [[id number]]  (Normal) Collected: 10/08/20 2130    Specimen: Blood Updated: 10/08/20 2131     Glucose 103 mg/dL      Comment: Serial Number: [id number]:  [id number]       POC Glucose Once [[id number]]  (Abnormal) Collected: 10/08/20 2013    Specimen: Blood Updated: 10/08/20 2015     Glucose 109 mg/dL      Comment: Serial Number: [id number]:  [id number]       Blood Culture - Blood, Hand, Right [[id number]] Collected: 10/07/20 1931    Specimen: Blood from Hand, Right Updated: 10/08/20 2000     Blood Culture No growth at 24 hours    Blood Culture - Blood, Hand, Right [[id number]] Collected: 10/07/20 1931    Specimen: Blood from Hand, Right Updated: 10/08/20  2000     Blood Culture No growth at 24 hours    BUN [[id number]]  (Abnormal) Collected: 10/08/20 1828    Specimen: Blood Updated: 10/08/20 1947     BUN 29 mg/dL     Scan Slide [[id number]] Collected: 10/08/20 1828    Specimen: Blood Updated: 10/08/20 1937     Scan Slide --     Comment: See Manual Differential Results       Manual Differential [[id number]]  (Abnormal) Collected: 10/08/20 1828    Specimen: Blood Updated: 10/08/20 1937     Neutrophil % 12.0 %      Lymphocyte % 6.0 %      Monocyte % 9.0 %      Bands %  72.0 %      Myelocyte % 1.0 %      Neutrophils Absolute 12.60 10*3/mm3      Lymphocytes Absolute 0.90 10*3/mm3      Monocytes Absolute 1.35 10*3/mm3      Anisocytosis Slight/1+     Poikilocytes Slight/1+     Toxic Granulation Slight/1+     Platelet Morphology Normal    CBC & Differential [[id number]]  (Abnormal) Collected: 10/08/20 1828    Specimen: Blood Updated: 10/08/20 1937    Narrative:      The following orders were created for panel order CBC & Differential.  Procedure                               Abnormality         Status                     ---------                               -----------         ------                     CBC Auto Differential[[id number]]        Abnormal            Final result                 Please view results for these tests on the individual orders.    CBC Auto Differential [[id number]]  (Abnormal) Collected: 10/08/20 1828    Specimen: Blood Updated: 10/08/20 1937     WBC 15.00 10*3/mm3      RBC 3.14 10*6/mm3      Hemoglobin 11.2 g/dL      Hematocrit 32.7 %      .0 fL      MCH 35.7 pg      MCHC 34.3 g/dL      RDW 13.8 %      RDW-SD 49.9 fl      MPV 8.3 fL      Platelets 216 10*3/mm3     Phosphorus [[id number]]  (Normal) Collected: 10/08/20 1828    Specimen: Blood Updated: 10/08/20 1923     Phosphorus 3.7 mg/dL     Comprehensive Metabolic Panel [[id number]]  (Abnormal) Collected: 10/08/20 1828    Specimen: Blood Updated: 10/08/20 1918     Glucose 120 mg/dL      BUN --      Comment: Testing performed by alternate method        Creatinine 2.40 mg/dL      Sodium 142 mmol/L      Potassium 3.5 mmol/L      Chloride 107 mmol/L      CO2 20.0 mmol/L      Calcium 8.1 mg/dL      Total Protein 6.0 g/dL      Albumin 3.60 g/dL      ALT (SGPT) 63 U/L      AST (SGOT) 138 U/L      Alkaline Phosphatase 67 U/L      Total Bilirubin 0.4 mg/dL      eGFR Non African Amer 26 mL/min/1.73      Globulin 2.4 gm/dL      A/G Ratio 1.5 g/dL      BUN/Creatinine Ratio --     Comment: Testing not performed        Anion Gap 15.0 mmol/L     Narrative:      GFR Normal >60  Chronic Kidney Disease <60  Kidney Failure <15      Magnesium [[id number]]  (Abnormal) Collected: 10/08/20 1828    Specimen: Blood Updated: 10/08/20 1918     Magnesium 2.9 mg/dL     Lactic Acid, Plasma [[id number]]  (Normal) Collected: 10/08/20 1828    Specimen: Blood Updated: 10/08/20 1914     Lactate 1.8 mmol/L     Troponin [[id number]]  (Abnormal) Collected: 10/08/20 1828    Specimen: Blood Updated: 10/08/20 1912     Troponin T 1.070 ng/mL     Narrative:      Troponin T Reference Range:  <= 0.03 ng/mL-   Negative for AMI  >0.03 ng/mL-     Abnormal for myocardial necrosis.  Clinicians would have to utilize clinical acumen, EKG, Troponin and serial changes to determine if it is an Acute Myocardial Infarction or myocardial injury due to an underlying chronic condition.       Results may be falsely decreased if patient taking Biotin.      aPTT [[id number]]  (Normal) Collected: 10/08/20 1828    Specimen: Blood Updated: 10/08/20 1911     PTT 28.2 seconds     Vancomycin, Random [[id number]]  (Normal) Collected: 10/08/20 1828    Specimen: Blood Updated: 10/08/20 1910     Vancomycin Random 14.80 mcg/mL     CK [[id number]]  (Abnormal) Collected: 10/08/20 1828    Specimen: Blood Updated: 10/08/20 1909     Creatine Kinase 926 U/L     Calcium, Ionized [[id number]]  (Abnormal) Collected: 10/08/20 1828    Specimen: Blood Updated: 10/08/20 1856     Ionized Calcium 1.17  mmol/L             Medications:  Schedule Meds  fosphenytoin, 200 mg PE, Intravenous, Q12H  heparin (porcine), 5,000 Units, Subcutaneous, Q12H  insulin lispro, 0-7 Units, Subcutaneous, Q6H  pantoprazole, 40 mg, Intravenous, Q AM  sodium chloride, 30 mL/kg, Intravenous, Once  sodium chloride, 10 mL, Intravenous, Q12H          MED'S PRN  •  dextrose  •  dextrose  •  glucagon (human recombinant)  •  insulin lispro **AND** insulin lispro  •  magnesium sulfate  •  ondansetron  •  propofol  •  [COMPLETED] Insert peripheral IV **AND** sodium chloride  •  sodium chloride    Vitals:  Vitals:    10/09/20 1718   BP: 134/52   Pulse: 81   Resp:    Temp: 98.6   SpO2: 95%         Physical Exam:  The patient is well developed gentleman intubated, lying in bed.  Head NC, Neck supple. Lungs CTA,  CV  S1-S2 no murmur.  Abdomen soft  Ext no edema.   Neurologic Exam:  The patient is in coma.  No response to verbal and painful stimuli.  No posturing noted.  CN Pupils about 4 mm size no reaction to light. Corneal reflexes very minimal response, oculocephalic response is absent. Motor decreased tone is noted in all extremities. No spontaneous activity is noted. Sensory no response to pain.  Cerebellum and gait is deferred secondary to patient's condition.     Impression: The patient is 85 year old gentleman with multiple medical problems who was found down with cardiopulmonary arrest for a prolonged period of time.  More than 15 minutues. 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 Today is day #3.  His condition has not changed.  His prognosis is guarded.      Recomendations:  Will continue supportive care.  Will follow.

## 2020-10-09 NOTE — PLAN OF CARE
Goal Outcome Evaluation:         Patient off fentanyl gtt. Insulin gtt off. Vital signs remain stable throughout shift. MRI of brain w/out contrast. Palliative and neurology consulted.

## 2020-10-09 NOTE — PROGRESS NOTES
Pulmonary/ Critical Care/ sleep medicine PROGRESS Note        Patient Name:  Primitivo Contreras    :  1935    Medical Record:  [id number]    Primary Care Physician     Hawa Estevez, OPHELIA    [place]  Primitivo Contreras is a 85 y.o. male who presented to the ED from [facility] as a rescuscitated cardiac arrest.  Information for this HPI was obtained from staff and chart review.  No family present at bedside.  The patient was found down by family outside.  He had been mowing the lawn.  It was reported they last saw him 15 minutes prior.  It is unknown if family started CPR.  EMS was called and he was found in V-FIB.  CPR was started and he received defibrillation.  Upon arrival to the ED he received additional defibrillation for V-fib/V-tach.  He received 3 mg of Epinephrine, 2 amps of sodium bicarbonate, and an amiodarone bolus.  He was hypotensive and started on Levophed.  He was flown to [facility] for additional care.  In the ED, the patient is intubated and non responsive.  Labs showed a lactate of 9.6, ionized calcium of 0.88, WBC 20.30, troponin of 0.718, CKMB 11.18, and hemoglobin of 11.2.  Sepsis protocol was initiated and antibiotics of Vancomycin and Cefepime ordered.  IV fluid bolus ordered of 30ml/kg.  Blood and urine cultures ordered.  Cardiology is consulted.  The patient will be started on the hypothermia protocol and admitted to the ICU for continued care.  I was told the patient has a prior medical history of HTN and CAD with a past stent.       10/8/20:  Intubated, sedated, and on vasopressor support.  Hypothermia protocol.  Decreased urine output.  Overnight issues with hypertension and hypotension.  10/9: report of possible focal seizure last night vs myoclonus. Intubated and sedated. Now re-warmed. Pressors off               Review of Systems    Unable to complete due to clinical status      Medical History    Past Medical History:   Diagnosis Date   • Coronary artery disease    •  Hyperlipidemia    • Hypertension         Surgical History    Past Surgical History:   Procedure Laterality Date   • CARDIAC CATHETERIZATION     • CORONARY STENT PLACEMENT          Family History    No family history on file.    Social History    Social History     Tobacco Use   • Smoking status: Former Smoker   • Smokeless tobacco: Never Used   • Tobacco comment: quit 30 yrs ago   Substance Use Topics   • Alcohol use: Yes     Comment: social/ occasional        Allergies    No Known Allergies    Medications    Scheduled Meds:artificial tears, , Both Eyes, Q2H  cefepime, 2 g, Intravenous, Q24H  fosphenytoin, 200 mg PE, Intravenous, Q12H  pantoprazole, 40 mg, Intravenous, Q AM  sodium chloride, 30 mL/kg, Intravenous, Once  sodium chloride, 10 mL, Intravenous, Q12H      Continuous Infusions:DOPamine, 2-20 mcg/kg/min, Last Rate: 1 mcg/kg/min (10/09/20 013)  fentanyl 10 mcg/mL,  mcg/hr, Last Rate: 100 mcg/hr (10/09/20 06)  insulin, 1-20 Units/hr, Last Rate: Stopped (10/09/20 0553)  norepinephrine, 0.02-0.5 mcg/kg/min, Last Rate: Stopped (10/08/20 1420)  Pharmacy to Dose Cefepime,   Pharmacy to dose vancomycin,   propofol, 5-50 mcg/kg/min, Last Rate: 25 mcg/kg/min (10/09/20 08)  sodium bicarbonate drip less than 75 mEq/bag, 75 mEq, Last Rate: 75 mEq (10/09/20 0359)  vasopressin, 0.03 Units/min, Last Rate: Stopped (10/08/20 1836)      PRN Meds:.dextrose  •  fentanyl 10 mcg/mL  •  magnesium sulfate  •  norepinephrine  •  ondansetron  •  Pharmacy to Dose Cefepime  •  Pharmacy to dose vancomycin  •  propofol  •  [COMPLETED] Insert peripheral IV **AND** sodium chloride  •  sodium chloride  •  vancomycin  •  vecuronium      Physical Exam    tMax 24 hrs:  Temp (24hrs), Av.8 °F (34.3 °C), Min:91.2 °F (32.9 °C), Max:98.6 °F (37 °C)    Vitals Ranges:  Temp:  [91.2 °F (32.9 °C)-98.6 °F (37 °C)] 98.6 °F (37 °C)  Heart Rate:  [37-81] 78  Resp:  [18-30] 18  BP: ()/(31-55) 127/52  Arterial Line BP: (118-184)/(38-68)  142/52  FiO2 (%):  [40 %-60 %] 60 %  Intake and Output Last 3 Shifts:  I/O last 3 completed shifts:  In: 6908 [I.V.:6908]  Out: 3990 [Urine:1590; Emesis/NG output:1300; Stool:1100]    Constitutional:  Well developed, well nourished, acutely ill appearance   Eyes:  Pupils pinpoint, conjunctiva normal   HENT:  Atraumatic, external ears normal, nose normal, OETT. Neck- trachea midline. No jvd. RIJ shiley. LIJ central line  Respiratory:  Clear to diminished breath sounds bilaterally, no rales, no wheezing   Cardiovascular:  Normal rate, normal rhythm, no murmurs, no gallops, no rubs   GI:  Soft, nondistended, hypoactive bowel sounds  :  deferred  Musculoskeletal:  No edema, no clubbing, no cyanosis.   Integument:  Well hydrated, no rash   Neurologic:  Intubated and sedated  Psychiatric:  Unable to assess     labs    Lab Results (last 24 hours)     Procedure Component Value Units Date/Time    POC Glucose Once [[id number]]  (Normal) Collected: 10/09/20 1002    Specimen: Blood Updated: 10/09/20 1004     Glucose 86 mg/dL      Comment: Serial Number: [id number]:  [id number]       POC Glucose Once [[id number]]  (Normal) Collected: 10/09/20 0909    Specimen: Blood Updated: 10/09/20 0910     Glucose 74 mg/dL      Comment: Serial Number: [id number]:  [id number]       POC Glucose Once [[id number]]  (Normal) Collected: 10/09/20 0831    Specimen: Blood Updated: 10/09/20 0909     Glucose 86 mg/dL      Comment: Serial Number: [id number]:  [id number]       POC Glucose Once [[id number]]  (Normal) Collected: 10/09/20 0730    Specimen: Blood Updated: 10/09/20 0832     Glucose 80 mg/dL      Comment: Serial Number: [id number]:  [id number]       POC Glucose Once [[id number]]  (Normal) Collected: 10/09/20 0552    Specimen: Blood Updated: 10/09/20 0730     Glucose 80 mg/dL      Comment: Serial Number: [id number]:  [id number]       BUN [[id number]]  (Abnormal) Collected: 10/09/20 0548    Specimen: Blood Updated:  10/09/20 0645     BUN 32 mg/dL     CK [[id number]]  (Abnormal) Collected: 10/09/20 0548    Specimen: Blood Updated: 10/09/20 0638     Creatine Kinase 443 U/L     Comprehensive Metabolic Panel [[id number]]  (Abnormal) Collected: 10/09/20 0548    Specimen: Blood Updated: 10/09/20 0637     Glucose 91 mg/dL      BUN --     Comment: Testing performed by alternate method        Creatinine 2.67 mg/dL      Sodium 139 mmol/L      Potassium 4.3 mmol/L      Chloride 106 mmol/L      CO2 20.0 mmol/L      Calcium 7.6 mg/dL      Total Protein 5.3 g/dL      Albumin 3.00 g/dL      ALT (SGPT) 53 U/L      AST (SGOT) 125 U/L      Alkaline Phosphatase 54 U/L      Total Bilirubin 0.4 mg/dL      eGFR Non African Amer 23 mL/min/1.73      Globulin 2.3 gm/dL      A/G Ratio 1.3 g/dL      BUN/Creatinine Ratio --     Comment: Testing not performed        Anion Gap 13.0 mmol/L     Narrative:      GFR Normal >60  Chronic Kidney Disease <60  Kidney Failure <15      Magnesium [[id number]]  (Normal) Collected: 10/09/20 0548    Specimen: Blood Updated: 10/09/20 0637     Magnesium 2.3 mg/dL     Phosphorus [[id number]]  (Normal) Collected: 10/09/20 0548    Specimen: Blood Updated: 10/09/20 0637     Phosphorus 4.5 mg/dL     Lactic Acid, Plasma [[id number]]  (Abnormal) Collected: 10/09/20 0548    Specimen: Blood Updated: 10/09/20 0637     Lactate 2.1 mmol/L     aPTT [[id number]]  (Normal) Collected: 10/09/20 0548    Specimen: Blood Updated: 10/09/20 0621     PTT 28.4 seconds     Calcium, Ionized [[id number]]  (Abnormal) Collected: 10/09/20 0548    Specimen: Blood Updated: 10/09/20 0618     Ionized Calcium 1.04 mmol/L     CBC & Differential [[id number]]  (Abnormal) Collected: 10/09/20 0548    Specimen: Blood Updated: 10/09/20 0605    Narrative:      The following orders were created for panel order CBC & Differential.  Procedure                               Abnormality         Status                     ---------                               -----------          ------                     CBC Auto Differential[[id number]]        Abnormal            Final result                 Please view results for these tests on the individual orders.    CBC Auto Differential [[id number]]  (Abnormal) Collected: 10/09/20 0548    Specimen: Blood Updated: 10/09/20 0605     WBC 8.20 10*3/mm3      RBC 3.11 10*6/mm3      Hemoglobin 10.9 g/dL      Hematocrit 31.9 %      .5 fL      MCH 35.2 pg      MCHC 34.3 g/dL      RDW 14.1 %      RDW-SD 50.8 fl      MPV 8.2 fL      Platelets 205 10*3/mm3      Neutrophil % 88.7 %      Lymphocyte % 5.6 %      Monocyte % 4.9 %      Eosinophil % 0.0 %      Basophil % 0.8 %      Neutrophils, Absolute 7.30 10*3/mm3      Lymphocytes, Absolute 0.50 10*3/mm3      Monocytes, Absolute 0.40 10*3/mm3      Eosinophils, Absolute 0.00 10*3/mm3      Basophils, Absolute 0.10 10*3/mm3      nRBC 0.1 /100 WBC     POC Glucose Once [[id number]]  (Normal) Collected: 10/09/20 0435    Specimen: Blood Updated: 10/09/20 0436     Glucose 85 mg/dL      Comment: Serial Number: [id number]:  [id number]       POC Glucose Once [[id number]]  (Normal) Collected: 10/09/20 0331    Specimen: Blood Updated: 10/09/20 0332     Glucose 91 mg/dL      Comment: Serial Number: [id number]:  [id number]       Blood Gas, Arterial [[id number]]  (Abnormal) Collected: 10/09/20 0325    Specimen: Arterial Blood Updated: 10/09/20 0328     Site Arterial Line     Gary's Test N/A     pH, Arterial 7.334 pH units      pCO2, Arterial 41.9 mm Hg      pO2, Arterial 97.6 mm Hg      HCO3, Arterial 22.3 mmol/L      Base Excess, Arterial -3.5 mmol/L      Comment: Serial Number: [id number]:  [id number]        O2 Saturation, Arterial 97.1 %      CO2 Content 23.5 mmol/L      Barometric Pressure for Blood Gas --     Comment: N/A        Modality Adult Vent     FIO2 60 %      Ventilator Mode ;AC     Set Tidal Volume 480     PEEP 5     Hemodilution No     Respiratory Rate 18    POC Glucose Once [[id number]]  (Normal)  Collected: 10/09/20 0242    Specimen: Blood Updated: 10/09/20 0243     Glucose 100 mg/dL      Comment: Serial Number: [id number]:  [id number]       POC Glucose Once [[id number]]  (Normal) Collected: 10/09/20 0125    Specimen: Blood Updated: 10/09/20 0126     Glucose 97 mg/dL      Comment: Serial Number: [id number]:  [id number]       BUN [[id number]]  (Abnormal) Collected: 10/08/20 2352    Specimen: Blood Updated: 10/09/20 0044     BUN 31 mg/dL     Phosphorus [[id number]]  (Abnormal) Collected: 10/08/20 2352    Specimen: Blood Updated: 10/09/20 0037     Phosphorus 5.1 mg/dL     Comprehensive Metabolic Panel [[id number]]  (Abnormal) Collected: 10/08/20 2352    Specimen: Blood Updated: 10/09/20 0035     Glucose 106 mg/dL      BUN --     Comment: Testing performed by alternate method        Creatinine 2.53 mg/dL      Sodium 142 mmol/L      Potassium 3.5 mmol/L      Chloride 106 mmol/L      CO2 21.0 mmol/L      Calcium 7.9 mg/dL      Total Protein 5.7 g/dL      Albumin 3.30 g/dL      ALT (SGPT) 59 U/L      AST (SGOT) 124 U/L      Alkaline Phosphatase 58 U/L      Total Bilirubin 0.4 mg/dL      eGFR Non African Amer 24 mL/min/1.73      Globulin 2.4 gm/dL      A/G Ratio 1.4 g/dL      BUN/Creatinine Ratio --     Comment: Testing not performed        Anion Gap 15.0 mmol/L     Narrative:      GFR Normal >60  Chronic Kidney Disease <60  Kidney Failure <15      CK [[id number]]  (Abnormal) Collected: 10/08/20 2352    Specimen: Blood Updated: 10/09/20 0035     Creatine Kinase 649 U/L     Magnesium [[id number]]  (Abnormal) Collected: 10/08/20 2352    Specimen: Blood Updated: 10/09/20 0035     Magnesium 2.6 mg/dL     Lactic Acid, Plasma [[id number]]  (Normal) Collected: 10/08/20 2352    Specimen: Blood Updated: 10/09/20 0027     Lactate 1.6 mmol/L     Calcium, Ionized [[id number]]  (Abnormal) Collected: 10/08/20 2352    Specimen: Blood Updated: 10/09/20 0025     Ionized Calcium 1.11 mmol/L     aPTT [[id number]]  (Normal)  Collected: 10/08/20 2352    Specimen: Blood Updated: 10/09/20 0014     PTT 28.1 seconds     CBC & Differential [[id number]]  (Abnormal) Collected: 10/08/20 2352    Specimen: Blood Updated: 10/09/20 0004    Narrative:      The following orders were created for panel order CBC & Differential.  Procedure                               Abnormality         Status                     ---------                               -----------         ------                     CBC Auto Differential[[id number]]        Abnormal            Final result                 Please view results for these tests on the individual orders.    CBC Auto Differential [[id number]]  (Abnormal) Collected: 10/08/20 2352    Specimen: Blood Updated: 10/09/20 0004     WBC 10.20 10*3/mm3      RBC 3.23 10*6/mm3      Hemoglobin 11.2 g/dL      Hematocrit 33.4 %      .6 fL      MCH 34.8 pg      MCHC 33.6 g/dL      RDW 13.8 %      RDW-SD 49.9 fl      MPV 8.0 fL      Platelets 212 10*3/mm3      Neutrophil % 87.6 %      Lymphocyte % 7.0 %      Monocyte % 4.4 %      Eosinophil % 0.0 %      Basophil % 1.0 %      Neutrophils, Absolute 8.90 10*3/mm3      Lymphocytes, Absolute 0.70 10*3/mm3      Monocytes, Absolute 0.40 10*3/mm3      Eosinophils, Absolute 0.00 10*3/mm3      Basophils, Absolute 0.10 10*3/mm3      nRBC 0.0 /100 WBC     POC Glucose Once [[id number]]  (Normal) Collected: 10/08/20 2328    Specimen: Blood Updated: 10/08/20 2329     Glucose 97 mg/dL      Comment: Serial Number: [id number]:  [id number]       POC Glucose Once [[id number]]  (Normal) Collected: 10/08/20 2204    Specimen: Blood Updated: 10/08/20 2205     Glucose 96 mg/dL      Comment: Serial Number: [id number]:  [id number]       Blood Gas, Arterial [[id number]]  (Abnormal) Collected: 10/08/20 2146    Specimen: Arterial Blood Updated: 10/08/20 2155     Site Arterial Line     Gary's Test N/A     pH, Arterial 7.287 pH units      pCO2, Arterial 45.5 mm Hg      pO2, Arterial 58.4 mm Hg       HCO3, Arterial 21.7 mmol/L      Base Excess, Arterial -4.8 mmol/L      Comment: Serial Number: [id number]:  [id number]        O2 Saturation, Arterial 86.4 %      CO2 Content 23.1 mmol/L      Barometric Pressure for Blood Gas --     Comment: N/A        Modality Adult Vent     FIO2 40 %      Ventilator Mode ;AC     Set Tidal Volume 480     PEEP 5     Hemodilution No     Respiratory Rate 18    POC Glucose Once [[id number]]  (Normal) Collected: 10/08/20 2130    Specimen: Blood Updated: 10/08/20 2131     Glucose 103 mg/dL      Comment: Serial Number: [id number]:  [id number]       POC Glucose Once [[id number]]  (Abnormal) Collected: 10/08/20 2013    Specimen: Blood Updated: 10/08/20 2015     Glucose 109 mg/dL      Comment: Serial Number: [id number]:  [id number]       Blood Culture - Blood, Hand, Right [[id number]] Collected: 10/07/20 1931    Specimen: Blood from Hand, Right Updated: 10/08/20 2000     Blood Culture No growth at 24 hours    Blood Culture - Blood, Hand, Right [[id number]] Collected: 10/07/20 1931    Specimen: Blood from Hand, Right Updated: 10/08/20 2000     Blood Culture No growth at 24 hours    BUN [[id number]]  (Abnormal) Collected: 10/08/20 1828    Specimen: Blood Updated: 10/08/20 1947     BUN 29 mg/dL     Scan Slide [[id number]] Collected: 10/08/20 1828    Specimen: Blood Updated: 10/08/20 1937     Scan Slide --     Comment: See Manual Differential Results       Manual Differential [[id number]]  (Abnormal) Collected: 10/08/20 1828    Specimen: Blood Updated: 10/08/20 1937     Neutrophil % 12.0 %      Lymphocyte % 6.0 %      Monocyte % 9.0 %      Bands %  72.0 %      Myelocyte % 1.0 %      Neutrophils Absolute 12.60 10*3/mm3      Lymphocytes Absolute 0.90 10*3/mm3      Monocytes Absolute 1.35 10*3/mm3      Anisocytosis Slight/1+     Poikilocytes Slight/1+     Toxic Granulation Slight/1+     Platelet Morphology Normal    CBC & Differential [[id number]]  (Abnormal) Collected: 10/08/20 1828     Specimen: Blood Updated: 10/08/20 1937    Narrative:      The following orders were created for panel order CBC & Differential.  Procedure                               Abnormality         Status                     ---------                               -----------         ------                     CBC Auto Differential[[id number]]        Abnormal            Final result                 Please view results for these tests on the individual orders.    CBC Auto Differential [[id number]]  (Abnormal) Collected: 10/08/20 1828    Specimen: Blood Updated: 10/08/20 1937     WBC 15.00 10*3/mm3      RBC 3.14 10*6/mm3      Hemoglobin 11.2 g/dL      Hematocrit 32.7 %      .0 fL      MCH 35.7 pg      MCHC 34.3 g/dL      RDW 13.8 %      RDW-SD 49.9 fl      MPV 8.3 fL      Platelets 216 10*3/mm3     Phosphorus [[id number]]  (Normal) Collected: 10/08/20 1828    Specimen: Blood Updated: 10/08/20 1923     Phosphorus 3.7 mg/dL     Comprehensive Metabolic Panel [[id number]]  (Abnormal) Collected: 10/08/20 1828    Specimen: Blood Updated: 10/08/20 1918     Glucose 120 mg/dL      BUN --     Comment: Testing performed by alternate method        Creatinine 2.40 mg/dL      Sodium 142 mmol/L      Potassium 3.5 mmol/L      Chloride 107 mmol/L      CO2 20.0 mmol/L      Calcium 8.1 mg/dL      Total Protein 6.0 g/dL      Albumin 3.60 g/dL      ALT (SGPT) 63 U/L      AST (SGOT) 138 U/L      Alkaline Phosphatase 67 U/L      Total Bilirubin 0.4 mg/dL      eGFR Non African Amer 26 mL/min/1.73      Globulin 2.4 gm/dL      A/G Ratio 1.5 g/dL      BUN/Creatinine Ratio --     Comment: Testing not performed        Anion Gap 15.0 mmol/L     Narrative:      GFR Normal >60  Chronic Kidney Disease <60  Kidney Failure <15      Magnesium [[id number]]  (Abnormal) Collected: 10/08/20 1828    Specimen: Blood Updated: 10/08/20 1918     Magnesium 2.9 mg/dL     Lactic Acid, Plasma [[id number]]  (Normal) Collected: 10/08/20 1828    Specimen: Blood Updated:  10/08/20 1914     Lactate 1.8 mmol/L     Troponin [[id number]]  (Abnormal) Collected: 10/08/20 1828    Specimen: Blood Updated: 10/08/20 1912     Troponin T 1.070 ng/mL     Narrative:      Troponin T Reference Range:  <= 0.03 ng/mL-   Negative for AMI  >0.03 ng/mL-     Abnormal for myocardial necrosis.  Clinicians would have to utilize clinical acumen, EKG, Troponin and serial changes to determine if it is an Acute Myocardial Infarction or myocardial injury due to an underlying chronic condition.       Results may be falsely decreased if patient taking Biotin.      aPTT [[id number]]  (Normal) Collected: 10/08/20 1828    Specimen: Blood Updated: 10/08/20 1911     PTT 28.2 seconds     Vancomycin, Random [[id number]]  (Normal) Collected: 10/08/20 1828    Specimen: Blood Updated: 10/08/20 1910     Vancomycin Random 14.80 mcg/mL     CK [[id number]]  (Abnormal) Collected: 10/08/20 1828    Specimen: Blood Updated: 10/08/20 1909     Creatine Kinase 926 U/L     Calcium, Ionized [[id number]]  (Abnormal) Collected: 10/08/20 1828    Specimen: Blood Updated: 10/08/20 1856     Ionized Calcium 1.17 mmol/L     POC Glucose Once [[id number]]  (Abnormal) Collected: 10/08/20 1628    Specimen: Blood Updated: 10/08/20 1632     Glucose 131 mg/dL      Comment: Serial Number: [id number]:  [id number]       Blood Gas, Arterial [[id number]]  (Abnormal) Collected: 10/08/20 1437    Specimen: Arterial Blood Updated: 10/08/20 1441     Site Arterial Line     Gary's Test N/A     pH, Arterial 7.363 pH units      pCO2, Arterial 32.7 mm Hg      pO2, Arterial 173.6 mm Hg      HCO3, Arterial 18.6 mmol/L      Base Excess, Arterial -6.0 mmol/L      Comment: Serial Number: [id number]:  [id number]        O2 Saturation, Arterial 99.5 %      CO2 Content 19.6 mmol/L      Barometric Pressure for Blood Gas --     Comment: N/A        Modality Adult Vent     FIO2 40 %      Ventilator Mode ;AC     Set Tidal Volume 480     PEEP 5     Hemodilution No     Respiratory  Rate 18    BUN [[id number]]  (Abnormal) Collected: 10/08/20 1234    Specimen: Blood Updated: 10/08/20 1425     BUN 27 mg/dL     Phosphorus [[id number]]  (Normal) Collected: 10/08/20 1234    Specimen: Blood Updated: 10/08/20 1422     Phosphorus 2.8 mg/dL     Comprehensive Metabolic Panel [[id number]]  (Abnormal) Collected: 10/08/20 1234    Specimen: Blood Updated: 10/08/20 1421     Glucose 173 mg/dL      BUN --     Comment: Testing performed by alternate method        Creatinine 2.31 mg/dL      Sodium 141 mmol/L      Potassium 4.2 mmol/L      Chloride 108 mmol/L      CO2 17.0 mmol/L      Calcium 8.0 mg/dL      Total Protein 5.6 g/dL      Albumin 3.30 g/dL      ALT (SGPT) 64 U/L      AST (SGOT) 126 U/L      Alkaline Phosphatase 58 U/L      Total Bilirubin 0.5 mg/dL      eGFR Non African Amer 27 mL/min/1.73      Globulin 2.3 gm/dL      A/G Ratio 1.4 g/dL      BUN/Creatinine Ratio --     Comment: Testing not performed        Anion Gap 16.0 mmol/L     Narrative:      GFR Normal >60  Chronic Kidney Disease <60  Kidney Failure <15      Magnesium [[id number]]  (Abnormal) Collected: 10/08/20 1234    Specimen: Blood Updated: 10/08/20 1421     Magnesium 2.9 mg/dL     Calcium, Ionized [[id number]]  (Abnormal) Collected: 10/08/20 1234    Specimen: Blood Updated: 10/08/20 1322     Ionized Calcium 1.14 mmol/L     Lactic Acid, Plasma [[id number]]  (Abnormal) Collected: 10/08/20 1234    Specimen: Blood Updated: 10/08/20 1321     Lactate 3.4 mmol/L     Troponin [[id number]]  (Abnormal) Collected: 10/08/20 1234    Specimen: Blood Updated: 10/08/20 1320     Troponin T 1.310 ng/mL     Narrative:      Troponin T Reference Range:  <= 0.03 ng/mL-   Negative for AMI  >0.03 ng/mL-     Abnormal for myocardial necrosis.  Clinicians would have to utilize clinical acumen, EKG, Troponin and serial changes to determine if it is an Acute Myocardial Infarction or myocardial injury due to an underlying chronic condition.       Results may be falsely  decreased if patient taking Biotin.      CK [[id number]]  (Abnormal) Collected: 10/08/20 1234    Specimen: Blood Updated: 10/08/20 1314     Creatine Kinase 1,220 U/L     aPTT [[id number]]  (Normal) Collected: 10/08/20 1234    Specimen: Blood Updated: 10/08/20 1301     PTT 27.2 seconds     CBC & Differential [[id number]]  (Abnormal) Collected: 10/08/20 1234    Specimen: Blood Updated: 10/08/20 1256    Narrative:      The following orders were created for panel order CBC & Differential.  Procedure                               Abnormality         Status                     ---------                               -----------         ------                     CBC Auto Differential[[id number]]        Abnormal            Final result                 Please view results for these tests on the individual orders.    CBC Auto Differential [[id number]]  (Abnormal) Collected: 10/08/20 1234    Specimen: Blood Updated: 10/08/20 1256     WBC 15.60 10*3/mm3      RBC 3.15 10*6/mm3      Hemoglobin 10.9 g/dL      Hematocrit 32.6 %      .6 fL      MCH 34.8 pg      MCHC 33.6 g/dL      RDW 13.9 %      RDW-SD 50.8 fl      MPV 7.9 fL      Platelets 292 10*3/mm3      Neutrophil % 82.6 %      Lymphocyte % 7.3 %      Monocyte % 9.9 %      Eosinophil % 0.0 %      Basophil % 0.2 %      Neutrophils, Absolute 12.90 10*3/mm3      Lymphocytes, Absolute 1.10 10*3/mm3      Monocytes, Absolute 1.50 10*3/mm3      Eosinophils, Absolute 0.00 10*3/mm3      Basophils, Absolute 0.00 10*3/mm3      nRBC 0.1 /100 WBC     POC Glucose Once [[id number]]  (Abnormal) Collected: 10/08/20 1113    Specimen: Blood Updated: 10/08/20 1115     Glucose 151 mg/dL      Comment: Serial Number: [id number]:  [id number]             Imaging & Other Studies    Imaging Results (Last 72 Hours)     Procedure Component Value Units Date/Time    XR Chest 1 View [[id number]] Resulted: 10/08/20 1356     Updated: 10/08/20 1356    XR Chest 1 View [[id number]] Collected: 10/08/20 0825       Updated: 10/08/20 0840    Narrative:         DATE OF EXAM:   10/8/2020 4:19 AM     PROCEDURE:   XR CHEST 1 VW-     INDICATIONS:   SOA; I46.9-Cardiac arrest, cause unspecified; I49.01-Ventricular  fibrillation     COMPARISON:  10/7/2020     TECHNIQUE:   Portable chest radiograph.     FINDINGS:    Endotracheal tube tip 15 mm above the ting. The patient slightly  rotated to the right. Esophagogastric tube below the diaphragm. Left IJ  central venous catheter tip overlies the upper SVC. Stable cardiomegaly.  No pneumothorax. No consolidation or pleural effusion.       Impression:      1. Placement of an esophagogastric tube with the tip below the  diaphragm.   2. Stable ET tube and left IJ central venous catheter.  3. Cardiomegaly.  4. Clear lungs.     Electronically Signed By-Tom Paiz On:10/8/2020 8:26 AM  This report was finalized on [id number] by  Rebecca Masters    XR Abdomen KUB [[id number]] Collected: 10/07/20 2155     Updated: 10/07/20 2157    Narrative:      DATE OF EXAM:  10/7/2020 8:52 PM     PROCEDURE:  XR ABDOMEN KUB-     INDICATIONS:  NG placement; I46.9-Cardiac arrest, cause unspecified;  I49.01-Ventricular fibrillation     COMPARISON:  No Comparisons Available     TECHNIQUE:   Single radiographic view of the abdomen was obtained.        FINDINGS:  There is a nasogastric tube noted with its tip in the left quadrant of  the abdomen in the area of the stomach.        Impression:      1.Nasogastric tube tip is within the stomach.     Electronically Signed By-Eduardo Pratt On:10/7/2020 9:55 PM  This report was finalized on [id number] by  Rebecca Jeffries    XR Chest 1 View [[id number]] Collected: 10/07/20 2152     Updated: 10/07/20 2157    Narrative:      DATE OF EXAM:  10/7/2020 8:46 PM     PROCEDURE:  XR CHEST 1 VW-     INDICATIONS:  central line; I46.9-Cardiac arrest, cause unspecified;  I49.01-Ventricular fibrillation     COMPARISON:  10/07/2020     TECHNIQUE:   Single radiographic AP view of the  chest was obtained.     FINDINGS:  An ET tube is suggested with its tip above the ting. There is a left  IJ line noted with its tip at the junction of the brachiocephalic vein  with the superior vena cava. There is no definite pneumothorax. The  heart is enlarged. Lungs seem relatively clear.        Impression:      1.Cardiomegaly  2.No definite infiltrates.  3.New left IJ line is noted.  4.There is an ET tube present. The tip is just above the ting. It may  be pulled back 2.5 cm for more optimal positioning.     Electronically Signed By-Eduardo Pratt On:10/7/2020 9:55 PM  This report was finalized on [id number] by  Eduardo Pratt, .    CT Head Without Contrast [[id number]] Collected: 10/07/20 1849     Updated: 10/07/20 1853    Narrative:         DATE OF EXAM:  10/7/2020 6:37 PM     PROCEDURE:   CT HEAD WO CONTRAST-     INDICATIONS:   Mental status change, unknown cause; I46.9-Cardiac arrest, cause  unspecified; I49.01-Ventricular fibrillation     COMPARISON:  No Comparisons Available     TECHNIQUE:   Routine transaxial cuts were obtained through the head without the  administration of contrast. Automated exposure control and iterative  reconstruction methods were used.      FINDINGS:  There are paranasal sinus changes that could relate to intubation. There  is some suggested cerebral atrophy. There is no underlying hemorrhage.  There is no midline shift. There are no abnormal extra-axial fluid  collections. There is atherosclerotic changes involving the vertebral  arteries and carotid siphon.        Impression:      1.No acute intracranial abnormality.  2.There is some cerebral atrophy.  3.Atherosclerotic changes are present.  4.There is an appearance to the paranasal sinuses which could relate to  intubation.     At some point MR may be of benefit in further workup of this patient as  thought clinically indicated.     Electronically Signed ByJames Pratt On:10/7/2020 6:51 PM  This report was finalized on  [id number] by  Eduardo Pratt, .    XR Chest 1 View [[id number]] Collected: 10/07/20 1848     Updated: 10/07/20 1851    Narrative:      DATE OF EXAM:  10/7/2020 6:37 PM     PROCEDURE:  XR CHEST 1 VW-     INDICATIONS:  Cardiac arrest; I46.9-Cardiac arrest, cause unspecified;  I49.01-Ventricular fibrillation     COMPARISON:  No Comparisons Available     TECHNIQUE:   Single radiographic AP view of the chest was obtained.     FINDINGS:  Extraneous objects are seen overlying the chest obscuring the lungs to  some degree. The heart looks enlarged. There is an ET tube is suggested  with its tip just above the ting. This could be pulled back 1.5 cm for  more optimal positioning. The visualized lungs look relatively clear.  There are no pleural effusions.        Impression:      1.No definite acute pulmonary process.  2.Cardiomegaly  3.ET tube could be pulled back slightly for more optimal positioning.     Electronically Signed By-Eduardo Pratt On:10/7/2020 6:49 PM  This report was finalized on [id number] by  Eduardo Pratt, .          Assessment    Cardiac arrest (CMS/[facility])-etiology likely cardiac event  -consult cardiology   -monitor troponin, now normalized  -ECHO EF 31-35%   -CT head negative   -TTM therapy completed per protocol  -replace electrolytes as needed    Unresponsive-concern of anoxic brain injury  -CT head without acute findings  -TTM therapy completed  -EEG - hold until sedation off  -MRI brain pending - discuss with neurology  -neurology consulted    Acute respiratory failure (CMS/[facility])   -Intubated for mechanical ventilation support  -ABG and vent reviewed, adjust as appropriate.  -obtain sputum cx   -COVID-19 not detected  -wean off sedation    Leukocytosis  -blood cultures NTD  -lactate 9.6, admin fluid bolus.  Lactate 2.1  -Levo off.  Now Off Vaso  -discontinue empiric anbx    VALERIE (acute kidney injury) (CMS/[facility])  -creatinine 2.15  -on HCO3 gtt   -renal consulted.    -High risk for further progression and  may need hemodialysis.    Elevated troponin  -consult cardiology, Dr. Rapp  -ECHO EF 31-35%  -decreased on serial monitoring    Hypocalcemia  -replace and monitor    Anemia  -hgb stable, monitor    Mixed hyperlipidemia  -resume home med when appropriate     HTN (hypertension)  -resume home med when appropriate     CAD (coronary artery disease)  -resume home med when appropriate     Gastroesophageal reflux disease  -protonix       Consult Palliative Care    DVT ppy:  -SCDs    PPI ppy:  -protonix     Full Code

## 2020-10-09 NOTE — PROGRESS NOTES
Nutrition Services    Patient Name:  Primitivo Contreras  YOB: 1935  MRN: [id number]  Admit Date:  10/7/2020    Progress Note:    Check on. Propofol documented at 12.24 mL/hr (= ~323 kcals/day). Pt discussed in AM rounds, okay to start EN.    Labs (reviewed below):  -Noted pt was on an insulin drip per TTM protocol and is now off. BG on admit were 280 mg/dL, no hx of DM. Will monitor trend.    GI function: FMS in place, 100 mL o/p on 10/8. O/p slowing down compared to admission per d/w RN Mary Carmen.    Estimated Needs:  Using 81.6 kg (179 lb)  2040 kcals/day->25 kcals/kg  98 gms/day->1.2 gms/day  2040 mL->1 mL/kcal, adjust based on hydration status.    Nutrition Intervention:    Initiation of EN support. Conservative initial goal in case of risk of RFS (20 kcal/kg = 1660 kcals).    PO Diet: NPO  EN Prescription: Nutren 1.5 at initial goal 40 mL/hr + 15 mL/hr H2O flush, kcal from propofol (~323 kcals) = 1643 kcals (~20 kcals/kg), 60 gms of protein (61%), 669 mL free H2O+15 mL/hr H2O flush (x22 hrs = 330 mL/day) = 999 mL free H2O+Rx flushes, IVF.    End Goal-> Nutren 1.5 at 60 mL/hr x22 hrs (assumes interruptions for ADLs) provides 1980 kcal (97% estimated need), 90 gms of protein (92% estimated), 1003 mL free H2O+water flushes, Rx flushes, IVF.    RDN will also adjust EN regimen dependent on lab trend and provision of kcal from propofol.    RDN will check on over weekend.    Results from last 7 days   Lab Units 10/09/20  0548 10/08/20  2352 10/08/20  1828   SODIUM mmol/L 139 142 142   POTASSIUM mmol/L 4.3 3.5 3.5   CHLORIDE mmol/L 106 106 107   CO2 mmol/L 20.0* 21.0* 20.0*   BUN  32* 31* 29*   CREATININE mg/dL 2.67* 2.53* 2.40*   CALCIUM mg/dL 7.6* 7.9* 8.1*   BILIRUBIN mg/dL 0.4 0.4 0.4   ALK PHOS U/L 54 58 67   ALT (SGPT) U/L 53* 59* 63*   AST (SGOT) U/L 125* 124* 138*   GLUCOSE mg/dL 91 106* 120*     Results from last 7 days   Lab Units 10/09/20  0548 10/08/20  2352 10/08/20  1828   MAGNESIUM mg/dL 2.3  2.6* 2.9*   PHOSPHORUS mg/dL 4.5 5.1* 3.7   HEMOGLOBIN g/dL 10.9* 11.2* 11.2*   HEMATOCRIT % 31.9* 33.4* 32.7*        Electronically signed by:  Zenobia Pretty RD  10/09/20 08:57 EDT

## 2020-10-10 PROBLEM — G93.1 ANOXIC BRAIN INJURY (HCC): Status: ACTIVE | Noted: 2020-01-01

## 2020-10-10 PROBLEM — Z51.5 END OF LIFE CARE: Status: ACTIVE | Noted: 2020-01-01

## 2020-10-10 NOTE — PROGRESS NOTES
PROGRESS NOTE      Patient Name: Primitivo Contreras  : 1935  MRN: [id number]  Primary Care Physician: Hawa Estevez NP  Date of admission: 10/7/2020    Patient Care Team:  Hawa Estevez NP as PCP - General (Nurse Practitioner)        Subjective   Subjective:     Renal failure patient is intubated sedated  Review of systems:  Unable to obtain      Allergies:  No Known Allergies    Objective   Exam:     Vital Signs  Temp:  [98 °F (36.7 °C)-99.2 °F (37.3 °C)] 99.2 °F (37.3 °C)  Heart Rate:  [71-93] 80  Resp:  [29] 29  BP: (110)/(47) 110/47  Arterial Line BP: ()/(35-66) 160/47  FiO2 (%):  [40 %-60 %] 40 %  SpO2:  [95 %-100 %] 97 %  on   ;   Device (Oxygen Therapy): ventilator  Body mass index is 25.41 kg/m².    General: Elderly  male intubated  Head:      Normocephalic and atraumatic.  Intubated sedated  Eyes:      PERRL/EOM intact, conjunctiva and sclera clear .    Neck:      No masses, thyromegaly,  trachea central   Lungs:    Mild crackles bilaterally to auscultation.    Heart:      Regular rate and rhythm, no murmur no gallop  Abd:        Soft, nontender, not distended, bowel sounds positive, no shifting dullness.  Msk:        No deformity or scoliosis noted of thoracic or lumbar spine.    Pulses:   Pulses normal in all 4 extremities.    Extr:        No cyanosis or clubbing--maybe mild edema.    Neuro:    Patient is intubated at this time  Skin:       Intact without lesions or rashes.    Psych:    Not applicable       Results Review:  I have personally reviewed most recent Data :  CBC    Results from last 7 days   Lab Units 10/10/20  0403 10/09/20  0548 10/08/20  2352 10/08/20  1828 10/08/20  1234 10/08/20  0541 10/08/20  0034   WBC 10*3/mm3 7.30 8.20 10.20 15.00* 15.60* 12.90* 23.00*   HEMOGLOBIN g/dL 9.6* 10.9* 11.2* 11.2* 10.9* 10.2* 10.9*   PLATELETS 10*3/mm3 142 205 212 216 292 239 311     CMP   Results from last 7 days   Lab Units 10/10/20  0403 10/09/20  0548 10/08/20  1474  10/08/20  1828 10/08/20  1234 10/08/20  0541 10/08/20  0034   SODIUM mmol/L 139 139 142 142 141 142 142   POTASSIUM mmol/L 4.3 4.3 3.5 3.5 4.2 3.7 2.8*   CHLORIDE mmol/L 101 106 106 107 108* 113* 108*   CO2 mmol/L 23.0 20.0* 21.0* 20.0* 17.0* 14.0* 13.0*   BUN  47* 32* 31* 29* 27* 25* 23   CREATININE mg/dL 3.46* 2.67* 2.53* 2.40* 2.31* 2.15* 1.98*   GLUCOSE mg/dL 103* 91 106* 120* 173* 187* 280*   ALBUMIN g/dL 2.50* 3.00* 3.30* 3.60 3.30* 3.00* 3.40*   BILIRUBIN mg/dL 0.5 0.4 0.4 0.4 0.5 0.4 0.7   ALK PHOS U/L 91 54 58 67 58 53 63   AST (SGOT) U/L 109* 125* 124* 138* 126* 119* 124*   ALT (SGPT) U/L 41 53* 59* 63* 64* 59* 62*     ABG    Results from last 7 days   Lab Units 10/10/20  0407 10/09/20  0325 10/08/20  2146 10/08/20  1437 10/08/20  0317 10/07/20  1723   PH, ARTERIAL pH units 7.416 7.334* 7.287* 7.363 7.345* 7.453*   PCO2, ARTERIAL mm Hg 36.1 41.9 45.5 32.7* 28.2* 25.3*   PO2 ART mm Hg 147.8* 97.6 58.4* 173.6* 372.3* 478.4*   O2 SATURATION ART % 99.3* 97.1 86.4* 99.5* 100.0* 100.0*   BASE EXCESS ART mmol/L -1.2* -3.5* -4.8* -6.0* -9.1* -5.0*     Ct Head Without Contrast    Result Date: 10/7/2020  1.No acute intracranial abnormality. 2.There is some cerebral atrophy. 3.Atherosclerotic changes are present. 4.There is an appearance to the paranasal sinuses which could relate to intubation.  At some point MR may be of benefit in further workup of this patient as thought clinically indicated.  Electronically Signed By-Eduardo Pratt On:10/7/2020 6:51 PM This report was finalized on [id number] by  Eduardo Pratt, .    Mri Brain Without Contrast    Result Date: 10/9/2020  Diffuse abnormal signal intensity changes in the cerebrum and cerebellum, as can be seen in diffuse hypoxic-anoxic injury.  No acute intracranial hemorrhage or focal infarct.  Electronically Signed By-Dr. Samantha Diggs MD On:10/9/2020 4:42 PM This report was finalized on [id number] by Dr. Samantha Diggs MD.    Xr Chest 1 View    Result Date:  10/8/2020  1. Placement of an esophagogastric tube with the tip below the diaphragm. 2. Stable ET tube and left IJ central venous catheter. 3. Cardiomegaly. 4. Clear lungs.  Electronically Signed By-Tom Paiz On:10/8/2020 8:26 AM This report was finalized on [id number] by  Tom Paiz .    Xr Chest 1 View    Result Date: 10/7/2020  1.Cardiomegaly 2.No definite infiltrates. 3.New left IJ line is noted. 4.There is an ET tube present. The tip is just above the ting. It may be pulled back 2.5 cm for more optimal positioning.  Electronically Signed ByJames Pratt On:10/7/2020 9:55 PM This report was finalized on [id number] by  Eduardo Pratt .    Xr Chest 1 View    Result Date: 10/7/2020  1.No definite acute pulmonary process. 2.Cardiomegaly 3.ET tube could be pulled back slightly for more optimal positioning.  Electronically Signed ByJames Pratt On:10/7/2020 6:49 PM This report was finalized on [id number] by  Eduardo Pratt .    Xr Abdomen Kub    Result Date: 10/7/2020  1.Nasogastric tube tip is within the stomach.  Electronically Signed ByJames Pratt On:10/7/2020 9:55 PM This report was finalized on [id number] by  Eduardo Pratt, .      Results for orders placed during the hospital encounter of 10/07/20   Adult Transthoracic Echo Complete W/ Cont if Necessary Per Protocol    Narrative · Left ventricular ejection fraction appears to be 31 - 35%.  · The right ventricular cavity is mildly dilated.  · Mitral annular calcification is present.  · Mild mitral valve regurgitation is present.  · Mild tricuspid valve regurgitation is present.  · The following left ventricular wall segments are hypokinetic: mid   anterior, apical anterior, apical septal, apex hypokinetic and mid   anteroseptal.  · No pericardial effusion noted        Scheduled Meds:fosphenytoin, 200 mg PE, Intravenous, Q12H  heparin (porcine), 5,000 Units, Subcutaneous, Q12H  insulin lispro, 0-7 Units, Subcutaneous, Q6H  pantoprazole, 40 mg,  Intravenous, Q AM  sodium chloride, 30 mL/kg, Intravenous, Once  sodium chloride, 10 mL, Intravenous, Q12H      Continuous Infusions:DOPamine, 2-20 mcg/kg/min, Last Rate: 1 mcg/kg/min (10/09/20 0130)  propofol, 5-50 mcg/kg/min, Last Rate: Stopped (10/10/20 0630)  sodium bicarbonate drip (greater than 75 mEq/bag), 75 mEq, Last Rate: 75 mEq (10/10/20 0203)      PRN Meds:•  dextrose  •  dextrose  •  glucagon (human recombinant)  •  insulin lispro **AND** insulin lispro  •  magnesium sulfate  •  ondansetron  •  propofol  •  [COMPLETED] Insert peripheral IV **AND** sodium chloride  •  sodium chloride    Assessment/Plan   Assessment and Plan:         Mixed hyperlipidemia    HTN (hypertension)    CAD (coronary artery disease)    Gastroesophageal reflux disease    Cardiac arrest (CMS/[facility])    Acute respiratory failure (CMS/[facility])    VALERIE (acute kidney injury) (CMS/[facility])    Elevated troponin    Leukocytosis    Septic shock (CMS/[facility])    Hypocalcemia    Anemia    Unresponsive    ASSESSMENT:  · Acute kidney injury hemodynamically mediated s/p cardiac arrest patient likely has ATN by this time as no significant urine output more than 12 hours although hematuria dynamics somewhat better after starting pressors.  · Significant metabolic acidosis with increased anion gap  · Lactic acidosis  · S/p cardiac arrest  · On hypothermic protocol  · Hypermagnesemia  · Slightly elevated CPK           Plan:      ·  family considering hospice and comfort measures.  Will hold any dialysis at this time.  We will continue to follow.  · Discussed with critical care.  · No changes in neuro status with likely significant  extent of anoxic brain damage  · Thank you for letting me participate  •           Note started  by Tony Santana MD, 10/09/20, 12:22 PM EDT.  [facility] kidney consultant

## 2020-10-10 NOTE — NURSING NOTE
Pt medicated with robinul, morphine, and ativan. Pt extubated with goals of comfort per family request. Family given privacy.

## 2020-10-10 NOTE — SIGNIFICANT NOTE
Patient's family has requested to start comfort care measures only.  Appropriate orders are being written.  Patient will be extubated

## 2020-10-10 NOTE — CONSULTS
[facility] Medicine Services      Patient Name: Primitivo Contreras  : 1935  MRN: [id number]  Primary Care Physician: Hawa Estevez NP  Date of admission: 10/7/2020    Patient Care Team:  Hawa Estevez NP as PCP - General (Nurse Practitioner)      Reason for consultation: Medical management    Subjective   History Present Illness     Chief Complaint:   Chief Complaint   Patient presents with   • Cardiac Arrest     The patient is a 85 y.o. male  with history of hypertension and CAD s/p stent that initially presented to [place] ED after cardiac arrest.  Apparently the patient was found down by his family and was found with ventricular fibrillation by EMS and ACLS was initiated.  He received defibrillation and additional defibrillation in the ED for V. fib/V. tach.  He received 3 rounds of epinephrine, 2 amps of  sodium bicarb and amiodarone bolus and was started on levophed drip before being transferred to [facility] ICU.  Hypothermic protocol was started in the ICU because the patient did not recover neurological function.  The patient has been seen by nephrology for VALERIE, Neurology and cardiology.  MRI of the brain was done on 10/9/2020 which is concerning for diffuse abnormal signal intensity in the cerebrum and cerebellum concerning for hypoxic-anoxic and injury.    The was made comfort care in the afternoon of 10/10/2020.    The hospital service is consulted for medical management.         Review of Systems   Unable to perform ROS: mental status change           Personal History     Past Medical History:   Past Medical History:   Diagnosis Date   • Coronary artery disease    • Hyperlipidemia    • Hypertension        Surgical History:      Past Surgical History:   Procedure Laterality Date   • CARDIAC CATHETERIZATION     • CORONARY STENT PLACEMENT             Family History: Unable to provide family history.     Social History:  reports that he has quit smoking. He has  never used smokeless tobacco. He reports current alcohol use. He reports that he does not use drugs.      Medications:  Prior to Admission medications    Medication Sig Start Date End Date Taking? Authorizing Provider   aspirin (ADULT ASPIRIN EC LOW STRENGTH) 81 MG EC tablet Take 81 mg by mouth Daily. 9/11/15   Price Vidal MD   atorvastatin (LIPITOR) 40 MG tablet Take 40 mg by mouth Daily. 6/9/19   Price Vidal MD   clopidogrel (PLAVIX) 75 MG tablet Take 75 mg by mouth Daily. 6/20/19   Price Vidal MD   dilTIAZem CD (CARDIZEM CD) 120 MG 24 hr capsule Take 1 capsule by mouth 2 (Two) Times a Day. 11/1/19   Jose Lema MD   hydrALAZINE (APRESOLINE) 100 MG tablet TAKE 1 TABLET BY MOUTH EVERY 12 HOURS 6/9/20   Jose Lema MD   hydroCHLOROthiazide (MICROZIDE) 12.5 MG capsule TAKE 1 CAPSULE BY MOUTH EVERY DAY 7/22/20   Jose Lema MD   HYDROcodone-acetaminophen (NORCO)  MG per tablet Take 1 tablet by mouth Every 6 (Six) Hours As Needed. 7/3/19   Price Vidal MD   omeprazole (priLOSEC) 40 MG capsule Take 40 mg by mouth Daily. 7/22/19   Price Vidal MD   ramipril (ALTACE) 10 MG capsule Take 10 mg by mouth 2 (two) times a day. 8/1/19   Price Vidal MD       Allergies:  No Known Allergies    Objective   Objective     Vital Signs  Temp:  [98 °F (36.7 °C)-99.2 °F (37.3 °C)] 99.2 °F (37.3 °C)  Heart Rate:  [] 102  Resp:  [29] 29  Arterial Line BP: ()/(35-66) 132/44  FiO2 (%):  [40 %-60 %] 40 %  SpO2:  [81 %-100 %] 83 %  on   ;   Device (Oxygen Therapy): room air  Body mass index is 25.41 kg/m².    Physical Exam  Constitutional:       Comments: Eyes closed.   HENT:      Nose: Congestion present.   Neck:      Thyroid: No thyroid mass.      Trachea: Trachea normal.   Cardiovascular:      Rate and Rhythm: Normal rate and regular rhythm.   Pulmonary:      Effort: Tachypnea present.      Breath sounds: Examination of the right-lower field reveals rales.  Examination of the left-lower field reveals rales. Rales present. No rhonchi.   Abdominal:      General: Bowel sounds are normal.      Palpations: Abdomen is soft.   Musculoskeletal:      Comments: Not moving extremities.  Not following commands.   Lymphadenopathy:      Cervical: No cervical adenopathy.   Skin:     General: Skin is warm.   Neurological:      Mental Status: He is unresponsive.      Comments: Not following commands.  Eyes closed.   Psychiatric:      Comments: Not following commands.  Eyes closed.         Results Review:  I have personally reviewed most recent lab results.    Results from last 7 days   Lab Units 10/10/20  0403   WBC 10*3/mm3 7.30   HEMOGLOBIN g/dL 9.6*   HEMATOCRIT % 28.1*   PLATELETS 10*3/mm3 142   INR  1.14*     Results from last 7 days   Lab Units 10/10/20  0403 10/09/20  0548  10/08/20  1828 10/08/20  1234  10/08/20  0034  10/07/20  1732   SODIUM mmol/L 139 139   < > 142 141   < > 142   < > 144   POTASSIUM mmol/L 4.3 4.3   < > 3.5 4.2   < > 2.8*   < > 3.9   CHLORIDE mmol/L 101 106   < > 107 108*   < > 108*   < > 106   CO2 mmol/L 23.0 20.0*   < > 20.0* 17.0*   < > 13.0*   < > 15.0*   BUN  47* 32*   < > 29* 27*   < > 23   < > 18   CREATININE mg/dL 3.46* 2.67*   < > 2.40* 2.31*   < > 1.98*   < > 1.50*   GLUCOSE mg/dL 103* 91   < > 120* 173*   < > 280*   < > 169*   CALCIUM mg/dL 6.9* 7.6*   < > 8.1* 8.0*   < > 8.4*   < > 8.1*   ALT (SGPT) U/L 41 53*   < > 63* 64*   < > 62*   < >  --    AST (SGOT) U/L 109* 125*   < > 138* 126*   < > 124*   < >  --    TROPONIN T ng/mL  --   --   --  1.070* 1.310*  --  1.960*   < > 0.718*   LACTATE mmol/L  --  2.1*   < > 1.8 3.4*   < > 7.8*   < >  --    PROCALCITONIN ng/mL  --   --   --   --   --   --   --   --  0.05    < > = values in this interval not displayed.     Estimated Creatinine Clearance: 19.1 mL/min (A) (by C-G formula based on SCr of 3.46 mg/dL (H)).  Brief Urine Lab Results     None          Microbiology Results (last 10 days)     Procedure  Component Value - Date/Time    MRSA Screen, PCR (Inpatient) - Swab, Nares [[id number]]  (Normal) Collected: 10/07/20 2015    Lab Status: Final result Specimen: Swab from Nares Updated: 10/07/20 2154     MRSA PCR No MRSA Detected    Respiratory Panel PCR w/COVID-19(SARS-CoV-2) CECILIA/SINDY/NASEEM/PAD/COR/MAD In-House, NP Swab in UTM/VTM, 3-4 HR TAT - Swab, Nasopharynx [[id number]]  (Normal) Collected: 10/07/20 2015    Lab Status: Final result Specimen: Swab from Nasopharynx Updated: 10/07/20 2210     ADENOVIRUS, PCR Not Detected     Coronavirus 229E Not Detected     Coronavirus HKU1 Not Detected     Coronavirus NL63 Not Detected     Coronavirus OC43 Not Detected     COVID19 Not Detected     Human Metapneumovirus Not Detected     Human Rhinovirus/Enterovirus Not Detected     Influenza A PCR Not Detected     Influenza A H1 Not Detected     Influenza A H1 2009 PCR Not Detected     Influenza A H3 Not Detected     Influenza B PCR Not Detected     Parainfluenza Virus 1 Not Detected     Parainfluenza Virus 2 Not Detected     Parainfluenza Virus 3 Not Detected     Parainfluenza Virus 4 Not Detected     RSV, PCR Not Detected     Bordetella pertussis pcr Not Detected     Bordetella parapertussis PCR Not Detected     Chlamydophila pneumoniae PCR Not Detected     Mycoplasma pneumo by PCR Not Detected    Narrative:      Fact sheet for providers: https://docs.[employer]/wp-content/uploads/HAJ7214-5961-VH8.1-EUA-Provider-Fact-Sheet-3.pdf    Fact sheet for patients: https://docs.[employer]/wp-content/uploads/JNV3326-7947-DW5.1-EUA-Patient-Fact-Sheet-1.pdf    Blood Culture - Blood, Hand, Right [[id number]] Collected: 10/07/20 1931    Lab Status: Preliminary result Specimen: Blood from Hand, Right Updated: 10/09/20 2000     Blood Culture No growth at 2 days    Blood Culture - Blood, Hand, Right [[id number]] Collected: 10/07/20 1931    Lab Status: Preliminary result Specimen: Blood from Hand, Right Updated: 10/09/20 2000     Blood Culture No  growth at 2 days          ECG/EMG Results (most recent)     Procedure Component Value Units Date/Time    Adult Transthoracic Echo Complete W/ Cont if Necessary Per Protocol [[id number]] Collected: 10/07/20 1924     Updated: 10/08/20 1143     BSA 2.0 m^2      RVIDd 2.7 cm      IVSd 1.1 cm      LVIDd 5.1 cm      LVIDs 4.6 cm      LVPWd 1.2 cm      IVS/LVPW 0.96     FS 9.7 %      EDV(Teich) 125.2 ml      ESV(Teich) 98.7 ml      EF(Teich) 21.1 %      EDV(cubed) 134.6 ml      ESV(cubed) 99.1 ml      EF(cubed) 26.3 %      LV mass(C)d 228.6 grams      LV mass(C)dI 112.5 grams/m^2      SV(Teich) 26.4 ml      SI(Teich) 13.0 ml/m^2      SV(cubed) 35.4 ml      SI(cubed) 17.4 ml/m^2      Ao root diam 3.0 cm      Ao root area 7.0 cm^2      asc Aorta Diam 2.9 cm      LVOT diam 2.2 cm      LVOT area 3.7 cm^2      Ao root area (BSA corrected) 1.5     MV E max rashid 66.9 cm/sec      MV A max rashid 105.6 cm/sec      MV E/A 0.63     MV V2 max 102.3 cm/sec      MV max PG 4.2 mmHg      MV V2 mean 57.3 cm/sec      MV mean PG 1.6 mmHg      MV V2 VTI 30.8 cm      MV dec slope 235.5 cm/sec^2      MV dec time 0.28 sec      PA V2 max 123.3 cm/sec      PA max PG 6.1 mmHg      PA V2 mean 104.0 cm/sec      PA mean PG 4.5 mmHg      PA V2 VTI 26.1 cm       CV ECHO TAIWO - BZI_BMI 24.3 kilograms/m^2       CV ECHO TAIWO - BSA(HAYCOCK) 2.0 m^2       CV ECHO TAIWO - BZI_METRIC_WEIGHT 81.2 kg       CV ECHO TAIWO - BZI_METRIC_HEIGHT 182.9 cm     Narrative:      · Left ventricular ejection fraction appears to be 31 - 35%.  · The right ventricular cavity is mildly dilated.  · Mitral annular calcification is present.  · Mild mitral valve regurgitation is present.  · Mild tricuspid valve regurgitation is present.  · The following left ventricular wall segments are hypokinetic: mid   anterior, apical anterior, apical septal, apex hypokinetic and mid   anteroseptal.  · No pericardial effusion noted       ECG 12 Lead [[id number]] Collected: 10/07/20 3830      Updated: 10/08/20 1357    Narrative:      HEART RATE= 77  bpm  RR Interval= 784  ms  LA Interval= 191  ms  P Horizontal Axis= 70  deg  P Front Axis= 53  deg  QRSD Interval= 103  ms  QT Interval= 407  ms  QRS Axis= 49  deg  T Wave Axis= 92  deg  - OTHERWISE NORMAL ECG -  Sinus rhythm  Low voltage, extremity leads  No previous ECG available for comparison  Electronically Signed By:   Date and Time of Study: 2020-10-07 17:16:11    ECG 12 Lead [[id number]] Collected: 10/09/20 0421     Updated: 10/09/20 0422    Narrative:      HEART RATE= 68  bpm  RR Interval= 880  ms  LA Interval= 197  ms  P Horizontal Axis= -19  deg  P Front Axis= -7  deg  QRSD Interval= 93  ms  QT Interval= 452  ms  QRS Axis= 18  deg  T Wave Axis= 87  deg  - ABNORMAL ECG -  Sinus rhythm  Low voltage, extremity and precordial leads  Nonspecific T abnormalities, lateral leads  Electronically Signed By:   Date and Time of Study: 2020-10-09 04:21:16    ECG 12 Lead [[id number]] Collected: 10/10/20 0423     Updated: 10/10/20 0424    Narrative:      HEART RATE= 76  bpm  RR Interval= 792  ms  LA Interval= 188  ms  P Horizontal Axis= -18  deg  P Front Axis= 17  deg  QRSD Interval= 98  ms  QT Interval= 425  ms  QRS Axis= 28  deg  T Wave Axis= 105  deg  - ABNORMAL ECG -  Sinus rhythm  Low voltage, extremity leads  Nonspecific T abnormalities, lateral leads  When compared with ECG of 09-Oct-2020 4:21:16,  No significant change  Electronically Signed By:   Date and Time of Study: 2020-10-10 04:23:41    ECG 12 Lead [[id number]] Collected: 10/08/20 0832     Updated: 10/10/20 1224    Narrative:      HEART RATE= 43  bpm  RR Interval= 1384  ms  LA Interval= 194  ms  P Horizontal Axis=   deg  P Front Axis= -67  deg  QRSD Interval= 112  ms  QT Interval= 632  ms  QRS Axis= 45  deg  T Wave Axis= 90  deg  - ABNORMAL ECG -  Sinus or ectopic atrial bradycardia  Nonspecific T abnrm, anterolateral leads  Prolonged QT interval  When compared with ECG of 07-Oct-2020  17:16:11,  Significant rate decrease  Electronically Signed By: Katlyn Valiente (NASEEM) 10-Oct-2020 12:24:13  Date and Time of Study: 2020-10-08 08:32:15               Results for orders placed during the hospital encounter of 10/07/20   Adult Transthoracic Echo Complete W/ Cont if Necessary Per Protocol    Narrative · Left ventricular ejection fraction appears to be 31 - 35%.  · The right ventricular cavity is mildly dilated.  · Mitral annular calcification is present.  · Mild mitral valve regurgitation is present.  · Mild tricuspid valve regurgitation is present.  · The following left ventricular wall segments are hypokinetic: mid   anterior, apical anterior, apical septal, apex hypokinetic and mid   anteroseptal.  · No pericardial effusion noted          Ct Head Without Contrast    Result Date: 10/7/2020  1.No acute intracranial abnormality. 2.There is some cerebral atrophy. 3.Atherosclerotic changes are present. 4.There is an appearance to the paranasal sinuses which could relate to intubation.  At some point MR may be of benefit in further workup of this patient as thought clinically indicated.  Electronically Signed By-Eduardo Pratt On:10/7/2020 6:51 PM This report was finalized on [id number] by  Eduardo Pratt, .    Mri Brain Without Contrast    Result Date: 10/9/2020  Diffuse abnormal signal intensity changes in the cerebrum and cerebellum, as can be seen in diffuse hypoxic-anoxic injury.  No acute intracranial hemorrhage or focal infarct.  Electronically Signed By-Dr. Samantha Diggs MD On:10/9/2020 4:42 PM This report was finalized on [id number] by Dr. Samantha Diggs MD.    Xr Chest 1 View    Result Date: 10/8/2020  1. Placement of an esophagogastric tube with the tip below the diaphragm. 2. Stable ET tube and left IJ central venous catheter. 3. Cardiomegaly. 4. Clear lungs.  Electronically Signed By-Tom Paiz On:10/8/2020 8:26 AM This report was finalized on [id number] by  Tom Paiz .    Xr  Chest 1 View    Result Date: 10/7/2020  1.Cardiomegaly 2.No definite infiltrates. 3.New left IJ line is noted. 4.There is an ET tube present. The tip is just above the ting. It may be pulled back 2.5 cm for more optimal positioning.  Electronically Signed ByJames Pratt On:10/7/2020 9:55 PM This report was finalized on [id number] by  Rebecca Jeffries    Xr Chest 1 View    Result Date: 10/7/2020  1.No definite acute pulmonary process. 2.Cardiomegaly 3.ET tube could be pulled back slightly for more optimal positioning.  Electronically Signed ByJames Pratt On:10/7/2020 6:49 PM This report was finalized on [id number] by  Eduardo Pratt .    Xr Abdomen Kub    Result Date: 10/7/2020  1.Nasogastric tube tip is within the stomach.  Electronically Signed ByJames Pratt On:10/7/2020 9:55 PM This report was finalized on [id number] by  Eduardo Pratt, .        Estimated Creatinine Clearance: 19.1 mL/min (A) (by C-G formula based on SCr of 3.46 mg/dL (H)).    Assessment/Plan   Assessment/Plan       Active Hospital Problems    Diagnosis  POA   • **Cardiac arrest (CMS/[facility]) [I46.9]  Yes     Priority: High   • End of life care [Z51.5]  Not Applicable     Priority: High   • Anoxic brain injury (CMS/[facility]) [G93.1]  Yes     Priority: High   • Acute respiratory failure (CMS/[facility]) [J96.00]  Yes     Priority: High   • VALERIE (acute kidney injury) (CMS/[facility]) [N17.9]  Yes     Priority: High   • Unresponsive [R41.89]  Yes     Priority: High   • Hypocalcemia [E83.51]  Yes     Priority: Medium   • Anemia [D64.9]  Yes     Priority: Medium   • Gastroesophageal reflux disease [K21.9]  Yes     Priority: Medium   • CAD (coronary artery disease) [I25.10]  Unknown     Priority: Medium   • HTN (hypertension) [I10]  Unknown     Priority: Medium   • Elevated troponin [R77.8]  Yes   • Leukocytosis [D72.829]  Yes   • Septic shock (CMS/[facility]) [A41.9, R65.21]  Yes   • Mixed hyperlipidemia [E78.2]  Yes      Resolved Hospital Problems   No resolved problems to  display.     End-of-life care:  -Patient made comfort care 10/10/2020  -MRI brain 10/9/2020 concerning for anoxic brain injury    Problem list during the hospitalization:    Cardiac arrest s/p ACLS without recovery of neurological function s/p hypothermia protocol with persistent encephalopathy thought to be anoxic brain injury  -consulted neurology, nephrology and cardiology   -TTE 10/7/20--> LV EF 31-35%   -CT head 10/7/2020 --> negative for acute pathology  -s/p MRI brain 10/9/2020--> anoxic brain injury  -TTM therapy completed per protocol  -Patient made comfort care 10/10/2020     Acute respiratory failure   -Intubated for mechanical ventilation support  -COVID-19 ruled out on admission       Leukocytosis  -Blood cultures without no growth     VALERIE (acute kidney injury):  -Seen by nephrology  -Treated with sodium bicarbonate drip       Elevated troponin:  -s/p chest compressions/ACLS  -consulted  cardiology     Hypocalcemia  -replaced     Macrocytic anemia:  -Hemoglobin/hematocrit stable   -No evidence of bleeding     Mixed hyperlipidemia:  -On atorvastatin at home     HTN (hypertension):  -On HCTZ, hydralazine, ramipril and diltiazem at home     CAD s/p stent (coronary artery disease)  -On Plavix, atorvastatin, aspirin at home     Gastroesophageal reflux disease  -protonix                  VTE Prophylaxis -   Mechanical Order History:      Ordered        10/07/20 1830  Place Sequential Compression Device  Once,   Status:  Canceled         10/07/20 1830  Maintain Sequential Compression Device  Continuous,   Status:  Canceled         10/07/20 1826  Place Sequential Compression Device  Once         10/07/20 1826  Maintain Sequential Compression Device  Continuous                 Pharmalogical Order History:      Ordered     Dose Route Frequency Stop    10/09/20 1041  heparin (porcine) 5000 UNIT/ML injection 5,000 Units  Status:  Discontinued      5,000 Units SC Every 12 Hours Scheduled 10/10/20 1406                 CODE STATUS:    Code Status and Medical Interventions:   Ordered at: 10/10/20 1406     Level Of Support Discussed With:    Next of Kin (If No Surrogate)     Code Status:    No CPR     Medical Interventions (Level of Support Prior to Arrest):    Comfort Measures       This patient has been examined wearing appropriate Personal Protective Equipment 10/10/20      I discussed the patient's findings and my recommendations with patient and family.        Electronically signed by Navi Vyas DO, 10/10/20, 5:05 PM EDT.  [facility] Hospitalist Team

## 2020-10-10 NOTE — PROGRESS NOTES
"Referring Provider: Intensivist    Reason for follow-up: Post cardiac arrest     Patient Care Team:  Hawa Estevez NP as PCP - General (Nurse Practitioner)    Subjective .  Intubated  Objective  Intubated     Review of Systems   Unable to perform ROS: intubated       Patient has no known allergies.    Scheduled Meds:sodium chloride, 10 mL, Intravenous, Q12H      Continuous Infusions:   PRN Meds:.•  carboxymethylcellulose sod  •  diphenoxylate-atropine  •  glycopyrrolate **OR** glycopyrrolate **OR** glycopyrrolate **OR** glycopyrrolate  •  haloperidol **OR** haloperidol **OR** haloperidol lactate  •  haloperidol **OR** haloperidol **OR** haloperidol lactate  •  LORazepam  •  LORazepam  •  LORazepam  •  LORazepam  •  LORazepam  •  LORazepam  •  LORazepam  •  LORazepam  •  LORazepam  •  LORazepam  •  LORazepam  •  LORazepam  •  LORazepam  •  LORazepam  •  LORazepam  •  magnesium sulfate  •  Morphine  •  Morphine  •  ondansetron  •  [COMPLETED] Insert peripheral IV **AND** sodium chloride  •  sodium chloride        VITAL SIGNS  Vitals:    10/10/20 1130 10/10/20 1200 10/10/20 1230 10/10/20 1423   BP:       Pulse: 78 78 78 99   Resp:       Temp:       TempSrc:       SpO2: 98% 98% 99% 95%   Weight:       Height:           Flowsheet Rows      First Filed Value   Admission Height  182.9 cm (72\") Documented at 10/07/2020 1720   Admission Weight  81.6 kg (179 lb 14.3 oz) Documented at 10/07/2020 1720           TELEMETRY: Sinus rhythm    Physical Exam:  Constitutional:       Appearance: Well-developed.   Eyes:      General: No scleral icterus.  HENT:      Head: Normocephalic and atraumatic.   Neck:      Vascular: No carotid bruit or JVD.   Pulmonary:      Effort: Pulmonary effort is normal.      Breath sounds: Normal breath sounds. No wheezing. No rales.   Cardiovascular:      Normal rate. Regular rhythm.   Pulses:     Intact distal pulses.   Abdominal:      General: Bowel sounds are normal.      Palpations: Abdomen is " soft.   Musculoskeletal: Normal range of motion.   Skin:     General: Skin is warm and dry.      Findings: No rash.   Neurological:      Comments: Intubated   Psychiatric:      Comments: Intubated          Results Review:   I reviewed the patient's new clinical results.  Lab Results (last 24 hours)     Procedure Component Value Units Date/Time    POC Glucose Once [[id number]]  (Normal) Collected: 10/10/20 1123    Specimen: Blood Updated: 10/10/20 1125     Glucose 104 mg/dL      Comment: Serial Number: [id number]:  [id number]       CBC & Differential [[id number]]  (Abnormal) Collected: 10/10/20 0403    Specimen: Blood Updated: 10/10/20 0729    Narrative:      The following orders were created for panel order CBC & Differential.  Procedure                               Abnormality         Status                     ---------                               -----------         ------                     CBC Auto Differential[[id number]]        Abnormal            Final result                 Please view results for these tests on the individual orders.    CBC Auto Differential [[id number]]  (Abnormal) Collected: 10/10/20 0403    Specimen: Blood Updated: 10/10/20 0729     WBC 7.30 10*3/mm3      RBC 2.72 10*6/mm3      Hemoglobin 9.6 g/dL      Hematocrit 28.1 %      .5 fL      MCH 35.2 pg      MCHC 34.0 g/dL      RDW 13.7 %      RDW-SD 49.4 fl      MPV 8.9 fL      Platelets 142 10*3/mm3     Scan Slide [[id number]] Collected: 10/10/20 0403    Specimen: Blood Updated: 10/10/20 0729     Scan Slide --     Comment: See Manual Differential Results       Manual Differential [[id number]]  (Abnormal) Collected: 10/10/20 0403    Specimen: Blood Updated: 10/10/20 0729     Neutrophil % 44.0 %      Lymphocyte % 9.0 %      Monocyte % 4.0 %      Bands %  43.0 %      Neutrophils Absolute 6.35 10*3/mm3      Lymphocytes Absolute 0.66 10*3/mm3      Monocytes Absolute 0.29 10*3/mm3      RBC Morphology Normal     Toxic Granulation  Slight/1+     Large Platelets Slight/1+    POC Glucose Once [[id number]]  (Normal) Collected: 10/10/20 0509    Specimen: Blood Updated: 10/10/20 0510     Glucose 90 mg/dL      Comment: Serial Number: [id number]:  [id number]       BUN [[id number]]  (Abnormal) Collected: 10/10/20 0403    Specimen: Blood Updated: 10/10/20 0504     BUN 47 mg/dL     Comprehensive Metabolic Panel [[id number]]  (Abnormal) Collected: 10/10/20 0403    Specimen: Blood Updated: 10/10/20 0503     Glucose 103 mg/dL      BUN --     Comment: Testing performed by alternate method        Creatinine 3.46 mg/dL      Sodium 139 mmol/L      Potassium 4.3 mmol/L      Chloride 101 mmol/L      CO2 23.0 mmol/L      Calcium 6.9 mg/dL      Total Protein 5.0 g/dL      Albumin 2.50 g/dL      ALT (SGPT) 41 U/L      AST (SGOT) 109 U/L      Alkaline Phosphatase 91 U/L      Total Bilirubin 0.5 mg/dL      eGFR Non African Amer 17 mL/min/1.73      Globulin 2.5 gm/dL      A/G Ratio 1.0 g/dL      BUN/Creatinine Ratio --     Comment: Testing not performed        Anion Gap 15.0 mmol/L     Narrative:      GFR Normal >60  Chronic Kidney Disease <60  Kidney Failure <15      Triglycerides [[id number]]  (Normal) Collected: 10/10/20 0403    Specimen: Blood Updated: 10/10/20 0458     Triglycerides 117 mg/dL     Protime-INR [[id number]]  (Abnormal) Collected: 10/10/20 0403    Specimen: Blood Updated: 10/10/20 0429     Protime 12.5 Seconds      INR 1.14    aPTT [[id number]]  (Abnormal) Collected: 10/10/20 0403    Specimen: Blood Updated: 10/10/20 0429     PTT 37.1 seconds     Blood Gas, Arterial [[id number]]  (Abnormal) Collected: 10/10/20 0407    Specimen: Arterial Blood Updated: 10/10/20 0410     Site Arterial Line     Gary's Test N/A     pH, Arterial 7.416 pH units      pCO2, Arterial 36.1 mm Hg      pO2, Arterial 147.8 mm Hg      HCO3, Arterial 23.2 mmol/L      Base Excess, Arterial -1.2 mmol/L      Comment: Serial Number: [id number]:  [id number]        O2 Saturation, Arterial  99.3 %      CO2 Content 24.3 mmol/L      Barometric Pressure for Blood Gas --     Comment: N/A        Modality Adult Vent     FIO2 50 %      Ventilator Mode ;BiLevel     Set Tidal Volume 480     PEEP 5     Hemodilution No     Respiratory Rate 18    POC Glucose Once [[id number]]  (Normal) Collected: 10/09/20 2342    Specimen: Blood Updated: 10/09/20 2343     Glucose 75 mg/dL      Comment: Serial Number: [id number]:  [id number]       Blood Culture - Blood, Hand, Right [[id number]] Collected: 10/07/20 1931    Specimen: Blood from Hand, Right Updated: 10/09/20 2000     Blood Culture No growth at 2 days    Blood Culture - Blood, Hand, Right [[id number]] Collected: 10/07/20 1931    Specimen: Blood from Hand, Right Updated: 10/09/20 2000     Blood Culture No growth at 2 days    POC Glucose Once [[id number]]  (Normal) Collected: 10/09/20 1658    Specimen: Blood Updated: 10/09/20 1703     Glucose 88 mg/dL      Comment: Serial Number: [id number]:  [id number]             Imaging Results (Last 24 Hours)     Procedure Component Value Units Date/Time    MRI Brain Without Contrast [[id number]] Collected: 10/09/20 1637     Updated: 10/09/20 1644    Narrative:      MRI BRAIN WO CONTRAST-     Date of Exam: 10/9/2020 4:10 PM     Indication: Anoxic encephalopathy; I46.9-Cardiac arrest, cause  unspecified; I49.01-Ventricular fibrillation     Comparison: CT head without contrast 10/07/2020.     Technique: Multiplanar multisequence images of the brain were performed  without contrast according to routine brain MRI protocol.     FINDINGS:  There is diffuse abnormal T2 and FLAIR signal intensity change, within  the bilateral cerebral cortex, cerebellum, and bilateral basal ganglia,  as can be seen in cases of anoxic injury. No evidence of sulcal  effacement at this time. The ventricular configuration is normal. Nasal  cisterns remain patent. The major vascular flow voids appear preserved.  Diffusion weighted abnormalities are seen in  the same locations, without  ADC correlate, thought to represent T2 shine through, and no focal  infarct is identified.     No mass lesion, mass effect, midline shift or intracranial hemorrhage is  seen. Paranasal sinus mucosal thickening is seen, greatest in the  ethmoid sinuses.          Impression:      Diffuse abnormal signal intensity changes in the cerebrum and  cerebellum, as can be seen in diffuse hypoxic-anoxic injury.     No acute intracranial hemorrhage or focal infarct.     Electronically Signed By-Dr. Samantha Diggs MD On:10/9/2020 4:42 PM  This report was finalized on [id number] by Dr. Samantha Diggs MD.          EKG      I personally viewed and interpreted the patient's EKG/Telemetry data:    ECHOCARDIOGRAM:    STRESS MYOVIEW:    CARDIAC CATHETERIZATION:    OTHER:         Assessment/Plan     Active Problems:    Mixed hyperlipidemia    HTN (hypertension)    CAD (coronary artery disease)    Gastroesophageal reflux disease    Cardiac arrest (CMS/HCC)    Acute respiratory failure (CMS/HCC)    VALERIE (acute kidney injury) (CMS/HCC)    Elevated troponin    Leukocytosis    Septic shock (CMS/HCC)    Hypocalcemia    Anemia    Unresponsive  Status post cardiopulmonary arrest    Patient presented with a cardiac arrest and was successfully resuscitated and is intubated  Patient has respiratory failure and is on a ventilator and is on 60% FiO2 today  Patient initially was on multiple vasopressors but is off all vasopressors and his blood pressures currently stable  Patient is currently in sinus rhythm  Patient has borderline blood troponin could be secondary to demand ischemia and resuscitation efforts  Patient had an echocardiogram which showed EF of 30 to 35%  Patient was on hypothermia protocol but is rewarmed again.    Patient is seen by neurologist and he has anoxic brain injury  Family wants patient to be terminally extubated and comfort care only  Will not follow him    I discussed the patients findings  and my recommendations with nurse    Elliott Rapp MD  10/10/20  15:04 EDT

## 2020-10-10 NOTE — PROGRESS NOTES
Nutrition Services    Patient Name:  Primitivo Contreras  YOB: 1935  MRN: [id number]  Admit Date:  10/7/2020    Progress note:  Per nursing note, patient with distended abdomen last night with tube feeds coming from mouth.  NGT is now hooked to suction, TF on hold.  Per discussion with pt's RN, Latanya, will continue to hold at this time with possible restart of tube feeding later today per Dr. Garcia at a reduced rate of 10-15mL/hour.    Consider checking a KUB for any potential issues.    EN orders: Nutren 1.5 @40mL/hour (currently on hold due to above issues)    GI fxn: +BM 10/9    Labs reviewed below:  BUN/Creat elev- VALERIE, followed by Nephrology      RD to follow up per protocol. Monitor for ability to restart enteral nutrition.    Results from last 7 days   Lab Units 10/10/20  0403 10/09/20  0548 10/08/20  2352   SODIUM mmol/L 139 139 142   POTASSIUM mmol/L 4.3 4.3 3.5   CHLORIDE mmol/L 101 106 106   CO2 mmol/L 23.0 20.0* 21.0*   BUN  47* 32* 31*   CREATININE mg/dL 3.46* 2.67* 2.53*   CALCIUM mg/dL 6.9* 7.6* 7.9*   BILIRUBIN mg/dL 0.5 0.4 0.4   ALK PHOS U/L 91 54 58   ALT (SGPT) U/L 41 53* 59*   AST (SGOT) U/L 109* 125* 124*   GLUCOSE mg/dL 103* 91 106*     Results from last 7 days   Lab Units 10/10/20  0403 10/09/20  0548 10/08/20  2352 10/08/20  1828   MAGNESIUM mg/dL  --  2.3 2.6* 2.9*   PHOSPHORUS mg/dL  --  4.5 5.1* 3.7   HEMOGLOBIN g/dL 9.6* 10.9* 11.2* 11.2*   HEMATOCRIT % 28.1* 31.9* 33.4* 32.7*         Electronically signed by:  Kimber Herron RD  10/10/20 11:14 EDT

## 2020-10-10 NOTE — PLAN OF CARE
Pt made comfort care per family request. Extubated at 1423. Morphine, Ativan, and robinul given PRN. Family given privacy

## 2020-10-10 NOTE — PROGRESS NOTES
Cc:  Follow up for anoxic encephalopathy.       S:  Patient's condition not changed.     O: O/E the patient is in coma.  No posturing.  CN pupils about 5 mm fixed, corneal reflexes absent, oculocephalic response absent.  Motor no spontaneous activity.  Flaccid tone.     A:  Severe anoxic encephalopathy. Day #4. Prognosis is poor.    P:  Agree with management.  Patient is signed off.  Remains available for further assistance.

## 2020-10-10 NOTE — PROGRESS NOTES
Pulmonary/ Critical Care/ sleep medicine PROGRESS Note        Patient Name:  Primitivo Contreras    :  1935    Medical Record:  [id number]    Primary Care Physician     Hawa Estevez, OPHELIA    [place]  Primitivo Contreras is a 85 y.o. male who presented to the ED from [facility] as a rescuscitated cardiac arrest.  Information for this HPI was obtained from staff and chart review.  No family present at bedside.  The patient was found down by family outside.  He had been mowing the lawn.  It was reported they last saw him 15 minutes prior.  It is unknown if family started CPR.  EMS was called and he was found in V-FIB.  CPR was started and he received defibrillation.  Upon arrival to the ED he received additional defibrillation for V-fib/V-tach.  He received 3 mg of Epinephrine, 2 amps of sodium bicarbonate, and an amiodarone bolus.  He was hypotensive and started on Levophed.  He was flown to [facility] for additional care.  In the ED, the patient is intubated and non responsive.  Labs showed a lactate of 9.6, ionized calcium of 0.88, WBC 20.30, troponin of 0.718, CKMB 11.18, and hemoglobin of 11.2.  Sepsis protocol was initiated and antibiotics of Vancomycin and Cefepime ordered.  IV fluid bolus ordered of 30ml/kg.  Blood and urine cultures ordered.  Cardiology is consulted.  The patient will be started on the hypothermia protocol and admitted to the ICU for continued care.  I was told the patient has a prior medical history of HTN and CAD with a past stent.       10/8/20:  Intubated, sedated, and on vasopressor support.  Hypothermia protocol.  Decreased urine output.  Overnight issues with hypertension and hypotension.  10/9: report of possible focal seizure last night vs myoclonus. Intubated and sedated. Now re-warmed. Pressors off   10/10: large amt of secretions orally and via ETT. Emesis of tube feed noted. Intubated and sedated due to biting ETT. No purposeful neuro responses               Review of  Systems    Unable to complete due to clinical status      Medical History    Past Medical History:   Diagnosis Date   • Coronary artery disease    • Hyperlipidemia    • Hypertension         Surgical History    Past Surgical History:   Procedure Laterality Date   • CARDIAC CATHETERIZATION     • CORONARY STENT PLACEMENT          Family History    No family history on file.    Social History    Social History     Tobacco Use   • Smoking status: Former Smoker   • Smokeless tobacco: Never Used   • Tobacco comment: quit 30 yrs ago   Substance Use Topics   • Alcohol use: Yes     Comment: social/ occasional        Allergies    No Known Allergies    Medications    Scheduled Meds:fosphenytoin, 200 mg PE, Intravenous, Q12H  heparin (porcine), 5,000 Units, Subcutaneous, Q12H  insulin lispro, 0-7 Units, Subcutaneous, Q6H  pantoprazole, 40 mg, Intravenous, Q AM  sodium chloride, 30 mL/kg, Intravenous, Once  sodium chloride, 10 mL, Intravenous, Q12H      Continuous Infusions:DOPamine, 2-20 mcg/kg/min, Last Rate: 1 mcg/kg/min (10/09/20 0130)  propofol, 5-50 mcg/kg/min, Last Rate: Stopped (10/10/20 0630)  sodium bicarbonate drip (greater than 75 mEq/bag), 75 mEq, Last Rate: 75 mEq (10/10/20 0203)      PRN Meds:.•  dextrose  •  dextrose  •  glucagon (human recombinant)  •  insulin lispro **AND** insulin lispro  •  magnesium sulfate  •  ondansetron  •  propofol  •  [COMPLETED] Insert peripheral IV **AND** sodium chloride  •  sodium chloride      Physical Exam    tMax 24 hrs:  Temp (24hrs), Av.4 °F (36.9 °C), Min:98 °F (36.7 °C), Max:98.9 °F (37.2 °C)    Vitals Ranges:  Temp:  [98 °F (36.7 °C)-98.9 °F (37.2 °C)] 98.9 °F (37.2 °C)  Heart Rate:  [71-93] 80  Resp:  [29] 29  BP: (109-110)/(44-47) 110/47  Arterial Line BP: ()/(35-66) 160/47  FiO2 (%):  [40 %-60 %] 40 %  Intake and Output Last 3 Shifts:  I/O last 3 completed shifts:  In: 4852 [I.V.:4745; NG/GT:107]  Out: 2267 [Urine:1542; Emesis/NG output:525;  Stool:200]    Constitutional:  Well developed, well nourished, acutely ill appearance   Eyes:  Pupils pinpoint, conjunctiva normal   HENT:  Atraumatic, external ears normal, nose normal, OETT. Neck- trachea midline. No jvd. RIJ shiley. LIJ central line  Respiratory:  Clear to diminished breath sounds bilaterally, no rales, no wheezing   Cardiovascular:  Normal rate, normal rhythm, no murmurs, no gallops, no rubs   GI:  Soft, nondistended, hypoactive bowel sounds  :  deferred  Musculoskeletal:  No edema, no clubbing, no cyanosis.   Integument:  Well hydrated, no rash   Neurologic:  Intubated and sedated  Psychiatric:  Unable to assess     labs    Lab Results (last 24 hours)     Procedure Component Value Units Date/Time    CBC & Differential [[id number]]  (Abnormal) Collected: 10/10/20 0403    Specimen: Blood Updated: 10/10/20 0729    Narrative:      The following orders were created for panel order CBC & Differential.  Procedure                               Abnormality         Status                     ---------                               -----------         ------                     CBC Auto Differential[[id number]]        Abnormal            Final result                 Please view results for these tests on the individual orders.    CBC Auto Differential [[id number]]  (Abnormal) Collected: 10/10/20 0403    Specimen: Blood Updated: 10/10/20 0729     WBC 7.30 10*3/mm3      RBC 2.72 10*6/mm3      Hemoglobin 9.6 g/dL      Hematocrit 28.1 %      .5 fL      MCH 35.2 pg      MCHC 34.0 g/dL      RDW 13.7 %      RDW-SD 49.4 fl      MPV 8.9 fL      Platelets 142 10*3/mm3     Scan Slide [[id number]] Collected: 10/10/20 0403    Specimen: Blood Updated: 10/10/20 0729     Scan Slide --     Comment: See Manual Differential Results       Manual Differential [[id number]]  (Abnormal) Collected: 10/10/20 0403    Specimen: Blood Updated: 10/10/20 0729     Neutrophil % 44.0 %      Lymphocyte % 9.0 %      Monocyte % 4.0 %       Bands %  43.0 %      Neutrophils Absolute 6.35 10*3/mm3      Lymphocytes Absolute 0.66 10*3/mm3      Monocytes Absolute 0.29 10*3/mm3      RBC Morphology Normal     Toxic Granulation Slight/1+     Large Platelets Slight/1+    POC Glucose Once [[id number]]  (Normal) Collected: 10/10/20 0509    Specimen: Blood Updated: 10/10/20 0510     Glucose 90 mg/dL      Comment: Serial Number: [id number]:  [id number]       BUN [[id number]]  (Abnormal) Collected: 10/10/20 0403    Specimen: Blood Updated: 10/10/20 0504     BUN 47 mg/dL     Comprehensive Metabolic Panel [[id number]]  (Abnormal) Collected: 10/10/20 0403    Specimen: Blood Updated: 10/10/20 0503     Glucose 103 mg/dL      BUN --     Comment: Testing performed by alternate method        Creatinine 3.46 mg/dL      Sodium 139 mmol/L      Potassium 4.3 mmol/L      Chloride 101 mmol/L      CO2 23.0 mmol/L      Calcium 6.9 mg/dL      Total Protein 5.0 g/dL      Albumin 2.50 g/dL      ALT (SGPT) 41 U/L      AST (SGOT) 109 U/L      Alkaline Phosphatase 91 U/L      Total Bilirubin 0.5 mg/dL      eGFR Non African Amer 17 mL/min/1.73      Globulin 2.5 gm/dL      A/G Ratio 1.0 g/dL      BUN/Creatinine Ratio --     Comment: Testing not performed        Anion Gap 15.0 mmol/L     Narrative:      GFR Normal >60  Chronic Kidney Disease <60  Kidney Failure <15      Triglycerides [[id number]]  (Normal) Collected: 10/10/20 0403    Specimen: Blood Updated: 10/10/20 0458     Triglycerides 117 mg/dL     Protime-INR [[id number]]  (Abnormal) Collected: 10/10/20 0403    Specimen: Blood Updated: 10/10/20 0429     Protime 12.5 Seconds      INR 1.14    aPTT [[id number]]  (Abnormal) Collected: 10/10/20 0403    Specimen: Blood Updated: 10/10/20 0429     PTT 37.1 seconds     Blood Gas, Arterial [[id number]]  (Abnormal) Collected: 10/10/20 0407    Specimen: Arterial Blood Updated: 10/10/20 0410     Site Arterial Line     Gary's Test N/A     pH, Arterial 7.416 pH units      pCO2, Arterial 36.1 mm Hg       pO2, Arterial 147.8 mm Hg      HCO3, Arterial 23.2 mmol/L      Base Excess, Arterial -1.2 mmol/L      Comment: Serial Number: [id number]:  [id number]        O2 Saturation, Arterial 99.3 %      CO2 Content 24.3 mmol/L      Barometric Pressure for Blood Gas --     Comment: N/A        Modality Adult Vent     FIO2 50 %      Ventilator Mode ;BiLevel     Set Tidal Volume 480     PEEP 5     Hemodilution No     Respiratory Rate 18    POC Glucose Once [[id number]]  (Normal) Collected: 10/09/20 2342    Specimen: Blood Updated: 10/09/20 2343     Glucose 75 mg/dL      Comment: Serial Number: [id number]:  [id number]       Blood Culture - Blood, Hand, Right [[id number]] Collected: 10/07/20 1931    Specimen: Blood from Hand, Right Updated: 10/09/20 2000     Blood Culture No growth at 2 days    Blood Culture - Blood, Hand, Right [[id number]] Collected: 10/07/20 1931    Specimen: Blood from Hand, Right Updated: 10/09/20 2000     Blood Culture No growth at 2 days    POC Glucose Once [[id number]]  (Normal) Collected: 10/09/20 1658    Specimen: Blood Updated: 10/09/20 1703     Glucose 88 mg/dL      Comment: Serial Number: [id number]:  [id number]       POC Glucose Once [[id number]]  (Normal) Collected: 10/09/20 1142    Specimen: Blood Updated: 10/09/20 1152     Glucose 87 mg/dL      Comment: Serial Number: [id number]:  [id number]             Imaging & Other Studies    Imaging Results (Last 72 Hours)     Procedure Component Value Units Date/Time    MRI Brain Without Contrast [[id number]] Collected: 10/09/20 1637     Updated: 10/09/20 1644    Narrative:      MRI BRAIN WO CONTRAST-     Date of Exam: 10/9/2020 4:10 PM     Indication: Anoxic encephalopathy; I46.9-Cardiac arrest, cause  unspecified; I49.01-Ventricular fibrillation     Comparison: CT head without contrast 10/07/2020.     Technique: Multiplanar multisequence images of the brain were performed  without contrast according to routine brain MRI protocol.      FINDINGS:  There is diffuse abnormal T2 and FLAIR signal intensity change, within  the bilateral cerebral cortex, cerebellum, and bilateral basal ganglia,  as can be seen in cases of anoxic injury. No evidence of sulcal  effacement at this time. The ventricular configuration is normal. Nasal  cisterns remain patent. The major vascular flow voids appear preserved.  Diffusion weighted abnormalities are seen in the same locations, without  ADC correlate, thought to represent T2 shine through, and no focal  infarct is identified.     No mass lesion, mass effect, midline shift or intracranial hemorrhage is  seen. Paranasal sinus mucosal thickening is seen, greatest in the  ethmoid sinuses.          Impression:      Diffuse abnormal signal intensity changes in the cerebrum and  cerebellum, as can be seen in diffuse hypoxic-anoxic injury.     No acute intracranial hemorrhage or focal infarct.     Electronically Signed By-Dr. Samantha Diggs MD On:10/9/2020 4:42 PM  This report was finalized on [id number] by Dr. Samantha Diggs MD.    XR Chest 1 View [[id number]] Resulted: 10/08/20 1356     Updated: 10/08/20 1356    XR Chest 1 View [[id number]] Collected: 10/08/20 0825     Updated: 10/08/20 0840    Narrative:         DATE OF EXAM:   10/8/2020 4:19 AM     PROCEDURE:   XR CHEST 1 VW-     INDICATIONS:   SOA; I46.9-Cardiac arrest, cause unspecified; I49.01-Ventricular  fibrillation     COMPARISON:  10/7/2020     TECHNIQUE:   Portable chest radiograph.     FINDINGS:    Endotracheal tube tip 15 mm above the ting. The patient slightly  rotated to the right. Esophagogastric tube below the diaphragm. Left IJ  central venous catheter tip overlies the upper SVC. Stable cardiomegaly.  No pneumothorax. No consolidation or pleural effusion.       Impression:      1. Placement of an esophagogastric tube with the tip below the  diaphragm.   2. Stable ET tube and left IJ central venous catheter.  3. Cardiomegaly.  4. Clear lungs.      Electronically Signed By-Tom Paiz On:10/8/2020 8:26 AM  This report was finalized on [id number] by  Tom Paiz .    XR Abdomen KUB [[id number]] Collected: 10/07/20 2155     Updated: 10/07/20 2157    Narrative:      DATE OF EXAM:  10/7/2020 8:52 PM     PROCEDURE:  XR ABDOMEN KUB-     INDICATIONS:  NG placement; I46.9-Cardiac arrest, cause unspecified;  I49.01-Ventricular fibrillation     COMPARISON:  No Comparisons Available     TECHNIQUE:   Single radiographic view of the abdomen was obtained.        FINDINGS:  There is a nasogastric tube noted with its tip in the left quadrant of  the abdomen in the area of the stomach.        Impression:      1.Nasogastric tube tip is within the stomach.     Electronically Signed By-Eduardo Pratt On:10/7/2020 9:55 PM  This report was finalized on [id number] by  Rebecca Jeffries    XR Chest 1 View [[id number]] Collected: 10/07/20 2152     Updated: 10/07/20 2157    Narrative:      DATE OF EXAM:  10/7/2020 8:46 PM     PROCEDURE:  XR CHEST 1 VW-     INDICATIONS:  central line; I46.9-Cardiac arrest, cause unspecified;  I49.01-Ventricular fibrillation     COMPARISON:  10/07/2020     TECHNIQUE:   Single radiographic AP view of the chest was obtained.     FINDINGS:  An ET tube is suggested with its tip above the ting. There is a left  IJ line noted with its tip at the junction of the brachiocephalic vein  with the superior vena cava. There is no definite pneumothorax. The  heart is enlarged. Lungs seem relatively clear.        Impression:      1.Cardiomegaly  2.No definite infiltrates.  3.New left IJ line is noted.  4.There is an ET tube present. The tip is just above the ting. It may  be pulled back 2.5 cm for more optimal positioning.     Electronically Signed By-Eduardo Pratt On:10/7/2020 9:55 PM  This report was finalized on [id number] by  Rebecca Jeffries    CT Head Without Contrast [[id number]] Collected: 10/07/20 1849     Updated: 10/07/20 1853    Narrative:          DATE OF EXAM:  10/7/2020 6:37 PM     PROCEDURE:   CT HEAD WO CONTRAST-     INDICATIONS:   Mental status change, unknown cause; I46.9-Cardiac arrest, cause  unspecified; I49.01-Ventricular fibrillation     COMPARISON:  No Comparisons Available     TECHNIQUE:   Routine transaxial cuts were obtained through the head without the  administration of contrast. Automated exposure control and iterative  reconstruction methods were used.      FINDINGS:  There are paranasal sinus changes that could relate to intubation. There  is some suggested cerebral atrophy. There is no underlying hemorrhage.  There is no midline shift. There are no abnormal extra-axial fluid  collections. There is atherosclerotic changes involving the vertebral  arteries and carotid siphon.        Impression:      1.No acute intracranial abnormality.  2.There is some cerebral atrophy.  3.Atherosclerotic changes are present.  4.There is an appearance to the paranasal sinuses which could relate to  intubation.     At some point MR may be of benefit in further workup of this patient as  thought clinically indicated.     Electronically Signed By-Eduardo Pratt On:10/7/2020 6:51 PM  This report was finalized on [id number] by  Eduardo Pratt, .    XR Chest 1 View [[id number]] Collected: 10/07/20 1848     Updated: 10/07/20 1851    Narrative:      DATE OF EXAM:  10/7/2020 6:37 PM     PROCEDURE:  XR CHEST 1 VW-     INDICATIONS:  Cardiac arrest; I46.9-Cardiac arrest, cause unspecified;  I49.01-Ventricular fibrillation     COMPARISON:  No Comparisons Available     TECHNIQUE:   Single radiographic AP view of the chest was obtained.     FINDINGS:  Extraneous objects are seen overlying the chest obscuring the lungs to  some degree. The heart looks enlarged. There is an ET tube is suggested  with its tip just above the ting. This could be pulled back 1.5 cm for  more optimal positioning. The visualized lungs look relatively clear.  There are no pleural effusions.         Impression:      1.No definite acute pulmonary process.  2.Cardiomegaly  3.ET tube could be pulled back slightly for more optimal positioning.     Electronically Signed By-Eduardo Pratt On:10/7/2020 6:49 PM  This report was finalized on [id number] by  Eduardo Pratt, .          Assessment    Cardiac arrest (CMS/HCC)-etiology likely cardiac event  -consult cardiology   -monitor troponin, now normalized  -ECHO EF 31-35%   -CT head negative   -TTM therapy completed per protocol  -replace electrolytes as needed    Unresponsive-concern of anoxic brain injury  -CT head without acute findings  -TTM therapy completed  -MRI reviewed-Diffuse abnormal signal intensity changes in the cerebrum and cerebellum, as can be seen in diffuse hypoxic-anoxic injury.  -neurology consulted-clinical features are consistent with anoxic encephalopathy    Acute respiratory failure (CMS/HCC)   -Intubated for mechanical ventilation support  -ABG and vent reviewed, adjust as appropriate.  -obtain sputum cx   -COVID-19 not detected  -not weaning candidate due to encephalopathy    Leukocytosis  -blood cultures NTD  -lactate 9.6, admin fluid bolus.  Lactate 2.1  -off pressors  -discontinue empiric anbx    VALERIE (acute kidney injury) (CMS/HCC)  -creatinine 2.15  -on HCO3 gtt   -renal consulted.    -High risk for further progression and may need hemodialysis.    Elevated troponin  -consult cardiology, Dr. Rapp  -ECHO EF 31-35%  -decreased on serial monitoring    Hypocalcemia  -replace and monitor    Anemia  -hgb stable, monitor    Mixed hyperlipidemia  -resume home med when appropriate     HTN (hypertension)  -resume home med when appropriate     CAD (coronary artery disease)  -resume home med when appropriate     Gastroesophageal reflux disease  -protonix     Tube feeds as tolerated    Consult Palliative Care    DVT ppy:  -SCDs    PPI ppy:  -protonix     DNR

## 2020-10-10 NOTE — NURSING NOTE
I was assessing the patient and checking the patency of the NGT. Patients abd was hard and distended and as I pushed air into the NGT tube feeds began coming out of the patients mouth, I immediately suctioned the patients mouth and hooked the NGT to lwis. No signs of aspiration, will continue to monitor pt closely.

## 2020-10-10 NOTE — PLAN OF CARE
Goal Outcome Evaluation:         Pt remained stable overnight. Pt had one episode of vomiting and was placed back on LWIS. Will continue to monitor closely.

## 2020-10-11 NOTE — PLAN OF CARE
Goal Outcome Evaluation:     Progress: declining   Pt transfer from ICU, is comfort care at this time, daughter at bedside, care explained, will cont to monitor.

## 2020-10-11 NOTE — DISCHARGE SUMMARY
[facility] Medicine Services  DISCHARGE SUMMARY        Prepared For PCP:  Hawa Estevez NP    Patient Name: Primitivo Contreras  : 1935  MRN: [id number]      Date of Admission:   10/7/2020    Date of Discharge:  10/11/2020     Date of death: 10/11/2020    Length of stay:  LOS: 4 days     Hospital Course     Presenting Problem:   Ventricular fibrillation (CMS/[facility]) [I49.01]  Cardiac arrest (CMS/[facility]) [I46.9]      Active Hospital Problems    Diagnosis  POA   • **Cardiac arrest (CMS/[facility]) [I46.9]  Yes     Priority: High   • End of life care [Z51.5]  Not Applicable     Priority: High   • Anoxic brain injury (CMS/[facility]) [G93.1]  Yes     Priority: High   • Acute respiratory failure (CMS/[facility]) [J96.00]  Yes     Priority: High   • VALERIE (acute kidney injury) (CMS/[facility]) [N17.9]  Yes     Priority: High   • Unresponsive [R41.89]  Yes     Priority: High   • Hypocalcemia [E83.51]  Yes     Priority: Medium   • Anemia [D64.9]  Yes     Priority: Medium   • Gastroesophageal reflux disease [K21.9]  Yes     Priority: Medium   • CAD (coronary artery disease) [I25.10]  Unknown     Priority: Medium   • HTN (hypertension) [I10]  Unknown     Priority: Medium   • Elevated troponin [R77.8]  Yes   • Leukocytosis [D72.829]  Yes   • Septic shock (CMS/[facility]) [A41.9, R65.21]  Yes   • Mixed hyperlipidemia [E78.2]  Yes      Resolved Hospital Problems   No resolved problems to display.           Hospital Course:          End-of-life care as of 10/10/2020:  -Comfort care measures ordered in ICU  -Patient pronounced dead in the morning of 10/11/2020    Problem list during hospitalization:     Cardiac arrest s/p ACLS without recovery of neurological function s/p hypothermia protocol with persistent encephalopathy thought to be anoxic brain injury  -consulted neurology, nephrology and cardiology   -TTE 10/7/20--> LV EF 31-35%   -CT head 10/7/2020 --> negative for acute pathology  -s/p MRI brain 10/9/2020--> anoxic brain  injury  -TTM therapy completed per protocol  -Patient made comfort care 10/10/2020      Acute respiratory failure   -Intubated for mechanical ventilation support  -COVID-19 ruled out on admission        Leukocytosis  -Blood cultures without no growth     VALERIE (acute kidney injury):  -Seen by nephrology  -Treated with sodium bicarbonate drip       Elevated troponin:  -s/p chest compressions/ACLS  -consulted  cardiology     Hypocalcemia  -replaced     Macrocytic anemia:  -Hemoglobin/hematocrit stable   -No evidence of bleeding     Mixed hyperlipidemia:  -On atorvastatin at home     HTN (hypertension):  -On HCTZ, hydralazine, ramipril and diltiazem at home     CAD s/p stent (coronary artery disease)  -On Plavix, atorvastatin, aspirin at home     Gastroesophageal reflux disease  -protonix               Recommendation for Outpatient Providers:             Reasons For Change In Medications and Indications for New Medications:        Day of Discharge     HPI:     The patient is a 85 y.o. male  with history of hypertension and CAD s/p stent that initially presented to [place] ED after cardiac arrest.  Apparently the patient was found down by his family and was found with ventricular fibrillation by EMS and ACLS was initiated.  He received defibrillation and additional defibrillation in the ED for V. fib/V. tach.  He received 3 rounds of epinephrine, 2 amps of  sodium bicarb and amiodarone bolus and was started on levophed drip before being transferred to [facility] ICU.  Hypothermic protocol was started in the ICU because the patient did not recover neurological function.  The patient has been seen by nephrology for VALERIE, Neurology and cardiology.  MRI of the brain was done on 10/9/2020 which is concerning for diffuse abnormal signal intensity in the cerebrum and cerebellum concerning for hypoxic-anoxic and injury.     The was made comfort care in the afternoon of 10/10/2020.     The hospital service is consulted for  medical management.    Vital Signs:   Temp:  [99 °F (37.2 °C)-99.2 °F (37.3 °C)] 99 °F (37.2 °C)  Heart Rate:  [] 102  Resp:  [34] 34  BP: (112)/(62) 112/62  Arterial Line BP: (119-160)/(42-52) 131/45  FiO2 (%):  [40 %] 40 %       Pertinent  and/or Most Recent Results     Results from last 7 days   Lab Units 10/10/20  0403 10/09/20  0548 10/08/20  2352 10/08/20  1828 10/08/20  1234 10/08/20  0541 10/08/20  0034   WBC 10*3/mm3 7.30 8.20 10.20 15.00* 15.60* 12.90* 23.00*   HEMOGLOBIN g/dL 9.6* 10.9* 11.2* 11.2* 10.9* 10.2* 10.9*   HEMATOCRIT % 28.1* 31.9* 33.4* 32.7* 32.6* 30.4* 32.6*   PLATELETS 10*3/mm3 142 205 212 216 292 239 311   SODIUM mmol/L 139 139 142 142 141 142 142   POTASSIUM mmol/L 4.3 4.3 3.5 3.5 4.2 3.7 2.8*   CHLORIDE mmol/L 101 106 106 107 108* 113* 108*   CO2 mmol/L 23.0 20.0* 21.0* 20.0* 17.0* 14.0* 13.0*   BUN  47* 32* 31* 29* 27* 25* 23   CREATININE mg/dL 3.46* 2.67* 2.53* 2.40* 2.31* 2.15* 1.98*   GLUCOSE mg/dL 103* 91 106* 120* 173* 187* 280*   CALCIUM mg/dL 6.9* 7.6* 7.9* 8.1* 8.0* 7.8* 8.4*     Results from last 7 days   Lab Units 10/10/20  0403 10/09/20  0548 10/08/20  2352 10/08/20  1828 10/08/20  1234 10/08/20  0541 10/08/20  0034 10/07/20  1931   BILIRUBIN mg/dL 0.5 0.4 0.4 0.4 0.5 0.4 0.7 0.8   ALK PHOS U/L 91 54 58 67 58 53 63 80   ALT (SGPT) U/L 41 53* 59* 63* 64* 59* 62* 66*   AST (SGOT) U/L 109* 125* 124* 138* 126* 119* 124* 110*   PROTIME Seconds 12.5*  --   --   --   --   --   --  12.4*   INR  1.14*  --   --   --   --   --   --  1.13*   APTT seconds 37.1* 28.4 28.1 28.2 27.2 26.6 24.3 21.9*     Results from last 7 days   Lab Units 10/10/20  0403   TRIGLYCERIDES mg/dL 117     Results from last 7 days   Lab Units 10/09/20  0548 10/08/20  2352 10/08/20  1828 10/08/20  1234  10/08/20  0034 10/07/20  1931 10/07/20  1732   TROPONIN T ng/mL  --   --  1.070* 1.310*  --  1.960* 2.010* 0.718*   PROCALCITONIN ng/mL  --   --   --   --   --   --   --  0.05   LACTATE mmol/L 2.1* 1.6 1.8  3.4*   < > 7.8* 5.7*  --     < > = values in this interval not displayed.       Brief Urine Lab Results     None          Microbiology Results Abnormal     Procedure Component Value - Date/Time    Blood Culture - Blood, Hand, Right [[id number]] Collected: 10/07/20 1931    Lab Status: Preliminary result Specimen: Blood from Hand, Right Updated: 10/10/20 2000     Blood Culture No growth at 3 days    Blood Culture - Blood, Hand, Right [[id number]] Collected: 10/07/20 1931    Lab Status: Preliminary result Specimen: Blood from Hand, Right Updated: 10/10/20 2000     Blood Culture No growth at 3 days    Respiratory Panel PCR w/COVID-19(SARS-CoV-2) [facility]/SINDY/[facility]SE[facility] In-House, NP Swab in UTM/VTM, 3-4 HR TAT - Swab, Nasopharynx [[id number]]  (Normal) Collected: 10/07/20 2015    Lab Status: Final result Specimen: Swab from Nasopharynx Updated: 10/07/20 2210     ADENOVIRUS, PCR Not Detected     Coronavirus 229E Not Detected     Coronavirus HKU1 Not Detected     Coronavirus NL63 Not Detected     Coronavirus OC43 Not Detected     COVID19 Not Detected     Human Metapneumovirus Not Detected     Human Rhinovirus/Enterovirus Not Detected     Influenza A PCR Not Detected     Influenza A H1 Not Detected     Influenza A H1 2009 PCR Not Detected     Influenza A H3 Not Detected     Influenza B PCR Not Detected     Parainfluenza Virus 1 Not Detected     Parainfluenza Virus 2 Not Detected     Parainfluenza Virus 3 Not Detected     Parainfluenza Virus 4 Not Detected     RSV, PCR Not Detected     Bordetella pertussis pcr Not Detected     Bordetella parapertussis PCR Not Detected     Chlamydophila pneumoniae PCR Not Detected     Mycoplasma pneumo by PCR Not Detected    Narrative:      Fact sheet for providers: https://docs.[employer]/wp-content/uploads/YON1245-0489-YT8.1-EUA-Provider-Fact-Sheet-3.pdf    Fact sheet for patients: https://docs.[employer]/wp-content/uploads/JHO4090-5331-AQ1.1-EUA-Patient-Fact-Sheet-1.pdf    MRSA Screen,  PCR (Inpatient) - Swab, Nares [[id number]]  (Normal) Collected: 10/07/20 2015    Lab Status: Final result Specimen: Swab from Nares Updated: 10/07/20 2154     MRSA PCR No MRSA Detected          Ct Head Without Contrast    Result Date: 10/7/2020  Impression: 1.No acute intracranial abnormality. 2.There is some cerebral atrophy. 3.Atherosclerotic changes are present. 4.There is an appearance to the paranasal sinuses which could relate to intubation.  At some point MR may be of benefit in further workup of this patient as thought clinically indicated.  Electronically Signed By-Eduardo Pratt On:10/7/2020 6:51 PM This report was finalized on [id number] by  Eduardo Pratt, .    Mri Brain Without Contrast    Result Date: 10/9/2020  Impression: Diffuse abnormal signal intensity changes in the cerebrum and cerebellum, as can be seen in diffuse hypoxic-anoxic injury.  No acute intracranial hemorrhage or focal infarct.  Electronically Signed By-Dr. Samantha Diggs MD On:10/9/2020 4:42 PM This report was finalized on [id number] by Dr. Samantha Diggs MD.    Xr Chest 1 View    Result Date: 10/8/2020  Impression: 1. Placement of an esophagogastric tube with the tip below the diaphragm. 2. Stable ET tube and left IJ central venous catheter. 3. Cardiomegaly. 4. Clear lungs.  Electronically Signed By-Tom Paiz On:10/8/2020 8:26 AM This report was finalized on [id number] by  Tom Paiz, .    Xr Chest 1 View    Result Date: 10/7/2020  Impression: 1.Cardiomegaly 2.No definite infiltrates. 3.New left IJ line is noted. 4.There is an ET tube present. The tip is just above the ting. It may be pulled back 2.5 cm for more optimal positioning.  Electronically Signed By-Eduardo Pratt On:10/7/2020 9:55 PM This report was finalized on [id number] by  Eduardo Pratt, .    Xr Chest 1 View    Result Date: 10/7/2020  Impression: 1.No definite acute pulmonary process. 2.Cardiomegaly 3.ET tube could be pulled back slightly for more optimal  positioning.  Electronically Signed ByJames Pratt On:10/7/2020 6:49 PM This report was finalized on [id number] by  Eduardo Pratt .    Xr Abdomen Kub    Result Date: 10/7/2020  Impression: 1.Nasogastric tube tip is within the stomach.  Electronically Signed By-Eduardo Pratt On:10/7/2020 9:55 PM This report was finalized on [id number] by  Eduardo Pratt, .                Results for orders placed during the hospital encounter of 10/07/20   Adult Transthoracic Echo Complete W/ Cont if Necessary Per Protocol    Narrative · Left ventricular ejection fraction appears to be 31 - 35%.  · The right ventricular cavity is mildly dilated.  · Mitral annular calcification is present.  · Mild mitral valve regurgitation is present.  · Mild tricuspid valve regurgitation is present.  · The following left ventricular wall segments are hypokinetic: mid   anterior, apical anterior, apical septal, apex hypokinetic and mid   anteroseptal.  · No pericardial effusion noted                  Test Results Pending at Discharge  Pending Labs     Order Current Status    Blood Culture - Blood, Hand, Right Preliminary result    Blood Culture - Blood, Hand, Right Preliminary result            Procedures Performed    10/08 1332 INSERT CENTRAL LINE AT BEDSIDE      Consults:   Consults     Date and Time Order Name Status Description    10/10/2020 1654 Inpatient Hospitalist Consult Completed     10/9/2020 1032 Inpatient Neurology Consult General Completed     10/7/2020 1831 Inpatient Cardiology Consult Completed             Discharge Details        Discharge Medications      ASK your doctor about these medications      Instructions Start Date   Adult Aspirin EC Low Strength 81 MG EC tablet  Generic drug: aspirin   81 mg, Oral, Daily      atorvastatin 40 MG tablet  Commonly known as: LIPITOR   40 mg, Oral, Daily      clopidogrel 75 MG tablet  Commonly known as: PLAVIX   75 mg, Oral, Daily      dilTIAZem  MG 24 hr capsule  Commonly known as:  CARDIZEM CD   120 mg, Oral, 2 Times Daily      hydrALAZINE 100 MG tablet  Commonly known as: APRESOLINE   TAKE 1 TABLET BY MOUTH EVERY 12 HOURS      hydroCHLOROthiazide 12.5 MG capsule  Commonly known as: MICROZIDE   TAKE 1 CAPSULE BY MOUTH EVERY DAY      HYDROcodone-acetaminophen  MG per tablet  Commonly known as: NORCO   1 tablet, Oral, Every 6 Hours PRN      omeprazole 40 MG capsule  Commonly known as: priLOSEC   40 mg, Oral, Daily      ramipril 10 MG capsule  Commonly known as: ALTACE   10 mg, Oral, 2 times daily             No Known Allergies      Discharge Disposition:          CODE STATUS:    Code Status and Medical Interventions:   Ordered at: 10/10/20 1406     Level Of Support Discussed With:    Next of Kin (If No Surrogate)     Code Status:    No CPR     Medical Interventions (Level of Support Prior to Arrest):    Comfort Measures               Electronically signed by Navi Vyas DO, 10/11/20, 7:55 AM EDT.

## 2020-10-12 LAB
BACTERIA SPEC AEROBE CULT: NORMAL
BACTERIA SPEC AEROBE CULT: NORMAL

## 2020-10-12 NOTE — PROGRESS NOTES
Case Management Discharge Note      Final Note:           Final Discharge Disposition Code: 41 -  in medical facility

## 2020-10-21 RX ORDER — DILTIAZEM HYDROCHLORIDE 120 MG/1
CAPSULE, COATED, EXTENDED RELEASE ORAL
Qty: 180 CAPSULE | Refills: 3 | OUTPATIENT
Start: 2020-10-21